# Patient Record
Sex: FEMALE | Race: WHITE | NOT HISPANIC OR LATINO | Employment: FULL TIME | URBAN - METROPOLITAN AREA
[De-identification: names, ages, dates, MRNs, and addresses within clinical notes are randomized per-mention and may not be internally consistent; named-entity substitution may affect disease eponyms.]

---

## 2017-03-06 ENCOUNTER — OFFICE VISIT (OUTPATIENT)
Dept: ENDOCRINOLOGY | Facility: CLINIC | Age: 42
End: 2017-03-06

## 2017-03-06 VITALS
DIASTOLIC BLOOD PRESSURE: 78 MMHG | WEIGHT: 172.9 LBS | HEIGHT: 71 IN | BODY MASS INDEX: 24.21 KG/M2 | HEART RATE: 98 BPM | SYSTOLIC BLOOD PRESSURE: 114 MMHG

## 2017-03-06 DIAGNOSIS — E06.3 HASHIMOTO'S THYROIDITIS: Primary | ICD-10-CM

## 2017-03-06 DIAGNOSIS — E04.1 THYROID NODULE: ICD-10-CM

## 2017-03-06 ASSESSMENT — PAIN SCALES - GENERAL: PAINLEVEL: NO PAIN (0)

## 2017-03-06 NOTE — MR AVS SNAPSHOT
After Visit Summary   3/6/2017    Marjorie Wolf    MRN: [id number]           Patient Information     Date Of Birth          1975        Visit Information        Provider Department      3/6/2017 3:00 PM Brissa Salmeron MD [facility] Endocrinology        Today's Diagnoses     Hashimoto's thyroiditis    -  1    Thyroid nodule           Follow-ups after your visit        Follow-up notes from your care team     Return in about 1 year (around 3/6/2018).      Future tests that were ordered for you today     Open Future Orders        Priority Expected Expires Ordered    Calcium Routine  3/6/2018 3/6/2017    Vitamin D Deficiency (D3 Only) Routine  3/6/2018 3/6/2017    Parathyroid Hormone Intact Routine  3/6/2018 3/6/2017    TSH with free T4 reflex Routine  3/6/2018 3/6/2017            Who to contact     Please call your clinic at [phone] to:    Ask questions about your health    Make or cancel appointments    Discuss your medicines    Learn about your test results    Speak to your doctor   If you have compliments or concerns about an experience at your clinic, or if you wish to file a complaint, please contact [facility] Patient Relations at [phone] or email us at [email or web address]         Additional Information About Your Visit        MyChart Information     [employer] gives you secure access to your electronic health record. If you see a primary care provider, you can also send messages to your care team and make appointments. If you have questions, please call your primary care clinic.  If you do not have a primary care provider, please call [phone] and they will assist you.      [employer] is an electronic gateway that provides easy, online access to your medical records. With [employer], you can request a clinic appointment, read your test results, renew a prescription or communicate with your care team.     To access your existing account,  "please contact your [facility] or call [phone] for assistance.        Care EveryWhere ID     This is your Care EveryWhere ID. This could be used by other organizations to access your [facility] medical records  [id number]        Your Vitals Were     Pulse Height BMI (Body Mass Index)             98 5' 10.5\" (1.791 m) 24.46 kg/m2          Blood Pressure from Last 3 Encounters:   03/06/17 114/78   10/04/16 116/76   09/12/16 122/76    Weight from Last 3 Encounters:   03/06/17 172 lb 14.4 oz (78.4 kg)   10/04/16 176 lb 6.4 oz (80 kg)   09/12/16 179 lb (81.2 kg)               Primary Care Provider Office Phone # Fax #    Jeanette Rodrigues [phone]       [facility] [address] [address]        Thank you!     Thank you for choosing [facility]  for your care. Our goal is always to provide you with excellent care. Hearing back from our patients is one way we can continue to improve our services. Please take a few minutes to complete the written survey that you may receive in the mail after your visit with us. Thank you!             Your Updated Medication List - Protect others around you: Learn how to safely use, store and throw away your medicines at www.disposemymeds.org.          This list is accurate as of: 3/6/17  9:03 PM.  Always use your most recent med list.                   Brand Name Dispense Instructions for use    amitriptyline 25 MG tablet    ELAVIL    120 tablet    TAKE 4 TABLETS BY MOUTH NIGHTLY AS NEEDED FOR SLEEP       doxycycline 100 MG capsule    VIBRAMYCIN    28 capsule    Take 1 capsule (100 mg) by mouth 2 times daily       fluticasone 50 MCG/ACT spray    FLONASE    3 Bottle    SHAKE LIQUID AND USE 1 TO 2 SPRAYS IN EACH NOSTRIL DAILY       LACTAID PO          loratadine 10 MG tablet    CLARITIN    30 tablet    Take 1 tablet (10 mg) by mouth daily       pirbuterol 200 MCG/INH Inhaler    MAXAIR AUTOHALER "    1 Inhaler    Inhale 2 puffs into the lungs. Failed albuterol,PRN in emergencies       SYNTHROID 25 MCG tablet   Generic drug:  levothyroxine     90 tablet    Take 1 tablet (25 mcg) by mouth daily       terbinafine 250 MG tablet    lamISIL    90 tablet    Take 1 tablet (250 mg) by mouth daily       UNKNOWN MED DOSAGE      digestive advantage for lactose intolorance - 1 tab daily

## 2017-03-06 NOTE — LETTER
3/6/2017       RE: Marjorie Wolf  [address] [facility] [address]     Dear Colleague,    Thank you for referring your patient, Marjorie Wolf, to the [facility] ENDOCRINOLOGY at [facility]. Please see a copy of my visit note below.    Endocrinology and Diabetes Clinic  Date of Service: March 6, 2017    Subjective:   Marjorie Wolf is a 42 year old woman here for follow up of Hashimoto's thyroiditis.She saw Dr. Stephen in the past, and this is her first visit with me.     Hashimoto's thyroiditis was diagnosed in 2014, when she presented with goiter and asymptomatic hypothyroidism. She was started on levothyroxine, which she currently takes at a dose of 25 mcg daily. She feels well today, and aside from mild constipation she denies symptoms of hypo or hyperthyroidism.     A thyroid nodule or parathyroid gland was seen on ultrasound in 2014 and has been followed by ultrasound since that time. The nodule was 6x4x8 mm on most recent ultrasound in 11/2016 and is hypoechoic without concerning features. It has not grown since first imaged in 2014.     Past Medical History:  1. Mild asthma.   2. Insomnia.   3. Dyslipidemia.   4. Hashimoto's thyroiditis.     Medications:   Albuterol prn (uses rarely)  Amitriptyline 100 mg prn sleep  Levothyroxine 25 mcg daily  Lactaid  Probiotic   Miralax prn constipation    Allergies:   Allergies   Allergen Reactions     Erythromycin Rash     Penicillins Rash     Sulfa Drugs Rash       Social History:  She is in a stable relationship and lives with her partner in [place]. 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 She does not use tobacco and drinks a few glasses of wine a week.     Social History   Substance Use Topics     Smoking status: Never Smoker     Smokeless tobacco: Never Used     Alcohol use 0.0 oz/week     0 Standard drinks or equivalent per week      Comment: Occas.       Family History:  Hemachromatosis -  "father  Hypothyroidism and type 2 DM - mother  Cardiovascular disease, hypertension, and hyperlipidemia run in the family.   There is no family history of thyroid malignancy.     Family History   Problem Relation Age of Onset     C.A.D. Maternal Grandfather      Artherosclerosis- cause of death, unsure if previous MI     DIABETES Maternal Grandfather      Type 2     Eye Disorder Maternal Grandfather      Glaucoma     C.A.D. Father      MI at age 54     GASTROINTESTINAL DISEASE Father      Ulcer H-Pylori?     Lipids Father      DIABETES Mother      Type 2     Arthritis Mother      Depression Mother      anxiety/panic attacks as well     Obesity Mother      Thyroid Disease Mother      dx ~35yrs     Hypertension Mother      Arthritis Maternal Grandmother      Arthritis Sister      Depression Sister      Neurologic Disorder Sister      Migraines     Lipids Sister      CEREBROVASCULAR DISEASE No family hx of      Breast Cancer No family hx of      Cancer - colorectal No family hx of        Review of Systems:  GENERAL: She has intentionally lost a few pounds by altering her diet.   SKIN: Negative  HENT: Negative   EYE: Negative  HEART: Negative  RESPIRATORY: Negative   GI: Mild constipation this week, for which she is using prn Miralax.   : Negative  MSK: Negative  BLOOD/LYMPH: Negative  NEUROLOGIC: Negative   PSYCH: Negative    Physical Examination:  Blood pressure 114/78, pulse 98, height 5' 10.5\" (1.791 m), weight 172 lb 14.4 oz (78.4 kg), not currently breastfeeding.  Body mass index is 24.46 kg/(m^2).    Wt Readings from Last 4 Encounters:   03/06/17 172 lb 14.4 oz (78.4 kg)   10/04/16 176 lb 6.4 oz (80 kg)   09/12/16 179 lb (81.2 kg)   07/29/16 178 lb 11.2 oz (81.1 kg)     General: Well appearing woman in no distress.   Eyes: EOMI. Sclerae and conjunctivae are clear.    HENT: Mild thyromegaly, no discrete nodules palpable.    Lymphatic: No cervical or supraclavicular lymphadenopathy.  Cardiovascular: RRR, with " normal S1+S2 and no murmurs.   Respiratory: Lungs are clear to auscultation.   Skin: Normal temperature.    Extremities: No peripheral edema.   Neurologic: No tremor with hands outstretched.Normoactive patellar reflex.     Labs and Studies:   ENDO THYROID LABS-[facility] Latest Ref Rng & Units 9/2/2016   TSH 0.40 - 4.00 mU/L 1.91       Assessment:  1. Chronic lymphocytic thyroiditis (Hashimotos thyroiditis).   2. Small posterior thyroid or parathyroid nodule that has been stable on serial imaging.   3. Dyslipidemia.     Plan:   Continue levothyroxine at current dose. Check TSH to confirm euthyroidism. Refill levothyroxine after result returns. She is on a very low dose of levothyroxine and it is possible she does not need this medication - if she would like to trial stopping she will let me know. However, for now she does not mind taking the pill, and her lifetime risk of developing overt hypothyroidism requiring thyroid hormone replacement is high. Check calcium, vitamin D, and PTH with blood draw. Continue to follow the thyroid/parathyroid nodule with serial ultrasound, with next ultrasound in one year. Follow up in clinic at that time.     Brissa Salmeron MD   Diabetes and Endocrinology   [phone]

## 2017-03-06 NOTE — PROGRESS NOTES
Endocrinology and Diabetes Clinic  Date of Service: March 6, 2017    Subjective:   Marjorie Wolf is a 42 year old woman here for follow up of Hashimoto's thyroiditis.She saw Dr. Stephen in the past, and this is her first visit with me.     Hashimoto's thyroiditis was diagnosed in 2014, when she presented with goiter and asymptomatic hypothyroidism. She was started on levothyroxine, which she currently takes at a dose of 25 mcg daily. She feels well today, and aside from mild constipation she denies symptoms of hypo or hyperthyroidism.     A thyroid nodule or parathyroid gland was seen on ultrasound in 2014 and has been followed by ultrasound since that time. The nodule was 6x4x8 mm on most recent ultrasound in 11/2016 and is hypoechoic without concerning features. It has not grown since first imaged in 2014.     Past Medical History:  1. Mild asthma.   2. Insomnia.   3. Dyslipidemia.   4. Hashimoto's thyroiditis.     Medications:   Albuterol prn (uses rarely)  Amitriptyline 100 mg prn sleep  Levothyroxine 25 mcg daily  Lactaid  Probiotic   Miralax prn constipation    Allergies:   Allergies   Allergen Reactions     Erythromycin Rash     Penicillins Rash     Sulfa Drugs Rash       Social History:  She is in a stable relationship and lives with her partner in [place]. 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 She does not use tobacco and drinks a few glasses of wine a week.     Social History   Substance Use Topics     Smoking status: Never Smoker     Smokeless tobacco: Never Used     Alcohol use 0.0 oz/week     0 Standard drinks or equivalent per week      Comment: Occas.       Family History:  Hemachromatosis - father  Hypothyroidism and type 2 DM - mother  Cardiovascular disease, hypertension, and hyperlipidemia run in the family.   There is no family history of thyroid malignancy.     Family History   Problem Relation Age of Onset     C.A.D. Maternal Grandfather      Artherosclerosis- cause of death, unsure  "if previous MI     DIABETES Maternal Grandfather      Type 2     Eye Disorder Maternal Grandfather      Glaucoma     C.A.D. Father      MI at age 54     GASTROINTESTINAL DISEASE Father      Ulcer H-Pylori?     Lipids Father      DIABETES Mother      Type 2     Arthritis Mother      Depression Mother      anxiety/panic attacks as well     Obesity Mother      Thyroid Disease Mother      dx ~35yrs     Hypertension Mother      Arthritis Maternal Grandmother      Arthritis Sister      Depression Sister      Neurologic Disorder Sister      Migraines     Lipids Sister      CEREBROVASCULAR DISEASE No family hx of      Breast Cancer No family hx of      Cancer - colorectal No family hx of        Review of Systems:  GENERAL: She has intentionally lost a few pounds by altering her diet.   SKIN: Negative  HENT: Negative   EYE: Negative  HEART: Negative  RESPIRATORY: Negative   GI: Mild constipation this week, for which she is using prn Miralax.   : Negative  MSK: Negative  BLOOD/LYMPH: Negative  NEUROLOGIC: Negative   PSYCH: Negative    Physical Examination:  Blood pressure 114/78, pulse 98, height 5' 10.5\" (1.791 m), weight 172 lb 14.4 oz (78.4 kg), not currently breastfeeding.  Body mass index is 24.46 kg/(m^2).    Wt Readings from Last 4 Encounters:   03/06/17 172 lb 14.4 oz (78.4 kg)   10/04/16 176 lb 6.4 oz (80 kg)   09/12/16 179 lb (81.2 kg)   07/29/16 178 lb 11.2 oz (81.1 kg)     General: Well appearing woman in no distress.   Eyes: EOMI. Sclerae and conjunctivae are clear.    HENT: Mild thyromegaly, no discrete nodules palpable.    Lymphatic: No cervical or supraclavicular lymphadenopathy.  Cardiovascular: RRR, with normal S1+S2 and no murmurs.   Respiratory: Lungs are clear to auscultation.   Skin: Normal temperature.    Extremities: No peripheral edema.   Neurologic: No tremor with hands outstretched.Normoactive patellar reflex.     Labs and Studies:   ENDO THYROID LABS-[facility] Latest Ref Rng & Units 9/2/2016   TSH 0.40 " - 4.00 mU/L 1.91       Assessment:  1. Chronic lymphocytic thyroiditis (Hashimotos thyroiditis).   2. Small posterior thyroid or parathyroid nodule that has been stable on serial imaging.   3. Dyslipidemia.     Plan:   Continue levothyroxine at current dose. Check TSH to confirm euthyroidism. Refill levothyroxine after result returns. She is on a very low dose of levothyroxine and it is possible she does not need this medication - if she would like to trial stopping she will let me know. However, for now she does not mind taking the pill, and her lifetime risk of developing overt hypothyroidism requiring thyroid hormone replacement is high. Check calcium, vitamin D, and PTH with blood draw. Continue to follow the thyroid/parathyroid nodule with serial ultrasound, with next ultrasound in one year. Follow up in clinic at that time.     Brissa Salmeron MD   Diabetes and Endocrinology   [phone]

## 2017-04-07 DIAGNOSIS — E06.3 HASHIMOTO'S THYROIDITIS: ICD-10-CM

## 2017-04-07 DIAGNOSIS — E04.1 THYROID NODULE: ICD-10-CM

## 2017-04-07 LAB
CALCIUM SERPL-MCNC: 9.6 MG/DL (ref 8.5–10.1)
PTH-INTACT SERPL-MCNC: 41 PG/ML (ref 12–72)
TSH SERPL DL<=0.005 MIU/L-ACNC: 1.24 MU/L (ref 0.4–4)

## 2017-04-07 PROCEDURE — 83970 ASSAY OF PARATHORMONE: CPT | Performed by: FAMILY MEDICINE

## 2017-04-07 PROCEDURE — 84443 ASSAY THYROID STIM HORMONE: CPT | Performed by: FAMILY MEDICINE

## 2017-04-07 PROCEDURE — 82310 ASSAY OF CALCIUM: CPT | Performed by: FAMILY MEDICINE

## 2017-04-07 PROCEDURE — 82306 VITAMIN D 25 HYDROXY: CPT | Performed by: FAMILY MEDICINE

## 2017-04-07 PROCEDURE — 36415 COLL VENOUS BLD VENIPUNCTURE: CPT | Performed by: FAMILY MEDICINE

## 2017-04-09 LAB — DEPRECATED CALCIDIOL+CALCIFEROL SERPL-MC: 7 UG/L (ref 20–75)

## 2017-05-31 DIAGNOSIS — G47.00 INSOMNIA: ICD-10-CM

## 2017-06-01 DIAGNOSIS — G47.00 INSOMNIA: ICD-10-CM

## 2017-06-01 NOTE — TELEPHONE ENCOUNTER
Amitriptyline     Last Written Prescription Date: 6-1-17  Last Fill Quantity: 120, # refills: 0  Last Office Visit with [facility], [facility] or  Health prescribing provider: 10-4-16   Next 5 appointments (look out 90 days)     Jun 07, 2017  9:00 AM CDT   MyChart Physical Adult with Jeanette Rodrigues DO   [facility]    [address]  [facility] [id number] [phone]                   BP Readings from Last 3 Encounters:   03/06/17 114/78   10/04/16 116/76   09/12/16 122/76     Last PHQ-9 score on record=   PHQ-9 SCORE 3/22/2011   Total Score 3

## 2017-06-01 NOTE — TELEPHONE ENCOUNTER
Pharmacy requesting 90 day supply  Denied  90 day supply not appropriate; patient has upcoming appointment  Katarzyna CENTENO RN

## 2017-06-01 NOTE — TELEPHONE ENCOUNTER
Medication is being filled for 1 time refill only due to:  upcoming appt   Katarzyna CENTENO RN    Pending Prescriptions:                       Disp   Refills    amitriptyline (ELAVIL) 25 MG tablet [Pharm*120 ta*0        Sig: TAKE 4 TABLETS BY MOUTH NIGHTLY AS NEEDED FOR SLEEP       Last Written Prescription Date: 10/6/2016  Last Fill Quantity: 120, # refills: 5  Last Office Visit with [facility], [facility] or [facility] prescribing provider: 10/4/2016   Next 5 appointments (look out 90 days)     Jun 07, 2017  9:00 AM CDT   MyChart Physical Adult with Jeanette Rodrigues DO   [facility]    [address]  [facility] [address] [phone]                   BP Readings from Last 3 Encounters:   03/06/17 114/78   10/04/16 116/76   09/12/16 122/76     Last PHQ-9 score on record=   PHQ-9 SCORE 3/22/2011   Total Score 3

## 2017-06-07 ENCOUNTER — OFFICE VISIT (OUTPATIENT)
Dept: FAMILY MEDICINE | Facility: CLINIC | Age: 42
End: 2017-06-07
Payer: COMMERCIAL

## 2017-06-07 ENCOUNTER — TELEPHONE (OUTPATIENT)
Dept: FAMILY MEDICINE | Facility: CLINIC | Age: 42
End: 2017-06-07

## 2017-06-07 VITALS
HEART RATE: 68 BPM | SYSTOLIC BLOOD PRESSURE: 102 MMHG | DIASTOLIC BLOOD PRESSURE: 68 MMHG | TEMPERATURE: 98.5 F | OXYGEN SATURATION: 100 % | BODY MASS INDEX: 23.24 KG/M2 | HEIGHT: 71 IN | WEIGHT: 166 LBS

## 2017-06-07 DIAGNOSIS — G47.00 INSOMNIA, UNSPECIFIED TYPE: ICD-10-CM

## 2017-06-07 DIAGNOSIS — J45.20 INTERMITTENT ASTHMA, UNCOMPLICATED: ICD-10-CM

## 2017-06-07 DIAGNOSIS — J45.20 INTERMITTENT ASTHMA, UNCOMPLICATED: Primary | ICD-10-CM

## 2017-06-07 DIAGNOSIS — E55.9 VITAMIN D DEFICIENCY: ICD-10-CM

## 2017-06-07 DIAGNOSIS — Z00.00 ENCOUNTER FOR ROUTINE ADULT HEALTH EXAMINATION WITHOUT ABNORMAL FINDINGS: Primary | ICD-10-CM

## 2017-06-07 LAB
ALBUMIN SERPL-MCNC: 4 G/DL (ref 3.4–5)
ALP SERPL-CCNC: 74 U/L (ref 40–150)
ALT SERPL W P-5'-P-CCNC: 17 U/L (ref 0–50)
ANION GAP SERPL CALCULATED.3IONS-SCNC: 6 MMOL/L (ref 3–14)
AST SERPL W P-5'-P-CCNC: 15 U/L (ref 0–45)
BILIRUB SERPL-MCNC: 0.5 MG/DL (ref 0.2–1.3)
BUN SERPL-MCNC: 10 MG/DL (ref 7–30)
CALCIUM SERPL-MCNC: 8.8 MG/DL (ref 8.5–10.1)
CHLORIDE SERPL-SCNC: 109 MMOL/L (ref 94–109)
CHOLEST SERPL-MCNC: 180 MG/DL
CO2 SERPL-SCNC: 26 MMOL/L (ref 20–32)
CREAT SERPL-MCNC: 0.67 MG/DL (ref 0.52–1.04)
DEPRECATED CALCIDIOL+CALCIFEROL SERPL-MC: 28 UG/L (ref 20–75)
GFR SERPL CREATININE-BSD FRML MDRD: NORMAL ML/MIN/1.7M2
GLUCOSE SERPL-MCNC: 88 MG/DL (ref 70–99)
HDLC SERPL-MCNC: 53 MG/DL
LDLC SERPL CALC-MCNC: 111 MG/DL
NONHDLC SERPL-MCNC: 127 MG/DL
POTASSIUM SERPL-SCNC: 4.1 MMOL/L (ref 3.4–5.3)
PROT SERPL-MCNC: 7.7 G/DL (ref 6.8–8.8)
SODIUM SERPL-SCNC: 141 MMOL/L (ref 133–144)
TRIGL SERPL-MCNC: 78 MG/DL

## 2017-06-07 PROCEDURE — 80053 COMPREHEN METABOLIC PANEL: CPT | Performed by: FAMILY MEDICINE

## 2017-06-07 PROCEDURE — 99396 PREV VISIT EST AGE 40-64: CPT | Performed by: FAMILY MEDICINE

## 2017-06-07 PROCEDURE — 82306 VITAMIN D 25 HYDROXY: CPT | Performed by: FAMILY MEDICINE

## 2017-06-07 PROCEDURE — 80061 LIPID PANEL: CPT | Performed by: FAMILY MEDICINE

## 2017-06-07 PROCEDURE — 36415 COLL VENOUS BLD VENIPUNCTURE: CPT | Performed by: FAMILY MEDICINE

## 2017-06-07 RX ORDER — LEVALBUTEROL TARTRATE 45 UG/1
AEROSOL, METERED ORAL
Qty: 1 INHALER | Refills: 5 | Status: SHIPPED | OUTPATIENT
Start: 2017-06-07 | End: 2017-06-07

## 2017-06-07 NOTE — PROGRESS NOTES
SUBJECTIVE:     CC: Marjorie Wolf is an 42 year old woman who presents for preventive health visit.     Physical   Annual:     Getting at least 3 servings of Calcium per day::  Yes    Bi-annual eye exam::  Yes    Dental care twice a year::  Yes    Sleep apnea or symptoms of sleep apnea::  None    Diet::  Vegetarian/vegan    Frequency of exercise::  4-5 days/week    Duration of exercise::  45-60 minutes    Taking medications regularly::  Yes    Medication side effects::  Other    Additional concerns today::  YES    -constipation  -arthritis in both hands  -check vit D status    Today's PHQ-2 Score:   PHQ-2 ( 1999 [employer]) 6/4/2017   Q1: Little interest or pleasure in doing things Not at all   Q2: Feeling down, depressed or hopeless Not at all   PHQ-2 Score 0       Abuse: Current or Past(Physical, Sexual or Emotional)- No  Do you feel safe in your environment - Yes    Social History   Substance Use Topics     Smoking status: Never Smoker     Smokeless tobacco: Never Used     Alcohol use 0.0 oz/week     0 Standard drinks or equivalent per week      Comment: Occas.     The patient does not drink >3 drinks per day nor >7 drinks per week.    Recent Labs   Lab Test  06/03/16   0926  05/29/15   0952  05/16/14   1017   CHOL  193  180  195   HDL  48*  60  47*   LDL  124*  101  123   TRIG  105  95  125   CHOLHDLRATIO   --   3.0  4.1   NHDL  145*   --    --        Reviewed orders with patient.  Reviewed health maintenance and updated orders accordingly - Yes    Mammo Decision Support:  Patient under age 50, mutual decision reflected in health maintenance.      Pertinent mammograms are reviewed under the imaging tab.  History of abnormal Pap smear: NO - age 30-65 PAP every 5 years with negative HPV co-testing recommended    Reviewed and updated as needed this visit by clinical staff  Tobacco  Allergies  Meds  Problems         Reviewed and updated as needed this visit by Provider  Allergies  Meds  Problems       "      ROS:  C: NEGATIVE for fever, chills, change in weight  I: NEGATIVE for worrisome rashes, moles or lesions  E: NEGATIVE for vision changes or irritation  ENT: NEGATIVE for ear, mouth and throat problems  R: NEGATIVE for significant cough or SOB  B: NEGATIVE for masses, tenderness or discharge  CV: NEGATIVE for chest pain, palpitations or peripheral edema  GI: NEGATIVE for nausea, abdominal pain, heartburn, or change in bowel habits  : NEGATIVE for unusual urinary or vaginal symptoms. Periods are regular.  M: NEGATIVE for significant arthralgias or myalgia  N: NEGATIVE for weakness, dizziness or paresthesias  P: NEGATIVE for changes in mood or affect    Problem list, Medication list, Allergies, and Medical/Social/Surgical histories reviewed in EPIC and updated as appropriate.  OBJECTIVE:     /68 (BP Location: Left arm, Patient Position: Chair, Cuff Size: Adult Regular)  Pulse 68  Temp 98.5  F (36.9  C) (Oral)  Ht 5' 10.5\" (1.791 m)  Wt 166 lb (75.3 kg)  SpO2 100%  Breastfeeding? No  BMI 23.48 kg/m2  EXAM:  GENERAL: healthy, alert and no distress  EYES: Eyes grossly normal to inspection, PERRL and conjunctivae and sclerae normal  HENT: ear canals and TM's normal, nose and mouth without ulcers or lesions  NECK: no adenopathy, no asymmetry, masses, or scars and thyroid normal to palpation  RESP: lungs clear to auscultation - no rales, rhonchi or wheezes  BREAST: normal without masses, tenderness or nipple discharge and no palpable axillary masses or adenopathy  CV: regular rate and rhythm, normal S1 S2, no S3 or S4, no murmur, click or rub, no peripheral edema and peripheral pulses strong  ABDOMEN: soft, nontender, no hepatosplenomegaly, no masses and bowel sounds normal  MS: no gross musculoskeletal defects noted, no edema  SKIN: no suspicious lesions or rashes  NEURO: Normal strength and tone, mentation intact and speech normal  PSYCH: mentation appears normal, affect " "normal/bright    ASSESSMENT/PLAN:     1. Encounter for routine adult health examination without abnormal findings  Routine screening  - Comprehensive metabolic panel (BMP + Alb, Alk Phos, ALT, AST, Total. Bili, TP)  - Lipid Profile with reflex to direct LDL    2. Vitamin D deficiency  Very def - has been taking 2000 IU daily for at least 6 weeks   Will recheck today to make sure increasing  - Vitamin D Deficiency    3. Insomnia, unspecified type  Refilled med  - amitriptyline (ELAVIL) 25 MG tablet; TAKE 4 TABLETS BY MOUTH NIGHTLY AS NEEDED FOR SLEEP  Dispense: 360 tablet; Refill: 3  - Comprehensive metabolic panel (BMP + Alb, Alk Phos, ALT, AST, Total. Bili, TP)    4. Intermittent asthma, uncomplicated  Refilled for very infrequent use  - pirbuterol (MAXAIR AUTOHALER) 200 MCG/INH Inhaler; Inhale 2 puffs into the lungs. Failed albuterol,PRN in emergencies  Dispense: 1 Inhaler; Refill: 12    COUNSELING:  Reviewed preventive health counseling, as reflected in patient instructions         reports that she has never smoked. She has never used smokeless tobacco.    Estimated body mass index is 23.48 kg/(m^2) as calculated from the following:    Height as of this encounter: 5' 10.5\" (1.791 m).    Weight as of this encounter: 166 lb (75.3 kg).       Counseling Resources:  ATP IV Guidelines  Pooled Cohorts Equation Calculator  Breast Cancer Risk Calculator  FRAX Risk Assessment  ICSI Preventive Guidelines  Dietary Guidelines for Americans, 2010  USDA's MyPlate  ASA Prophylaxis  Lung CA Screening    Jeanette Rodrigues, DO  [facility]  "

## 2017-06-07 NOTE — TELEPHONE ENCOUNTER
Reason for Call:  Medication or medication refill:    Do you use a [facility]?  Name of the pharmacy and phone number for the current request:    [id number] - [place], [place] - [address] AT [facility].    Name of the medication requested: pirbuterol (MAXAIR AUTOHALER) 200 MCG/INH Inhaler  This is not available.  Pharmacist suggested a new script for Xopenex?  Please send a new prescription to [facility]        Can we leave a detailed message on this number? YES    Phone number:[phone]    Best Time: any time    Call taken on 6/7/2017 at 9:43 AM by Lenora Miguel  .

## 2017-06-07 NOTE — MR AVS SNAPSHOT
"              After Visit Summary   6/7/2017    Marjorie Wolf    MRN: [id number]           Patient Information     Date Of Birth          1975        Visit Information        Provider Department      6/7/2017 9:00 AM Jeanette Rodrigues DO [facility]        Today's Diagnoses     Encounter for routine adult health examination without abnormal findings    -  1    Vitamin D deficiency        Insomnia, unspecified type        Intermittent asthma, uncomplicated           Follow-ups after your visit        Who to contact     If you have questions or need follow up information about today's clinic visit or your schedule please contact [facility] directly at [phone].  Normal or non-critical lab and imaging results will be communicated to you by HeyBubblehart, letter or phone within 4 business days after the clinic has received the results. If you do not hear from us within 7 days, please contact the clinic through HeyBubblehart or phone. If you have a critical or abnormal lab result, we will notify you by phone as soon as possible.  Submit refill requests through Moviepilot or call your pharmacy and they will forward the refill request to us. Please allow 3 business days for your refill to be completed.          Additional Information About Your Visit        MyChart Information     Moviepilot gives you secure access to your electronic health record. If you see a primary care provider, you can also send messages to your care team and make appointments. If you have questions, please call your primary care clinic.  If you do not have a primary care provider, please call [phone] and they will assist you.        Care EveryWhere ID     This is your Care EveryWhere ID. This could be used by other organizations to access your [place] medical records  [id number]        Your Vitals Were     Pulse Temperature Height Pulse Oximetry Breastfeeding? BMI (Body Mass Index)    68 98.5  F (36.9  C) (Oral) 5' 10.5\" " (1.791 m) 100% No 23.48 kg/m2       Blood Pressure from Last 3 Encounters:   06/07/17 102/68   03/06/17 114/78   10/04/16 116/76    Weight from Last 3 Encounters:   06/07/17 166 lb (75.3 kg)   03/06/17 172 lb 14.4 oz (78.4 kg)   10/04/16 176 lb 6.4 oz (80 kg)              We Performed the Following     Comprehensive metabolic panel (BMP + Alb, Alk Phos, ALT, AST, Total. Bili, TP)     Lipid Profile with reflex to direct LDL     Vitamin D Deficiency          Today's Medication Changes          These changes are accurate as of: 6/7/17  9:42 AM.  If you have any questions, ask your nurse or doctor.               These medicines have changed or have updated prescriptions.        Dose/Directions    amitriptyline 25 MG tablet   Commonly known as:  ELAVIL   This may have changed:  See the new instructions.   Used for:  Insomnia, unspecified type   Changed by:  Jeanette Rodrigues DO        TAKE 4 TABLETS BY MOUTH NIGHTLY AS NEEDED FOR SLEEP   Quantity:  360 tablet   Refills:  3         Stop taking these medicines if you haven't already. Please contact your care team if you have questions.     doxycycline 100 MG capsule   Commonly known as:  VIBRAMYCIN   Stopped by:  Jeanette Rodrigues DO           fluticasone 50 MCG/ACT spray   Commonly known as:  FLONASE   Stopped by:  Jeanette Rodrigues DO           loratadine 10 MG tablet   Commonly known as:  CLARITIN   Stopped by:  Jeanette Rodrigues DO           terbinafine 250 MG tablet   Commonly known as:  lamISIL   Stopped by:  Jeanette Rodrigues DO                Where to get your medicines      These medications were sent to [id number] - [place] - [address] AT [facility] [address] [facility], [place] [address]     Phone:  [phone]     amitriptyline 25 MG tablet    pirbuterol 200 MCG/INH Inhaler                Primary Care Provider Office Phone # Fax #    Jeanette Rodrigues [phone]       [facility]  [facility] [address] [facility]  [address]  [facility] [address]        Thank you!     Thank you for choosing [facility]  for your care. Our goal is always to provide you with excellent care. Hearing back from our patients is one way we can continue to improve our services. Please take a few minutes to complete the written survey that you may receive in the mail after your visit with us. Thank you!             Your Updated Medication List - Protect others around you: Learn how to safely use, store and throw away your medicines at www.disposemymeds.org.          This list is accurate as of: 6/7/17  9:42 AM.  Always use your most recent med list.                   Brand Name Dispense Instructions for use    amitriptyline 25 MG tablet    ELAVIL    360 tablet    TAKE 4 TABLETS BY MOUTH NIGHTLY AS NEEDED FOR SLEEP       LACTAID PO          pirbuterol 200 MCG/INH Inhaler    MAXAIR AUTOHALER    1 Inhaler    Inhale 2 puffs into the lungs. Failed albuterol,PRN in emergencies       SYNTHROID 25 MCG tablet   Generic drug:  levothyroxine     90 tablet    Take 1 tablet (25 mcg) by mouth daily       UNKNOWN MED DOSAGE      digestive advantage for lactose intolorance - 1 tab daily

## 2017-06-08 RX ORDER — LEVALBUTEROL TARTRATE 45 UG/1
AEROSOL, METERED ORAL
Qty: 75 G | Refills: 1 | Status: SHIPPED | OUTPATIENT
Start: 2017-06-08 | End: 2019-06-26

## 2017-06-08 NOTE — TELEPHONE ENCOUNTER
Levoalbuterol       Last Written Prescription Date: 6/7/2017  Last Fill Quantity: 1, # refills: 5    Last Office Visit with [facility], [facility] or [facility] prescribing provider:  6/7/2017  Future Office Visit:   No future appts scheduled.    Date of Last Asthma Action Plan Letter:   Asthma Action Plan Q1 Year    Topic Date Due     Asthma Action Plan - yearly  06/03/2017      Asthma Control Test:   ACT Total Scores 6/3/2016   ACT TOTAL SCORE -   ASTHMA ER VISITS -   ASTHMA HOSPITALIZATIONS -   ACT TOTAL SCORE (Goal Greater than or Equal to 20) 25   In the past 12 months, how many times did you visit the emergency room for your asthma without being admitted to the hospital? 0   In the past 12 months, how many times were you hospitalized overnight because of your asthma? 0       Date of Last Spirometry Test:   No results found for this or any previous visit.      Clarissa Meade MA  [facility]

## 2017-06-09 NOTE — PROGRESS NOTES
Dear Marjorie,   Your test results are all back -   Vitamin D is improving - keep taking the current dose.  Your liver, kidney and glucose are all normal.  The cholesterol looks better!  Let us know if you have any questions.  -Jeanette Rodrigues, DO

## 2017-06-13 ENCOUNTER — TELEPHONE (OUTPATIENT)
Dept: FAMILY MEDICINE | Facility: CLINIC | Age: 42
End: 2017-06-13

## 2017-06-13 NOTE — TELEPHONE ENCOUNTER
Usually pt's go on xopenex because they do not tolerate ventolin/albuterol.  Do not know off hand for this pt (PCP PN).  Would like assistance from team/triage/MTM to help work through this.  Will forward to Charlotte Cruz as well as we've been discussing PA processing.  Thanks-   CW

## 2017-06-13 NOTE — TELEPHONE ENCOUNTER
Xopenex inhaler not covered by insurance.  Ventolin (preferred alt) covered, $25 copay for qty 2 for 30 days.  Pt has $0 deductible.  [facility] ph# [phone]  ID# [id number]  [facility] ph# [phone]

## 2017-06-15 NOTE — TELEPHONE ENCOUNTER
I'm not sure what else we can do here besides a PA. The covered alternatives are clearly listed by Silvia - if she can't use the covered meds, a PA is required.     Shonna Navas, PharmD, SIVA, BCACP  MTM Pharmacist, [facility]

## 2017-06-15 NOTE — TELEPHONE ENCOUNTER
Thanks Shonna- team please call pt and see if she had se's on albuterol, and if so, start PA with that info.  Thanks- CW

## 2017-06-16 NOTE — TELEPHONE ENCOUNTER
Pt is calling back. And has tried Albuterol  And it made her heart race. And the other was  A pill that you crush and you inhale it and she  Does not remember the name of it. It was so long  Ago and she thinks it may not have been as  Effective.     Home Phone [phone]   Mobile [phone]

## 2017-06-20 NOTE — TELEPHONE ENCOUNTER
LM on VM.  Requesting specific info on which inhaler was tried.  What did it look like?  Was it Ventolin or Proair?

## 2017-06-23 NOTE — TELEPHONE ENCOUNTER
PA approved.  Effective date: 06/22/2017  PA reference #: unknown    Letter sent to abstracting    Melanie Butterfield CMA

## 2017-06-28 DIAGNOSIS — G47.00 INSOMNIA: ICD-10-CM

## 2017-06-28 NOTE — TELEPHONE ENCOUNTER
Denied  Refill request too early; Rx sent 6/7/2017 for 1 year supply  GRADY Bonilla     Last Written Prescription Date: 6/7/2017  Last Fill Quantity: 360, # refills: 3  Last Office Visit with G, P or [facility] prescribing provider:         BP Readings from Last 3 Encounters:   06/07/17 102/68   03/06/17 114/78   10/04/16 116/76     Last PHQ-9 score on record=   PHQ-9 SCORE 3/22/2011   Total Score 3

## 2017-07-07 DIAGNOSIS — E06.3 HYPOTHYROIDISM DUE TO HASHIMOTO'S THYROIDITIS: ICD-10-CM

## 2017-07-10 RX ORDER — LEVOTHYROXINE SODIUM 25 MCG
25 TABLET ORAL DAILY
Qty: 90 TABLET | Refills: 3 | Status: SHIPPED | OUTPATIENT
Start: 2017-07-10 | End: 2018-06-20

## 2017-07-10 NOTE — TELEPHONE ENCOUNTER
SYNTHROID 25 MCG        Last Written Prescription Date:  10/7/16  Last Fill Quantity: 90,   # refills: 2  Last Office Visit : 3/6/17  Future Office visit:  NONE

## 2017-09-07 ENCOUNTER — TELEPHONE (OUTPATIENT)
Dept: ENDOCRINOLOGY | Facility: CLINIC | Age: 42
End: 2017-09-07

## 2017-09-07 DIAGNOSIS — E04.1 THYROID NODULE: Primary | ICD-10-CM

## 2017-10-06 ENCOUNTER — DOCUMENTATION ONLY (OUTPATIENT)
Dept: OTHER | Facility: CLINIC | Age: 42
End: 2017-10-06

## 2017-10-06 PROBLEM — Z71.89 ACP (ADVANCE CARE PLANNING): Chronic | Status: ACTIVE | Noted: 2017-10-06

## 2017-11-09 ENCOUNTER — ALLIED HEALTH/NURSE VISIT (OUTPATIENT)
Dept: NURSING | Facility: CLINIC | Age: 42
End: 2017-11-09
Payer: COMMERCIAL

## 2017-11-09 VITALS — TEMPERATURE: 97.5 F

## 2017-11-09 DIAGNOSIS — Z23 NEED FOR PROPHYLACTIC VACCINATION AND INOCULATION AGAINST INFLUENZA: Primary | ICD-10-CM

## 2017-11-09 PROCEDURE — 90471 IMMUNIZATION ADMIN: CPT

## 2017-11-09 PROCEDURE — 90686 IIV4 VACC NO PRSV 0.5 ML IM: CPT

## 2017-11-09 PROCEDURE — 99207 ZZC NO CHARGE NURSE ONLY: CPT

## 2017-11-09 NOTE — PROGRESS NOTES

## 2017-11-09 NOTE — MR AVS SNAPSHOT
After Visit Summary   11/9/2017    Marjorie Wolf    MRN: [id number]           Patient Information     Date Of Birth          1975        Visit Information        Provider Department      11/9/2017 2:50 PM [facility]U [facility]IC [facility]        Today's Diagnoses     Need for prophylactic vaccination and inoculation against influenza    -  1       Follow-ups after your visit        Your next 10 appointments already scheduled     Nov 09, 2017  2:50 PM CST   Flu Shot only with CP FLU CLINIC   [facility] ([facility])    [address] [facility] [id number] [phone]            Mar 01, 2018  9:45 AM CST   US THYROID with UCUS2   [facility] ([facility])    [address] [place] [id number] [phone]           Please bring a list of your medicines (including vitamins, minerals and over-the-counter drugs). Also, tell your doctor about any allergies you may have. Wear comfortable clothes and leave your valuables at home.  You do not need to do anything special to prepare for your exam.  Please call the Imaging Department at your exam site with any questions.            Mar 05, 2018  2:00 PM CST   (Arrive by 1:45 PM)   RETURN ENDOCRINE with Brissa Salmeron MD   [facility] ([facility])    [address] [place] [address] [phone]              Who to contact     If you have questions or need follow up information about today's clinic visit or your schedule please contact [facility] directly at [phone].  Normal or non-critical lab and imaging results will be communicated to you by MyChart, letter or phone within 4 business days after the clinic has received the results. If you do not hear from us within 7 days, please contact the clinic through  Yodo1hart or phone. If you have a critical or abnormal lab result, we will notify you by phone as soon as possible.  Submit refill requests through [employer] or call your pharmacy and they will forward the refill request to us. Please allow 3 business days for your refill to be completed.          Additional Information About Your Visit        Yodo1hart Information     [employer] gives you secure access to your electronic health record. If you see a primary care provider, you can also send messages to your care team and make appointments. If you have questions, please call your primary care clinic.  If you do not have a primary care provider, please call [phone] and they will assist you.        Care EveryWhere ID     This is your Care EveryWhere ID. This could be used by other organizations to access your [place] medical records  [id number]        Your Vitals Were     Temperature                   97.5  F (36.4  C) (Oral)            Blood Pressure from Last 3 Encounters:   06/07/17 102/68   03/06/17 114/78   10/04/16 116/76    Weight from Last 3 Encounters:   06/07/17 166 lb (75.3 kg)   03/06/17 172 lb 14.4 oz (78.4 kg)   10/04/16 176 lb 6.4 oz (80 kg)              We Performed the Following     FLU VAC, SPLIT VIRUS IM > 3 YO (QUADRIVALENT) [49229]     Vaccine Administration, Initial [46143]        Primary Care Provider Office Phone # Fax #    Jeanette ISABEL DO Ravi [phone] [address] [address]        Equal Access to Services     RADHA CORBIN : Hadii aad ku hadasho Soomaali, waaxda luqadaha, qaybta kaalmada adeegyada, rolo barahona. So [facility] [phone].    ATENCIÓN: Si habla safiaañol, tiene a cerda disposición servicios gratuitos de asistencia lingüística. Llame al [phone].    We comply with applicable federal civil rights laws and [place] laws. We do not discriminate on the basis of race, color, national origin, age, disability, sex, sexual  orientation, or gender identity.            Thank you!     Thank you for choosing [facility]  for your care. Our goal is always to provide you with excellent care. Hearing back from our patients is one way we can continue to improve our services. Please take a few minutes to complete the written survey that you may receive in the mail after your visit with us. Thank you!             Your Updated Medication List - Protect others around you: Learn how to safely use, store and throw away your medicines at www.disposemymeds.org.          This list is accurate as of: 11/9/17  2:49 PM.  Always use your most recent med list.                   Brand Name Dispense Instructions for use Diagnosis    amitriptyline 25 MG tablet    ELAVIL    360 tablet    TAKE 4 TABLETS BY MOUTH NIGHTLY AS NEEDED FOR SLEEP    Insomnia, unspecified type       LACTAID PO           levalbuterol 45 MCG/ACT Inhaler    XOPENEX HFA    75 g    INHALE 2 PUFFS INTO THE LUNGS AS NEEDED IN EMERGENCIES.    Intermittent asthma, uncomplicated       pirbuterol 200 MCG/INH Inhaler    MAXAIR AUTOHALER    1 Inhaler    Inhale 2 puffs into the lungs. Failed albuterol,PRN in emergencies    Intermittent asthma, uncomplicated       SYNTHROID 25 MCG tablet   Generic drug:  levothyroxine     90 tablet    Take 1 tablet (25 mcg) by mouth daily    Hypothyroidism due to Hashimoto's thyroiditis       UNKNOWN MED DOSAGE      digestive advantage for lactose intolorance - 1 tab daily

## 2017-11-09 NOTE — NURSING NOTE
Prior to injection verified patient identity using patient's name and date of birth.  Pascale Rai MA    Per orders of Flu Clinic, injection of Flu given by Pascale Rai CMA. Patient instructed to remain in clinic for 15 minutes afterwards, and to report any adverse reaction to me immediately.  Pascale Rai MA

## 2018-02-28 DIAGNOSIS — E06.3 HYPOTHYROIDISM DUE TO HASHIMOTO'S THYROIDITIS: ICD-10-CM

## 2018-02-28 DIAGNOSIS — E55.9 VITAMIN D DEFICIENCY: ICD-10-CM

## 2018-02-28 LAB
CALCIUM SERPL-MCNC: 9.5 MG/DL (ref 8.5–10.1)
CREAT SERPL-MCNC: 0.73 MG/DL (ref 0.52–1.04)
GFR SERPL CREATININE-BSD FRML MDRD: 87 ML/MIN/1.7M2
PTH-INTACT SERPL-MCNC: 44 PG/ML (ref 18–80)
T4 FREE SERPL-MCNC: 0.73 NG/DL (ref 0.76–1.46)
TSH SERPL DL<=0.005 MIU/L-ACNC: 1.6 MU/L (ref 0.4–4)

## 2018-02-28 PROCEDURE — 83970 ASSAY OF PARATHORMONE: CPT | Performed by: INTERNAL MEDICINE

## 2018-02-28 PROCEDURE — 82565 ASSAY OF CREATININE: CPT | Performed by: INTERNAL MEDICINE

## 2018-02-28 PROCEDURE — 36415 COLL VENOUS BLD VENIPUNCTURE: CPT | Performed by: INTERNAL MEDICINE

## 2018-02-28 PROCEDURE — 82306 VITAMIN D 25 HYDROXY: CPT | Performed by: INTERNAL MEDICINE

## 2018-02-28 PROCEDURE — 84439 ASSAY OF FREE THYROXINE: CPT | Performed by: INTERNAL MEDICINE

## 2018-02-28 PROCEDURE — 84443 ASSAY THYROID STIM HORMONE: CPT | Performed by: INTERNAL MEDICINE

## 2018-02-28 PROCEDURE — 82310 ASSAY OF CALCIUM: CPT | Performed by: INTERNAL MEDICINE

## 2018-03-01 ENCOUNTER — RADIANT APPOINTMENT (OUTPATIENT)
Dept: ULTRASOUND IMAGING | Facility: CLINIC | Age: 43
End: 2018-03-01
Attending: INTERNAL MEDICINE
Payer: COMMERCIAL

## 2018-03-01 DIAGNOSIS — E04.1 THYROID NODULE: ICD-10-CM

## 2018-03-02 LAB
DEPRECATED CALCIDIOL+CALCIFEROL SERPL-MC: <37 UG/L (ref 20–75)
VITAMIN D2 SERPL-MCNC: <5 UG/L
VITAMIN D3 SERPL-MCNC: 32 UG/L

## 2018-03-09 ENCOUNTER — OFFICE VISIT (OUTPATIENT)
Dept: FAMILY MEDICINE | Facility: CLINIC | Age: 43
End: 2018-03-09
Payer: COMMERCIAL

## 2018-03-09 VITALS
HEIGHT: 71 IN | SYSTOLIC BLOOD PRESSURE: 114 MMHG | HEART RATE: 88 BPM | WEIGHT: 174.3 LBS | BODY MASS INDEX: 24.4 KG/M2 | DIASTOLIC BLOOD PRESSURE: 72 MMHG | TEMPERATURE: 98.3 F | OXYGEN SATURATION: 98 %

## 2018-03-09 DIAGNOSIS — R07.0 THROAT PAIN: Primary | ICD-10-CM

## 2018-03-09 DIAGNOSIS — J01.90 ACUTE SINUSITIS WITH SYMPTOMS > 10 DAYS: ICD-10-CM

## 2018-03-09 LAB
DEPRECATED S PYO AG THROAT QL EIA: NORMAL
FLUAV+FLUBV AG SPEC QL: NEGATIVE
FLUAV+FLUBV AG SPEC QL: NEGATIVE
SPECIMEN SOURCE: NORMAL
SPECIMEN SOURCE: NORMAL

## 2018-03-09 PROCEDURE — 87880 STREP A ASSAY W/OPTIC: CPT | Performed by: FAMILY MEDICINE

## 2018-03-09 PROCEDURE — 99213 OFFICE O/P EST LOW 20 MIN: CPT | Performed by: FAMILY MEDICINE

## 2018-03-09 PROCEDURE — 87081 CULTURE SCREEN ONLY: CPT | Performed by: FAMILY MEDICINE

## 2018-03-09 PROCEDURE — 87804 INFLUENZA ASSAY W/OPTIC: CPT | Mod: 59 | Performed by: FAMILY MEDICINE

## 2018-03-09 RX ORDER — FLUTICASONE PROPIONATE 50 MCG
1-2 SPRAY, SUSPENSION (ML) NASAL DAILY
Qty: 1 BOTTLE | Refills: 11 | Status: SHIPPED | OUTPATIENT
Start: 2018-03-09 | End: 2020-02-07

## 2018-03-09 RX ORDER — DOXYCYCLINE HYCLATE 100 MG
100 TABLET ORAL 2 TIMES DAILY
Qty: 28 TABLET | Refills: 0 | Status: SHIPPED | OUTPATIENT
Start: 2018-03-09 | End: 2018-06-20

## 2018-03-09 NOTE — PROGRESS NOTES
Dear Marjorie,   Your test results are all back -   -All of your labs are normal.  Let us know if you have any questions.  -Jeanette Rodrigues, DO

## 2018-03-09 NOTE — PROGRESS NOTES
SUBJECTIVE:   Marjorie Wolf is a 43 year old female who presents to clinic today for the following health issues:      RESPIRATORY SYMPTOMS      Duration: 10 days    Description  sore throat, facial pain/pressure, cough, headache, fatigue/malaise and conjunctival irritation    Severity: moderate    Accompanying signs and symptoms: None    History (predisposing factors):  none    Precipitating or alleviating factors: None    Therapies tried and outcome:  OTC NSAID- only minimal relief.      -------------------------------------    Problem list and histories reviewed & adjusted, as indicated.  Additional history: as documented    Patient Active Problem List   Diagnosis     Allergic rhinitis due to pollen     Family history of other cardiovascular diseases     Low back pain     Hypertriglyceridemia     CARDIOVASCULAR SCREENING; LDL GOAL LESS THAN 160     Intermittent asthma     Insomnia     SVT (supraventricular tachycardia) (H)     Family history of hemochromatosis     Hashimoto's thyroiditis     Thyroid enlargement     Thyroid nodule     ACP (advance care planning)     Past Surgical History:   Procedure Laterality Date     C APPENDECTOMY  4/1989     C NONSPECIFIC PROCEDURE  6/1990    Nasal surgery for deviated septum     C NONSPECIFIC PROCEDURE  11/1990    Nasal Surgery for deviated septum     SURGICAL HISTORY OF -   2000    wisdom teeth extraction       Social History   Substance Use Topics     Smoking status: Never Smoker     Smokeless tobacco: Never Used     Alcohol use 0.0 oz/week     0 Standard drinks or equivalent per week      Comment: Occas.     Family History   Problem Relation Age of Onset     DIABETES Mother      Type 2     Arthritis Mother      Depression Mother      anxiety/panic attacks as well     Obesity Mother      Thyroid Disease Mother      dx ~35yrs     Hypertension Mother      C.A.D. Father      MI at age 54     GASTROINTESTINAL DISEASE Father      Ulcer H-Pylori?     Lipids Father       "Arthritis Maternal Grandmother      C.A.D. Maternal Grandfather      Artherosclerosis- cause of death, unsure if previous MI     DIABETES Maternal Grandfather      Type 2     Eye Disorder Maternal Grandfather      Glaucoma     Arthritis Sister      Depression Sister      Neurologic Disorder Sister      Migraines     Lipids Sister      CEREBROVASCULAR DISEASE No family hx of      Breast Cancer No family hx of      Cancer - colorectal No family hx of            Reviewed and updated as needed this visit by clinical staff  Tobacco  Allergies  Meds       Reviewed and updated as needed this visit by Provider         ROS:  Constitutional, HEENT, cardiovascular, pulmonary, gi and gu systems are negative, except as otherwise noted.    OBJECTIVE:     /72  Pulse 88  Temp 98.3  F (36.8  C) (Tympanic)  Ht 5' 10.5\" (1.791 m)  Wt 174 lb 4.8 oz (79.1 kg)  SpO2 98%  Breastfeeding? No  BMI 24.66 kg/m2  Body mass index is 24.66 kg/(m^2).  GENERAL: alert and no distress  EYES: Eyes grossly normal to inspection, PERRL and conjunctivae and sclerae normal  HENT: normal cephalic/atraumatic, ear canals and TM's normal, nose and mouth without ulcers or lesions, oropharynx clear, oral mucous membranes moist and sinuses: maxillary tenderness on bilateral  NECK: no adenopathy, no asymmetry, masses, or scars and thyroid normal to palpation  RESP: lungs clear to auscultation - no rales, rhonchi or wheezes and initially rale cleared with cough  BREAST: normal without masses, tenderness or nipple discharge and no palpable axillary masses or adenopathy  CV: regular rate and rhythm, normal S1 S2, no S3 or S4, no murmur, click or rub, no peripheral edema and peripheral pulses strong  MS: no gross musculoskeletal defects noted, no edema  SKIN: no suspicious lesions or rashes  NEURO: Normal strength and tone, mentation intact and speech normal  PSYCH: mentation appears normal, affect normal/bright    Diagnostic Test Results:  Results for " orders placed or performed in visit on 03/09/18 (from the past 24 hour(s))   Strep, Rapid Screen   Result Value Ref Range    Specimen Description Throat     Rapid Strep A Screen       NEGATIVE: No Group A streptococcal antigen detected by immunoassay, await culture report.       ASSESSMENT/PLAN:     1. Upper respiratory infection     - Influenza A/B antigen    2. Throat pain     - Strep, Rapid Screen  - Beta strep group A culture    3. Acute sinusitis with symptoms > 10 days  Will treat with doxy BID for 10 days  Pt will call or RTC if symptoms worsen or do not improve.   - doxycycline (VIBRA-TABS) 100 MG tablet; Take 1 tablet (100 mg) by mouth 2 times daily  Dispense: 28 tablet; Refill: 0  - fluticasone (FLONASE) 50 MCG/ACT spray; Spray 1-2 sprays into both nostrils daily  Dispense: 1 Bottle; Refill: 11        Jeanette Rodrigues DO  [facility]

## 2018-03-09 NOTE — MR AVS SNAPSHOT
After Visit Summary   3/9/2018    Marjorie Wolf    MRN: [id number]           Patient Information     Date Of Birth          1975        Visit Information        Provider Department      3/9/2018 9:15 AM Jeanette Rodrigues DO [facility]        Today's Diagnoses     Throat pain    -  1    Acute sinusitis with symptoms > 10 days           Follow-ups after your visit        Your next 10 appointments already scheduled     Aug 13, 2018  2:00 PM CDT   (Arrive by 1:45 PM)   RETURN ENDOCRINE with Brissa Salmeron MD   [facility] Endocrinology ([facility] [place])    [address] [place] [address] [phone]              Who to contact     If you have questions or need follow up information about today's clinic visit or your schedule please contact [facility] directly at [phone].  Normal or non-critical lab and imaging results will be communicated to you by MyChart, letter or phone within 4 business days after the clinic has received the results. If you do not hear from us within 7 days, please contact the clinic through MyChart or phone. If you have a critical or abnormal lab result, we will notify you by phone as soon as possible.  Submit refill requests through [employer] or call your pharmacy and they will forward the refill request to us. Please allow 3 business days for your refill to be completed.          Additional Information About Your Visit        MyChart Information     [employer] gives you secure access to your electronic health record. If you see a primary care provider, you can also send messages to your care team and make appointments. If you have questions, please call your primary care clinic.  If you do not have a primary care provider, please call [phone] and they will assist you.        Care EveryWhere ID     This is your Care EveryWhere ID. This could be used by other organizations to access your [place]  "medical records  [id number]        Your Vitals Were     Pulse Temperature Height Pulse Oximetry Breastfeeding? BMI (Body Mass Index)    88 98.3  F (36.8  C) (Tympanic) 5' 10.5\" (1.791 m) 98% No 24.66 kg/m2       Blood Pressure from Last 3 Encounters:   03/09/18 114/72   06/07/17 102/68   03/06/17 114/78    Weight from Last 3 Encounters:   03/09/18 174 lb 4.8 oz (79.1 kg)   06/07/17 166 lb (75.3 kg)   03/06/17 172 lb 14.4 oz (78.4 kg)              We Performed the Following     Beta strep group A culture     Influenza A/B antigen     Strep, Rapid Screen          Today's Medication Changes          These changes are accurate as of 3/9/18  9:43 AM.  If you have any questions, ask your nurse or doctor.               Start taking these medicines.        Dose/Directions    doxycycline 100 MG tablet   Commonly known as:  VIBRA-TABS   Used for:  Acute sinusitis with symptoms > 10 days   Started by:  Jeanette Rodrigues DO        Dose:  100 mg   Take 1 tablet (100 mg) by mouth 2 times daily   Quantity:  28 tablet   Refills:  0       fluticasone 50 MCG/ACT spray   Commonly known as:  FLONASE   Used for:  Acute sinusitis with symptoms > 10 days   Started by:  Jeanette Rodrigues DO        Dose:  1-2 spray   Spray 1-2 sprays into both nostrils daily   Quantity:  1 Bottle   Refills:  11            Where to get your medicines      [id number] - [place], [place] - [address] AT [facility] [address]  [address], [place] [address]     Phone:  [phone]     doxycycline 100 MG tablet    fluticasone 50 MCG/ACT spray                Primary Care Provider Office Phone # Fax #    Jeanette Rodrigues -[phone] [address] [address]        Equal Access to Services     JAY CORBIN AH: Jered Kimball, yudelka blackwell, rolo reedersh la'aan ah. So [facility] " [phone].    ATENCIÓN: Si jailyn mejia, tiene a cerda disposición servicios gratuitos de asistencia lingüística. Gio tavares [phone].    We comply with applicable federal civil rights laws and [place] laws. We do not discriminate on the basis of race, color, national origin, age, disability, sex, sexual orientation, or gender identity.            Thank you!     Thank you for choosing [facility]  for your care. Our goal is always to provide you with excellent care. Hearing back from our patients is one way we can continue to improve our services. Please take a few minutes to complete the written survey that you may receive in the mail after your visit with us. Thank you!             Your Updated Medication List - Protect others around you: Learn how to safely use, store and throw away your medicines at www.disposemymeds.org.          This list is accurate as of 3/9/18  9:43 AM.  Always use your most recent med list.                   Brand Name Dispense Instructions for use Diagnosis    amitriptyline 25 MG tablet    ELAVIL    360 tablet    TAKE 4 TABLETS BY MOUTH NIGHTLY AS NEEDED FOR SLEEP    Insomnia, unspecified type       doxycycline 100 MG tablet    VIBRA-TABS    28 tablet    Take 1 tablet (100 mg) by mouth 2 times daily    Acute sinusitis with symptoms > 10 days       fluticasone 50 MCG/ACT spray    FLONASE    1 Bottle    Spray 1-2 sprays into both nostrils daily    Acute sinusitis with symptoms > 10 days       LACTAID PO           levalbuterol 45 MCG/ACT Inhaler    XOPENEX HFA    75 g    INHALE 2 PUFFS INTO THE LUNGS AS NEEDED IN EMERGENCIES.    Intermittent asthma, uncomplicated       pirbuterol 200 MCG/INH Inhaler    MAXAIR AUTOHALER    1 Inhaler    Inhale 2 puffs into the lungs. Failed albuterol,PRN in emergencies    Intermittent asthma, uncomplicated       SYNTHROID 25 MCG tablet   Generic drug:  levothyroxine     90 tablet    Take 1 tablet (25 mcg) by mouth daily    Hypothyroidism due to  Hashimoto's thyroiditis       UNKNOWN MED DOSAGE      digestive advantage for lactose intolorance - 1 tab daily

## 2018-03-10 LAB
BACTERIA SPEC CULT: NORMAL
SPECIMEN SOURCE: NORMAL

## 2018-04-04 ENCOUNTER — MYC MEDICAL ADVICE (OUTPATIENT)
Dept: FAMILY MEDICINE | Facility: CLINIC | Age: 43
End: 2018-04-04

## 2018-06-04 ENCOUNTER — OFFICE VISIT (OUTPATIENT)
Dept: ENDOCRINOLOGY | Facility: CLINIC | Age: 43
End: 2018-06-04
Payer: COMMERCIAL

## 2018-06-04 VITALS
DIASTOLIC BLOOD PRESSURE: 74 MMHG | HEART RATE: 89 BPM | BODY MASS INDEX: 24.19 KG/M2 | SYSTOLIC BLOOD PRESSURE: 111 MMHG | WEIGHT: 172.8 LBS | HEIGHT: 71 IN

## 2018-06-04 DIAGNOSIS — E06.3 HASHIMOTO'S THYROIDITIS: Primary | ICD-10-CM

## 2018-06-04 DIAGNOSIS — E04.1 THYROID NODULE: ICD-10-CM

## 2018-06-04 NOTE — PROGRESS NOTES
Endocrinology and Diabetes Clinic Follow-up   Date of Service: June 4, 2018    Subjective:  Marjorie Wolf is a 42 year old woman here for follow up of Hashimoto's thyroiditis. Hashimoto's thyroiditis was diagnosed in 2014, when she presented with goiter and asymptomatic hypothyroidism.      She was taking 25 mcg of levothyroxine daily until January, when her insurance stopped covering Synthroid.  At that time, she stopped levothyroxine as she had always been asymptomatic and was on a very low dose. She feels well today and aside from mild, chronic constipation denies any symptoms of hypo or hyperthyroidism.  She denies any change in symptoms after stopping the medication.    A thyroid nodule or parathyroid gland was seen on US in 2014 and has been followed by ultrasound since that time. On most recent US on 3/1/18 there is a right sided posterior 0.5x 0.4x 0.6 cm nodule which is hypoechoic without concerning features, which was stable in size compared with previous imaging. On various reports there is reference to a right posterior nodule which may be an exophytic nodule or parathyroid gland; I believe these reports are all referring to the same lesion. Thyroid gland has diffusely heterogenous echotexture, consistent with Hashimoto's thyroiditis.     Past Medical History:  1) Mild asthma  2) Insomnia  3) Dyslipidemia  4) Hashimoto's thyroiditis    Medications:  Albuterol prn (uses rarely)  Amitriptyline 100mg prn sleep  Lactaid  Probiotic  Miralax daily    Allergies:  Allergies   Allergen Reactions     Erythromycin Rash     Penicillins Rash     Sulfa Drugs Rash     Social History:  She remains in a stable relationship and lives with her partner in [place].  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 She has never used tobacco and drinks a few glasses of wine a week.    Family History:  Father: hemachromatosis  Mother: hypothyroidism, type 2 DM  CV disease, HTN and  "hyperlipidemia run in family  No family history of thyroid malignancy.    Family History   Problem Relation Age of Onset     DIABETES Mother      Type 2     Arthritis Mother      Depression Mother      anxiety/panic attacks as well     Obesity Mother      Thyroid Disease Mother      dx ~35yrs     Hypertension Mother      C.A.D. Father      MI at age 54     GASTROINTESTINAL DISEASE Father      Ulcer H-Pylori?     Lipids Father      Arthritis Maternal Grandmother      C.A.D. Maternal Grandfather      Artherosclerosis- cause of death, unsure if previous MI     DIABETES Maternal Grandfather      Type 2     Eye Disorder Maternal Grandfather      Glaucoma     Arthritis Sister      Depression Sister      Neurologic Disorder Sister      Migraines     Lipids Sister      CEREBROVASCULAR DISEASE No family hx of      Breast Cancer No family hx of      Cancer - colorectal No family hx of      Review of Systems:  General: no weight changes  Skin: negative  HENT: negative  Eye: some dry eyes treated with lubricating eye drops  Heart: negative  Respiratory: negative  GI: mild constipation treated with miralax  : negative  MSK: negative  Neurologic: negative  Psych: negative    Physical Exam:  /74 (BP Location: Right arm)  Pulse 89  Ht 1.791 m (5' 10.5\")  Wt 78.4 kg (172 lb 12.8 oz)  BMI 24.44 kg/m2      Wt Readings from Last 4 Encounters:   06/04/18 78.4 kg (172 lb 12.8 oz)   03/09/18 79.1 kg (174 lb 4.8 oz)   06/07/17 75.3 kg (166 lb)   03/06/17 78.4 kg (172 lb 14.4 oz)     General: well appearing, no acute distress  Eyes: extraocular movements intact, sclera and conjunctiva are clear  Neck: Mild thyromegaly on palpation, no discrete nodules palpable  Lymphatic: no cervical or supraclavicular lymphadenopathy  CV: RRR, normal S1+S2, no murmurs  Respiratory: lungs clear to auscultation bilaterally  Skin: normal temperature and turgor  Extremities: no peripheral edema  Neurologic: No tremor with hands outstretched, " patellar reflexes normoactive    Labs:  2/28/18  TSH: 1.6  T4: 0.73  PTH: 44  Cr: 0.73  Ca: 9.5  25 OH vitamin D3: 32    Assessment:  1. Chronic lymphocytic thyroiditis (Hashimoto's thyroiditis). It appears that she still has adequate thyroid function and does not yet require levothyroxine.  2. Small posterior thyroid or parathyroid nodule that has been stable on serial imaging.  3. History of low vitamin D level (7). The accuracy of this result is questionalble, as concurrent PTH was normal and the low vitamin D level corrected within 2 months without a high dose replacement (she reports taking only 1000 units daily). Recent level was 32, and so we recommended that she continue taking a maintenance dose of vitamin D 1000 units daily, particularly in the winter.     Plan:  Will recheck TSH with lab draw planned at her annual physical later this month. At that point she will have been off of levothyroxine treatment for 5 months. If normal, she may continue to follow TSH annually with her PCP, or sooner if she develops symptoms of hypothyroidism.  Given stability over several years of imaging, annual thyroid ultrasounds are no longer needed. It would be reasonable to repeat an ultrasound in another 3-5 years, sooner if symptoms.         Marjorie would like to follow TSH with her PCP and will follow up in endocrine clinic on a prn basis. She will contact us if she has any questions or concerns.      This note was scribed by Linda Parks, MS4. Patient was seen and examined with attending physician, Dr. Salmeron.      Physician Attestation   I, Brissa Salmeron, was present with the medical student who participated in the service and in the documentation of the note.  I have verified the history and personally performed the physical exam and medical decision making.  I agree with the assessment and plan of care as documented in the note, which I have reviewed and edited.     Brissa Salmeron MD  Endocrinology and Diabetes    [phone]

## 2018-06-04 NOTE — LETTER
6/4/2018       RE: Marjorie Wolf  [address] [facility] [address]     Dear Colleague,    Thank you for referring your patient, Marjorie Wolf, to the [facility] ENDOCRINOLOGY at [facility]. Please see a copy of my visit note below.    Endocrinology and Diabetes Clinic Follow-up   Date of Service: June 4, 2018    Subjective:  Marjorie Wolf is a 42 year old woman here for follow up of Hashimoto's thyroiditis. Hashimoto's thyroiditis was diagnosed in 2014, when she presented with goiter and asymptomatic hypothyroidism.      She was taking 25 mcg of levothyroxine daily until January, when her insurance stopped covering Synthroid.  At that time, she stopped levothyroxine as she had always been asymptomatic and was on a very low dose. She feels well today and aside from mild, chronic constipation denies any symptoms of hypo or hyperthyroidism.  She denies any change in symptoms after stopping the medication.    A thyroid nodule or parathyroid gland was seen on US in 2014 and has been followed by ultrasound since that time. On most recent US on 3/1/18 there is a right sided posterior 0.5x 0.4x 0.6 cm nodule which is hypoechoic without concerning features, which was stable in size compared with previous imaging. On various reports there is reference to a right posterior nodule which may be an exophytic nodule or parathyroid gland; I believe these reports are all referring to the same lesion. Thyroid gland has diffusely heterogenous echotexture, consistent with Hashimoto's thyroiditis.     Past Medical History:  1) Mild asthma  2) Insomnia  3) Dyslipidemia  4) Hashimoto's thyroiditis    Medications:  Albuterol prn (uses rarely)  Amitriptyline 100mg prn sleep  Lactaid  Probiotic  Miralax daily    Allergies:  Allergies   Allergen Reactions     Erythromycin Rash     Penicillins Rash     Sulfa Drugs Rash     Social History:  She remains in a stable relationship and lives with  "her partner in [place].  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 She has never used tobacco and drinks a few glasses of wine a week.    Family History:  Father: hemachromatosis  Mother: hypothyroidism, type 2 DM  CV disease, HTN and hyperlipidemia run in family  No family history of thyroid malignancy.    Family History   Problem Relation Age of Onset     DIABETES Mother      Type 2     Arthritis Mother      Depression Mother      anxiety/panic attacks as well     Obesity Mother      Thyroid Disease Mother      dx ~35yrs     Hypertension Mother      C.A.D. Father      MI at age 54     GASTROINTESTINAL DISEASE Father      Ulcer H-Pylori?     Lipids Father      Arthritis Maternal Grandmother      C.A.D. Maternal Grandfather      Artherosclerosis- cause of death, unsure if previous MI     DIABETES Maternal Grandfather      Type 2     Eye Disorder Maternal Grandfather      Glaucoma     Arthritis Sister      Depression Sister      Neurologic Disorder Sister      Migraines     Lipids Sister      CEREBROVASCULAR DISEASE No family hx of      Breast Cancer No family hx of      Cancer - colorectal No family hx of      Review of Systems:  General: no weight changes  Skin: negative  HENT: negative  Eye: some dry eyes treated with lubricating eye drops  Heart: negative  Respiratory: negative  GI: mild constipation treated with miralax  : negative  MSK: negative  Neurologic: negative  Psych: negative    Physical Exam:  /74 (BP Location: Right arm)  Pulse 89  Ht 1.791 m (5' 10.5\")  Wt 78.4 kg (172 lb 12.8 oz)  BMI 24.44 kg/m2      Wt Readings from Last 4 Encounters:   06/04/18 78.4 kg (172 lb 12.8 oz)   03/09/18 79.1 kg (174 lb 4.8 oz)   06/07/17 75.3 kg (166 lb)   03/06/17 78.4 kg (172 lb 14.4 oz)     General: well appearing, no acute distress  Eyes: extraocular movements intact, sclera and conjunctiva are clear  Neck: Mild thyromegaly on palpation, no discrete nodules " palpable  Lymphatic: no cervical or supraclavicular lymphadenopathy  CV: RRR, normal S1+S2, no murmurs  Respiratory: lungs clear to auscultation bilaterally  Skin: normal temperature and turgor  Extremities: no peripheral edema  Neurologic: No tremor with hands outstretched, patellar reflexes normoactive    Labs:  2/28/18  TSH: 1.6  T4: 0.73  PTH: 44  Cr: 0.73  Ca: 9.5  25 OH vitamin D3: 32    Assessment:  1. Chronic lymphocytic thyroiditis (Hashimoto's thyroiditis). It appears that she still has adequate thyroid function and does not yet require levothyroxine.  2. Small posterior thyroid or parathyroid nodule that has been stable on serial imaging.  3. History of low vitamin D level (7). The accuracy of this result is questionalble, as concurrent PTH was normal and the low vitamin D level corrected within 2 months without a high dose replacement (she reports taking only 1000 units daily). Recent level was 32, and so we recommended that she continue taking a maintenance dose of vitamin D 1000 units daily, particularly in the winter.     Plan:  Will recheck TSH with lab draw planned at her annual physical later this month. At that point she will have been off of levothyroxine treatment for 5 months. If normal, she may continue to follow TSH annually with her PCP, or sooner if she develops symptoms of hypothyroidism.  Given stability over several years of imaging, annual thyroid ultrasounds are no longer needed. It would be reasonable to repeat an ultrasound in another 3-5 years, sooner if symptoms.         Marjorie would like to follow TSH with her PCP and will follow up in endocrine clinic on a prn basis. She will contact us if she has any questions or concerns.      This note was scribed by Linda Parks, MS4. Patient was seen and examined with attending physician, Dr. Salmeron.      Physician Attestation   I, Brissa Salmeron, was present with the medical student who participated in the service and in the  documentation of the note.  I have verified the history and personally performed the physical exam and medical decision making.  I agree with the assessment and plan of care as documented in the note, which I have reviewed and edited.     Brissa Salmeron MD  Endocrinology and Diabetes   [phone]

## 2018-06-04 NOTE — MR AVS SNAPSHOT
After Visit Summary   6/4/2018    Marjorie Wolf    MRN: [id number]           Patient Information     Date Of Birth          1975        Visit Information        Provider Department      6/4/2018 1:30 PM Brissa Salmeron MD [facility] Endocrinology        Today's Diagnoses     Hashimoto's thyroiditis    -  1    Thyroid nodule           Follow-ups after your visit        Follow-up notes from your care team     Return if symptoms worsen or fail to improve.      Your next 10 appointments already scheduled     Jun 20, 2018  8:30 AM CDT   MyChart Physical Adult with Jeanette Rodrigues DO   [facility] ([facility])    [address] [id number] [phone]            Aug 13, 2018  2:00 PM CDT   (Arrive by 1:45 PM)   RETURN ENDOCRINE with Brissa Salmeron MD   [facility] Endocrinology ([facility] [place])    [address] [address] [phone]              Who to contact     Please call your clinic at [phone] to:    Ask questions about your health    Make or cancel appointments    Discuss your medicines    Learn about your test results    Speak to your doctor            Additional Information About Your Visit        MyChart Information     [employer] gives you secure access to your electronic health record. If you see a primary care provider, you can also send messages to your care team and make appointments. If you have questions, please call your primary care clinic.  If you do not have a primary care provider, please call [phone] and they will assist you.      [employer] is an electronic gateway that provides easy, online access to your medical records. With [employer], you can request a clinic appointment, read your test results, renew a prescription or communicate with your care team.     To access your existing account, please contact your [facility] or  "call [phone] for assistance.        Care EveryWhere ID     This is your Care EveryWhere ID. This could be used by other organizations to access your [place] medical records  [id number]        Your Vitals Were     Pulse Height BMI (Body Mass Index)             89 5' 10.5\" (1.791 m) 24.44 kg/m2          Blood Pressure from Last 3 Encounters:   06/04/18 111/74   03/09/18 114/72   06/07/17 102/68    Weight from Last 3 Encounters:   06/04/18 172 lb 12.8 oz (78.4 kg)   03/09/18 174 lb 4.8 oz (79.1 kg)   06/07/17 166 lb (75.3 kg)               Primary Care Provider Office Phone # Fax #    Jeanette ISABEL Ravi  [phone] [address] [address]        Equal Access to Services     RADHA CORBIN : Hadii aad ku hadasho Soomaali, waaxda luqadaha, qaybta kaalmada adeegyada, waxay idiin hayaan juan alberto khmarielena alexander . So [facility] [phone].    ATENCIÓN: Si habla español, tiene a cerda disposición servicios gratuitos de asistencia lingüística. Gio al [phone].    We comply with applicable federal civil rights laws and [place] laws. We do not discriminate on the basis of race, color, national origin, age, disability, sex, sexual orientation, or gender identity.            Thank you!     Thank you for choosing [facility] ENDOCRINOLOGY  for your care. Our goal is always to provide you with excellent care. Hearing back from our patients is one way we can continue to improve our services. Please take a few minutes to complete the written survey that you may receive in the mail after your visit with us. Thank you!             Your Updated Medication List - Protect others around you: Learn how to safely use, store and throw away your medicines at www.disposemymeds.org.          This list is accurate as of 6/4/18 11:59 PM.  Always use your most recent med list.                   Brand Name Dispense Instructions for use Diagnosis    amitriptyline 25 MG tablet    ELAVIL    360 tablet    TAKE " 4 TABLETS BY MOUTH NIGHTLY AS NEEDED FOR SLEEP    Insomnia, unspecified type       doxycycline 100 MG tablet    VIBRA-TABS    28 tablet    Take 1 tablet (100 mg) by mouth 2 times daily    Acute sinusitis with symptoms > 10 days       fluticasone 50 MCG/ACT spray    FLONASE    1 Bottle    Spray 1-2 sprays into both nostrils daily    Acute sinusitis with symptoms > 10 days       LACTAID PO           levalbuterol 45 MCG/ACT Inhaler    XOPENEX HFA    75 g    INHALE 2 PUFFS INTO THE LUNGS AS NEEDED IN EMERGENCIES.    Intermittent asthma, uncomplicated       pirbuterol 200 MCG/INH Inhaler    MAXAIR AUTOHALER    1 Inhaler    Inhale 2 puffs into the lungs. Failed albuterol,PRN in emergencies    Intermittent asthma, uncomplicated       SYNTHROID 25 MCG tablet   Generic drug:  levothyroxine     90 tablet    Take 1 tablet (25 mcg) by mouth daily    Hypothyroidism due to Hashimoto's thyroiditis       UNKNOWN MED DOSAGE      digestive advantage for lactose intolorance - 1 tab daily

## 2018-06-20 ENCOUNTER — OFFICE VISIT (OUTPATIENT)
Dept: FAMILY MEDICINE | Facility: CLINIC | Age: 43
End: 2018-06-20
Payer: COMMERCIAL

## 2018-06-20 VITALS
TEMPERATURE: 97.2 F | HEIGHT: 71 IN | DIASTOLIC BLOOD PRESSURE: 72 MMHG | RESPIRATION RATE: 16 BRPM | OXYGEN SATURATION: 98 % | WEIGHT: 167 LBS | SYSTOLIC BLOOD PRESSURE: 110 MMHG | HEART RATE: 83 BPM | BODY MASS INDEX: 23.38 KG/M2

## 2018-06-20 DIAGNOSIS — M79.662 PAIN OF LEFT LOWER LEG: ICD-10-CM

## 2018-06-20 DIAGNOSIS — I47.10 SVT (SUPRAVENTRICULAR TACHYCARDIA) (H): ICD-10-CM

## 2018-06-20 DIAGNOSIS — Z00.00 ENCOUNTER FOR ROUTINE ADULT HEALTH EXAMINATION WITHOUT ABNORMAL FINDINGS: Primary | ICD-10-CM

## 2018-06-20 DIAGNOSIS — E78.1 HYPERTRIGLYCERIDEMIA: ICD-10-CM

## 2018-06-20 DIAGNOSIS — G47.00 INSOMNIA, UNSPECIFIED TYPE: ICD-10-CM

## 2018-06-20 DIAGNOSIS — E06.3 HASHIMOTO'S THYROIDITIS: ICD-10-CM

## 2018-06-20 LAB
ALBUMIN SERPL-MCNC: 4 G/DL (ref 3.4–5)
ALP SERPL-CCNC: 71 U/L (ref 40–150)
ALT SERPL W P-5'-P-CCNC: 21 U/L (ref 0–50)
ANION GAP SERPL CALCULATED.3IONS-SCNC: 10 MMOL/L (ref 3–14)
AST SERPL W P-5'-P-CCNC: 17 U/L (ref 0–45)
BILIRUB SERPL-MCNC: 0.7 MG/DL (ref 0.2–1.3)
BUN SERPL-MCNC: 9 MG/DL (ref 7–30)
CALCIUM SERPL-MCNC: 9.2 MG/DL (ref 8.5–10.1)
CHLORIDE SERPL-SCNC: 106 MMOL/L (ref 94–109)
CHOLEST SERPL-MCNC: 198 MG/DL
CO2 SERPL-SCNC: 24 MMOL/L (ref 20–32)
CREAT SERPL-MCNC: 0.71 MG/DL (ref 0.52–1.04)
GFR SERPL CREATININE-BSD FRML MDRD: 90 ML/MIN/1.7M2
GLUCOSE SERPL-MCNC: 90 MG/DL (ref 70–99)
HDLC SERPL-MCNC: 58 MG/DL
HIV 1+2 AB+HIV1 P24 AG SERPL QL IA: NONREACTIVE
LDLC SERPL CALC-MCNC: 110 MG/DL
NONHDLC SERPL-MCNC: 140 MG/DL
POTASSIUM SERPL-SCNC: 3.8 MMOL/L (ref 3.4–5.3)
PROT SERPL-MCNC: 7.7 G/DL (ref 6.8–8.8)
SODIUM SERPL-SCNC: 140 MMOL/L (ref 133–144)
TRIGL SERPL-MCNC: 152 MG/DL
TSH SERPL DL<=0.005 MIU/L-ACNC: 2.02 MU/L (ref 0.4–4)

## 2018-06-20 PROCEDURE — 87624 HPV HI-RISK TYP POOLED RSLT: CPT | Performed by: FAMILY MEDICINE

## 2018-06-20 PROCEDURE — 36415 COLL VENOUS BLD VENIPUNCTURE: CPT | Performed by: FAMILY MEDICINE

## 2018-06-20 PROCEDURE — 80061 LIPID PANEL: CPT | Performed by: FAMILY MEDICINE

## 2018-06-20 PROCEDURE — 87389 HIV-1 AG W/HIV-1&-2 AB AG IA: CPT | Performed by: FAMILY MEDICINE

## 2018-06-20 PROCEDURE — G0145 SCR C/V CYTO,THINLAYER,RESCR: HCPCS | Performed by: FAMILY MEDICINE

## 2018-06-20 PROCEDURE — 99396 PREV VISIT EST AGE 40-64: CPT | Performed by: FAMILY MEDICINE

## 2018-06-20 PROCEDURE — 80053 COMPREHEN METABOLIC PANEL: CPT | Performed by: FAMILY MEDICINE

## 2018-06-20 PROCEDURE — 99213 OFFICE O/P EST LOW 20 MIN: CPT | Mod: 25 | Performed by: FAMILY MEDICINE

## 2018-06-20 PROCEDURE — 84443 ASSAY THYROID STIM HORMONE: CPT | Performed by: FAMILY MEDICINE

## 2018-06-20 NOTE — NURSING NOTE
"Chief Complaint   Patient presents with     Physical     initial /72 (BP Location: Left arm, Cuff Size: Adult Regular)  Pulse 83  Temp 97.2  F (36.2  C) (Oral)  Resp 16  Ht 5' 10.5\" (1.791 m)  Wt 167 lb (75.8 kg)  LMP 06/10/2018  SpO2 98%  BMI 23.62 kg/m2 Estimated body mass index is 23.62 kg/(m^2) as calculated from the following:    Height as of this encounter: 5' 10.5\" (1.791 m).    Weight as of this encounter: 167 lb (75.8 kg).  BP completed using cuff size: regular.  L   arm      Health Maintenance that is potentially due pending provider review:  ACT    PHQ/ACT/EVA--Gave pt questionnare    Keven Velazquez ma  "

## 2018-06-20 NOTE — PROGRESS NOTES
SUBJECTIVE:   CC: Marjorie Wolf is an 43 year old woman who presents for preventive health visit.     Physical   Annual:     Getting at least 3 servings of Calcium per day::  Yes    Bi-annual eye exam::  Yes    Dental care twice a year::  Yes    Sleep apnea or symptoms of sleep apnea::  None    Diet::  Vegetarian/vegan and Other    Frequency of exercise::  4-5 days/week    Duration of exercise::  30-45 minutes    Taking medications regularly::  Yes    Medication side effects::  None    Additional concerns today::  YES                PROBLEMS TO ADD ON...  Pt having left leg pain that started last Friday or approx 5 days ago  Pain located left lateral calf after stretch after a run  Seems worse with standing and improves with sitting.  No bone pain   No injury she is aware of   No leg weakness  No back pain    Today's PHQ-2 Score:   PHQ-2 ( 1999 Pfizer) 6/20/2018   Q1: Little interest or pleasure in doing things 0   Q2: Feeling down, depressed or hopeless 0   PHQ-2 Score 0   Q1: Little interest or pleasure in doing things Not at all   Q2: Feeling down, depressed or hopeless Not at all   PHQ-2 Score 0       Abuse: Current or Past(Physical, Sexual or Emotional)- No  Do you feel safe in your environment - Yes    Social History   Substance Use Topics     Smoking status: Never Smoker     Smokeless tobacco: Never Used     Alcohol use 0.0 oz/week     0 Standard drinks or equivalent per week      Comment: Occas.     Alcohol Use 6/20/2018   If you drink alcohol do you typically have greater than 3 drinks per day OR greater than 7 drinks per week? No   No flowsheet data found.    Reviewed orders with patient.  Reviewed health maintenance and updated orders accordingly - Yes  Patient Active Problem List   Diagnosis     Allergic rhinitis due to pollen     Family history of other cardiovascular diseases     Low back pain     Hypertriglyceridemia     CARDIOVASCULAR SCREENING; LDL GOAL LESS THAN 160     Intermittent asthma      Insomnia     SVT (supraventricular tachycardia) (H)     Family history of hemochromatosis     Hashimoto's thyroiditis     Thyroid enlargement     Thyroid nodule     ACP (advance care planning)     Past Surgical History:   Procedure Laterality Date     C APPENDECTOMY  4/1989     C NONSPECIFIC PROCEDURE  6/1990    Nasal surgery for deviated septum     C NONSPECIFIC PROCEDURE  11/1990    Nasal Surgery for deviated septum     SURGICAL HISTORY OF -   2000    wisdom teeth extraction       Social History   Substance Use Topics     Smoking status: Never Smoker     Smokeless tobacco: Never Used     Alcohol use 0.0 oz/week     0 Standard drinks or equivalent per week      Comment: Occas.     Family History   Problem Relation Age of Onset     Diabetes Mother      Type 2     Arthritis Mother      Depression Mother      anxiety/panic attacks as well     Obesity Mother      Thyroid Disease Mother      dx ~35yrs     Hypertension Mother      C.A.D. Father      MI at age 54     GASTROINTESTINAL DISEASE Father      Ulcer H-Pylori?     Lipids Father      Arthritis Maternal Grandmother      C.A.D. Maternal Grandfather      Artherosclerosis- cause of death, unsure if previous MI     Diabetes Maternal Grandfather      Type 2     Eye Disorder Maternal Grandfather      Glaucoma     Arthritis Sister      Depression Sister      Neurologic Disorder Sister      Migraines     Lipids Sister      Cerebrovascular Disease No family hx of      Breast Cancer No family hx of      Cancer - colorectal No family hx of            Patient under age 50, mutual decision reflected in health maintenance.      Pertinent mammograms are reviewed under the imaging tab.  History of abnormal Pap smear: NO - age 30-65 PAP every 5 years with negative HPV co-testing recommended    Reviewed and updated as needed this visit by clinical staff  Tobacco  Allergies  Meds  Problems         Reviewed and updated as needed this visit by Provider  Allergies  Meds   "Problems            Review of Systems  CONSTITUTIONAL: NEGATIVE for fever, chills, change in weight  INTEGUMENTARU/SKIN: NEGATIVE for worrisome rashes, moles or lesions  EYES: NEGATIVE for vision changes or irritation  ENT: NEGATIVE for ear, mouth and throat problems  RESP: NEGATIVE for significant cough or SOB  BREAST: NEGATIVE for masses, tenderness or discharge  CV: NEGATIVE for chest pain, palpitations or peripheral edema  GI: NEGATIVE for nausea, abdominal pain, heartburn, or change in bowel habits  : NEGATIVE for unusual urinary or vaginal symptoms. Periods are regular.  MUSCULOSKELETAL:as above  NEURO: NEGATIVE for weakness, dizziness or paresthesias  PSYCHIATRIC: NEGATIVE for changes in mood or affect     OBJECTIVE:   /72 (BP Location: Left arm, Cuff Size: Adult Regular)  Pulse 83  Temp 97.2  F (36.2  C) (Oral)  Resp 16  Ht 5' 10.5\" (1.791 m)  Wt 167 lb (75.8 kg)  LMP 06/10/2018  SpO2 98%  BMI 23.62 kg/m2  Physical Exam  GENERAL: healthy, alert and no distress  EYES: Eyes grossly normal to inspection, PERRL and conjunctivae and sclerae normal  HENT: ear canals and TM's normal, nose and mouth without ulcers or lesions  NECK: no adenopathy, no asymmetry, masses, or scars and thyroid small nodule appreciated  RESP: lungs clear to auscultation - no rales, rhonchi or wheezes  BREAST: normal without masses, tenderness or nipple discharge and no palpable axillary masses or adenopathy  CV: regular rate and rhythm, normal S1 S2, no S3 or S4, no murmur, click or rub, no peripheral edema and peripheral pulses strong  ABDOMEN: soft, nontender, no hepatosplenomegaly, no masses and bowel sounds normal   (female): normal female external genitalia, normal urethral meatus, vaginal mucosa pink, moist, well rugated, and normal cervix/adnexa/uterus without masses or discharge  MS: no gross musculoskeletal defects noted, no edema  L5 and S1 - good strength - normal exam   SKIN: no suspicious lesions or " "rashes  NEURO: Normal strength and tone, mentation intact and speech normal  PSYCH: mentation appears normal, affect normal/bright    ASSESSMENT/PLAN:   1. Encounter for routine adult health examination without abnormal findings  Mammogram number given  Pap repeated today -   - Pap imaged thin layer screen with HPV - recommended age 30 - 65 years (select HPV order below)  - HPV High Risk Types DNA Cervical  - HIV Antigen Antibody Combo    2. Hashimoto's thyroiditis  Hx of hashimotos - did see endocrine   Had repeat ultrasound and labs - was off the synthroid so advised she can stay off since at goal TSH   Will follow annually and do neck exam  - TSH with free T4 reflex  - Comprehensive metabolic panel (BMP + Alb, Alk Phos, ALT, AST, Total. Bili, TP)    3. Insomnia, unspecified type  Refilled  Can try melatonin for onset of sleep  - amitriptyline (ELAVIL) 25 MG tablet; TAKE 4 TABLETS BY MOUTH NIGHTLY AS NEEDED FOR SLEEP  Dispense: 360 tablet; Refill: 3  - Comprehensive metabolic panel (BMP + Alb, Alk Phos, ALT, AST, Total. Bili, TP)    4. Hypertriglyceridemia  pending  - Lipid panel reflex to direct LDL Fasting  - Comprehensive metabolic panel (BMP + Alb, Alk Phos, ALT, AST, Total. Bili, TP)    5. SVT (supraventricular tachycardia) (H)   no current issues  - TSH with free T4 reflex  - Comprehensive metabolic panel (BMP + Alb, Alk Phos, ALT, AST, Total. Bili, TP)    6. Pain of left lower leg  Left lateral leg pain -   Suspect radicular symptoms  Advised rest, NSAIDs and monitor for warning signs and symptms  Pt will call or RTC if symptoms worsen or do not improve.   If worsening will refer to sports medicine      COUNSELING:  Reviewed preventive health counseling, as reflected in patient instructions         reports that she has never smoked. She has never used smokeless tobacco.    Estimated body mass index is 23.62 kg/(m^2) as calculated from the following:    Height as of this encounter: 5' 10.5\" (1.791 m).    " Weight as of this encounter: 167 lb (75.8 kg).       Counseling Resources:  ATP IV Guidelines  Pooled Cohorts Equation Calculator  Breast Cancer Risk Calculator  FRAX Risk Assessment  ICSI Preventive Guidelines  Dietary Guidelines for Americans, 2010  USDA's MyPlate  ASA Prophylaxis  Lung CA Screening    Jeanette Rodrigues DO  [facility]  Answers for HPI/ROS submitted by the patient on 6/20/2018   PHQ-2 Score: 0

## 2018-06-20 NOTE — PATIENT INSTRUCTIONS
PLEASE CALL TO SCHEDULE YOUR MAMMOGRAM  [facility] [phone]  [facility] [place] [phone]        Preventive Health Recommendations  Female Ages 40 to 49    Yearly exam:     See your health care provider every year in order to  1. Review health changes.   2. Discuss preventive care.    3. Review your medicines if your doctor prescribed any.      Get a Pap test every three years (unless you have an abnormal result and your provider advises testing more often).      If you get Pap tests with HPV test, you only need to test every 5 years, unless you have an abnormal result. You do not need a Pap test if your uterus was removed (hysterectomy) and you have not had cancer.      You should be tested each year for STDs (sexually transmitted diseases), if you're at risk.       Ask your doctor if you should have a mammogram.      Have a colonoscopy (test for colon cancer) if someone in your family has had colon cancer or polyps before age 50.       Have a cholesterol test every 5 years.       Have a diabetes test (fasting glucose) after age 45. If you are at risk for diabetes, you should have this test every 3 years.    Shots: Get a flu shot each year. Get a tetanus shot every 10 years.     Nutrition:     Eat at least 5 servings of fruits and vegetables each day.    Eat whole-grain bread, whole-wheat pasta and brown rice instead of white grains and rice.    Talk to your provider about Calcium and Vitamin D.     Lifestyle    Exercise at least 150 minutes a week (an average of 30 minutes a day, 5 days a week). This will help you control your weight and prevent disease.    Limit alcohol to one drink per day.    No smoking.     Wear sunscreen to prevent skin cancer.    See your dentist every six months for an exam and cleaning.

## 2018-06-20 NOTE — MR AVS SNAPSHOT
After Visit Summary   6/20/2018    Marjorie Wolf    MRN: [id number]           Patient Information     Date Of Birth          1975        Visit Information        Provider Department      6/20/2018 8:30 AM Jeanette Rodrigues DO [facility]        Today's Diagnoses     Encounter for routine adult health examination without abnormal findings    -  1    Hashimoto's thyroiditis        Insomnia, unspecified type        Hypertriglyceridemia        SVT (supraventricular tachycardia) (H)          Care Instructions    PLEASE CALL TO SCHEDULE YOUR MAMMOGRAM  [facility] [phone]  [facility] [phone]        Preventive Health Recommendations  Female Ages 40 to 49    Yearly exam:     See your health care provider every year in order to  1. Review health changes.   2. Discuss preventive care.    3. Review your medicines if your doctor prescribed any.      Get a Pap test every three years (unless you have an abnormal result and your provider advises testing more often).      If you get Pap tests with HPV test, you only need to test every 5 years, unless you have an abnormal result. You do not need a Pap test if your uterus was removed (hysterectomy) and you have not had cancer.      You should be tested each year for STDs (sexually transmitted diseases), if you're at risk.       Ask your doctor if you should have a mammogram.      Have a colonoscopy (test for colon cancer) if someone in your family has had colon cancer or polyps before age 50.       Have a cholesterol test every 5 years.       Have a diabetes test (fasting glucose) after age 45. If you are at risk for diabetes, you should have this test every 3 years.    Shots: Get a flu shot each year. Get a tetanus shot every 10 years.     Nutrition:     Eat at least 5 servings of fruits and vegetables each day.    Eat whole-grain bread, whole-wheat pasta and brown rice instead of white grains  and rice.    Talk to your provider about Calcium and Vitamin D.     Lifestyle    Exercise at least 150 minutes a week (an average of 30 minutes a day, 5 days a week). This will help you control your weight and prevent disease.    Limit alcohol to one drink per day.    No smoking.     Wear sunscreen to prevent skin cancer.    See your dentist every six months for an exam and cleaning.          Follow-ups after your visit        Your next 10 appointments already scheduled     Aug 13, 2018  2:00 PM CDT   (Arrive by 1:45 PM)   RETURN ENDOCRINE with Brissa Salmeron MD   [facility] ([facility])    [address] [place] [address]  3rd [facility] [address] [phone]              Who to contact     If you have questions or need follow up information about today's clinic visit or your schedule please contact [facility] directly at [phone].  Normal or non-critical lab and imaging results will be communicated to you by MyChart, letter or phone within 4 business days after the clinic has received the results. If you do not hear from us within 7 days, please contact the clinic through [employer] or phone. If you have a critical or abnormal lab result, we will notify you by phone as soon as possible.  Submit refill requests through [employer] or call your pharmacy and they will forward the refill request to us. Please allow 3 business days for your refill to be completed.          Additional Information About Your Visit        [employer] Information     [employer] gives you secure access to your electronic health record. If you see a primary care provider, you can also send messages to your care team and make appointments. If you have questions, please call your primary care clinic.  If you do not have a primary care provider, please call [phone] and they will assist you.        Care EveryWhere ID     This is your Care EveryWhere ID. This could be used by other  "organizations to access your [place] medical records  [id number]        Your Vitals Were     Pulse Temperature Respirations Height Last Period Pulse Oximetry    83 97.2  F (36.2  C) (Oral) 16 5' 10.5\" (1.791 m) 06/10/2018 98%    BMI (Body Mass Index)                   23.62 kg/m2            Blood Pressure from Last 3 Encounters:   06/20/18 110/72   06/04/18 111/74   03/09/18 114/72    Weight from Last 3 Encounters:   06/20/18 167 lb (75.8 kg)   06/04/18 172 lb 12.8 oz (78.4 kg)   03/09/18 174 lb 4.8 oz (79.1 kg)              We Performed the Following     Comprehensive metabolic panel (BMP + Alb, Alk Phos, ALT, AST, Total. Bili, TP)     HIV Antigen Antibody Combo     HPV High Risk Types DNA Cervical     Lipid panel reflex to direct LDL Fasting     Pap imaged thin layer screen with HPV - recommended age 30 - 65 years (select HPV order below)     TSH with free T4 reflex          Where to get your medicines      These medications were sent to [id number] - [place], [place] - [address] AT [facility] OF [address]  [address], [place] [address]     Phone:  [phone]     amitriptyline 25 MG tablet          Primary Care Provider Office Phone # Fax #    Jeanette ISABEL DO Ravi [phone] [address] [address]        Equal Access to Services     JAY CORBIN AH: Hadii aad ku hadasho Soomaali, waaxda luqadaha, qaybta kaalmada adeegyada, waxay idiin hayzackn juan alberto barahona. So [facility] [phone].    ATENCIÓN: Si habla español, tiene a cerda disposición servicios gratuitos de asistencia lingüística. Llame al [phone].    We comply with applicable federal civil rights laws and [place] laws. We do not discriminate on the basis of race, color, national origin, age, disability, sex, sexual orientation, or gender identity.            Thank you!     Thank you for choosing [facility]  for your care. Our goal is always to " provide you with excellent care. Hearing back from our patients is one way we can continue to improve our services. Please take a few minutes to complete the written survey that you may receive in the mail after your visit with us. Thank you!             Your Updated Medication List - Protect others around you: Learn how to safely use, store and throw away your medicines at www.disposemymeds.org.          This list is accurate as of 6/20/18  9:14 AM.  Always use your most recent med list.                   Brand Name Dispense Instructions for use Diagnosis    amitriptyline 25 MG tablet    ELAVIL    360 tablet    TAKE 4 TABLETS BY MOUTH NIGHTLY AS NEEDED FOR SLEEP    Insomnia, unspecified type       fluticasone 50 MCG/ACT spray    FLONASE    1 Bottle    Spray 1-2 sprays into both nostrils daily    Acute sinusitis with symptoms > 10 days       LACTAID PO           levalbuterol 45 MCG/ACT Inhaler    XOPENEX HFA    75 g    INHALE 2 PUFFS INTO THE LUNGS AS NEEDED IN EMERGENCIES.    Intermittent asthma, uncomplicated       pirbuterol 200 MCG/INH Inhaler    MAXAIR AUTOHALER    1 Inhaler    Inhale 2 puffs into the lungs. Failed albuterol,PRN in emergencies    Intermittent asthma, uncomplicated       UNKNOWN MED DOSAGE      digestive advantage for lactose intolorance - 1 tab daily

## 2018-06-21 ASSESSMENT — ASTHMA QUESTIONNAIRES: ACT_TOTALSCORE: 25

## 2018-06-22 ENCOUNTER — MYC MEDICAL ADVICE (OUTPATIENT)
Dept: FAMILY MEDICINE | Facility: CLINIC | Age: 43
End: 2018-06-22

## 2018-06-22 LAB
COPATH REPORT: NORMAL
PAP: NORMAL

## 2018-06-22 NOTE — PROGRESS NOTES
Dear Marjorie,   Your test results are all back -   -Liver and gallbladder tests are normal. (ALT,AST, Alk phos, bilirubin), kidney function is normal (Cr, GFR), Sodium is normal, Potassium is normal, Calcium is normal, Glucose is normal (diabetes screening test).   -Cholesterol levels (LDL,HDL) are normal but the triglycerides are up a little.  ADVISE: rechecking in 1 year.  -TSH (thyroid stimulating hormone) level is normal which indicates normal thyroid function at this time.  Let us know if you have any questions.  -Jeanette Rodrigues, DO

## 2018-06-25 LAB
FINAL DIAGNOSIS: NORMAL
HPV HR 12 DNA CVX QL NAA+PROBE: NEGATIVE
HPV16 DNA SPEC QL NAA+PROBE: NEGATIVE
HPV18 DNA SPEC QL NAA+PROBE: NEGATIVE
SPECIMEN DESCRIPTION: NORMAL
SPECIMEN SOURCE CVX/VAG CYTO: NORMAL

## 2019-06-26 ENCOUNTER — OFFICE VISIT (OUTPATIENT)
Dept: FAMILY MEDICINE | Facility: CLINIC | Age: 44
End: 2019-06-26
Payer: COMMERCIAL

## 2019-06-26 VITALS
BODY MASS INDEX: 22.55 KG/M2 | HEIGHT: 71 IN | TEMPERATURE: 97.7 F | HEART RATE: 75 BPM | OXYGEN SATURATION: 100 % | DIASTOLIC BLOOD PRESSURE: 69 MMHG | SYSTOLIC BLOOD PRESSURE: 102 MMHG | WEIGHT: 161.1 LBS

## 2019-06-26 DIAGNOSIS — E06.3 HASHIMOTO'S THYROIDITIS: ICD-10-CM

## 2019-06-26 DIAGNOSIS — Z00.00 ROUTINE GENERAL MEDICAL EXAMINATION AT A HEALTH CARE FACILITY: Primary | ICD-10-CM

## 2019-06-26 DIAGNOSIS — Z83.49 FAMILY HISTORY OF HEMOCHROMATOSIS: ICD-10-CM

## 2019-06-26 DIAGNOSIS — G47.00 INSOMNIA, UNSPECIFIED TYPE: ICD-10-CM

## 2019-06-26 DIAGNOSIS — J45.20 INTERMITTENT ASTHMA, UNCOMPLICATED: ICD-10-CM

## 2019-06-26 LAB
ALBUMIN SERPL-MCNC: 4.2 G/DL (ref 3.4–5)
ALP SERPL-CCNC: 72 U/L (ref 40–150)
ALT SERPL W P-5'-P-CCNC: 22 U/L (ref 0–50)
ANION GAP SERPL CALCULATED.3IONS-SCNC: 10 MMOL/L (ref 3–14)
AST SERPL W P-5'-P-CCNC: 19 U/L (ref 0–45)
BILIRUB SERPL-MCNC: 0.7 MG/DL (ref 0.2–1.3)
BUN SERPL-MCNC: 8 MG/DL (ref 7–30)
CALCIUM SERPL-MCNC: 9.2 MG/DL (ref 8.5–10.1)
CHLORIDE SERPL-SCNC: 105 MMOL/L (ref 94–109)
CHOLEST SERPL-MCNC: 217 MG/DL
CO2 SERPL-SCNC: 24 MMOL/L (ref 20–32)
CREAT SERPL-MCNC: 0.81 MG/DL (ref 0.52–1.04)
ERYTHROCYTE [DISTWIDTH] IN BLOOD BY AUTOMATED COUNT: 13.8 % (ref 10–15)
FERRITIN SERPL-MCNC: 32 NG/ML (ref 12–150)
GFR SERPL CREATININE-BSD FRML MDRD: 89 ML/MIN/{1.73_M2}
GLUCOSE SERPL-MCNC: 86 MG/DL (ref 70–99)
HCT VFR BLD AUTO: 43.2 % (ref 35–47)
HDLC SERPL-MCNC: 67 MG/DL
HGB BLD-MCNC: 14.4 G/DL (ref 11.7–15.7)
LDLC SERPL CALC-MCNC: 126 MG/DL
MAGNESIUM SERPL-MCNC: 2.5 MG/DL (ref 1.6–2.3)
MCH RBC QN AUTO: 30.4 PG (ref 26.5–33)
MCHC RBC AUTO-ENTMCNC: 33.3 G/DL (ref 31.5–36.5)
MCV RBC AUTO: 91 FL (ref 78–100)
NONHDLC SERPL-MCNC: 150 MG/DL
PLATELET # BLD AUTO: 211 10E9/L (ref 150–450)
POTASSIUM SERPL-SCNC: 3.6 MMOL/L (ref 3.4–5.3)
PROT SERPL-MCNC: 8.1 G/DL (ref 6.8–8.8)
RBC # BLD AUTO: 4.73 10E12/L (ref 3.8–5.2)
SODIUM SERPL-SCNC: 139 MMOL/L (ref 133–144)
TRIGL SERPL-MCNC: 120 MG/DL
TSH SERPL DL<=0.005 MIU/L-ACNC: 2.21 MU/L (ref 0.4–4)
WBC # BLD AUTO: 5.4 10E9/L (ref 4–11)

## 2019-06-26 PROCEDURE — 83735 ASSAY OF MAGNESIUM: CPT | Performed by: FAMILY MEDICINE

## 2019-06-26 PROCEDURE — 82728 ASSAY OF FERRITIN: CPT | Performed by: FAMILY MEDICINE

## 2019-06-26 PROCEDURE — 84443 ASSAY THYROID STIM HORMONE: CPT | Performed by: FAMILY MEDICINE

## 2019-06-26 PROCEDURE — 85027 COMPLETE CBC AUTOMATED: CPT | Performed by: FAMILY MEDICINE

## 2019-06-26 PROCEDURE — 36415 COLL VENOUS BLD VENIPUNCTURE: CPT | Performed by: FAMILY MEDICINE

## 2019-06-26 PROCEDURE — 80061 LIPID PANEL: CPT | Performed by: FAMILY MEDICINE

## 2019-06-26 PROCEDURE — 99396 PREV VISIT EST AGE 40-64: CPT | Performed by: FAMILY MEDICINE

## 2019-06-26 PROCEDURE — 80053 COMPREHEN METABOLIC PANEL: CPT | Performed by: FAMILY MEDICINE

## 2019-06-26 RX ORDER — LEVALBUTEROL TARTRATE 45 UG/1
2 AEROSOL, METERED ORAL EVERY 4 HOURS PRN
Qty: 15 G | Refills: 2 | Status: SHIPPED | OUTPATIENT
Start: 2019-06-26 | End: 2020-09-23

## 2019-06-26 ASSESSMENT — MIFFLIN-ST. JEOR: SCORE: 1468.93

## 2019-06-26 NOTE — NURSING NOTE
"Chief Complaint   Patient presents with     Physical     /69   Pulse 75   Temp 97.7  F (36.5  C) (Oral)   Ht 1.791 m (5' 10.5\")   Wt 73.1 kg (161 lb 1.6 oz)   LMP 06/20/2019 (Approximate)   SpO2 100%   BMI 22.79 kg/m   Estimated body mass index is 22.79 kg/m  as calculated from the following:    Height as of this encounter: 1.791 m (5' 10.5\").    Weight as of this encounter: 73.1 kg (161 lb 1.6 oz).  Medication Reconciliation: complete      Health Maintenance that is potentially due pending provider review:  ACT    Possibly completing today per provider review.    JAH Riggs  "

## 2019-06-26 NOTE — LETTER
My Asthma Action Plan  Name: Marjorie Wolf   YOB: 1975  Date: 6/26/2019   My doctor: Jeanette Rodrigues, DO   My clinic: [facility]        My Control Medicine: None  My Rescue Medicine: maxair, xopenex   My Asthma Severity: intermittent  Avoid your asthma triggers: per patient               GREEN ZONE   Good Control    I feel good    No cough or wheeze    Can work, sleep and play without asthma symptoms       Take your asthma control medicine every day.     1. If exercise triggers your asthma, take your rescue medication    15 minutes before exercise or sports, and    During exercise if you have asthma symptoms  2. Spacer to use with inhaler: If you have a spacer, make sure to use it with your inhaler             YELLOW ZONE Getting Worse  I have ANY of these:    I do not feel good    Cough or wheeze    Chest feels tight    Wake up at night   1. Keep taking your Green Zone medications  2. Start taking your rescue medicine:    every 20 minutes for up to 1 hour. Then every 4 hours for 24-48 hours.  3. If you stay in the Yellow Zone for more than 12-24 hours, contact your doctor.  4. If you do not return to the Green Zone in 12-24 hours or you get worse, start taking your oral steroid medicine if prescribed by your provider.           RED ZONE Medical Alert - Get Help  I have ANY of these:    I feel awful    Medicine is not helping    Breathing getting harder    Trouble walking or talking    Nose opens wide to breathe       1. Take your rescue medicine NOW  2. If your provider has prescribed an oral steroid medicine, start taking it NOW  3. Call your doctor NOW  4. If you are still in the Red Zone after 20 minutes and you have not reached your doctor:    Take your rescue medicine again and    Call 911 or go to the emergency room right away    See your regular doctor within 2 weeks of an Emergency Room or Urgent Care visit for follow-up treatment.          Annual Reminders:  Meet with Asthma  Educator,  Flu Shot in the Fall, consider Pneumonia Vaccination for patients with asthma (aged 19 and older).    Pharmacy: [id number] - [place], [place] - [address] AT [facility] OF [address] & [facility]                      Asthma Triggers  How To Control Things That Make Your Asthma Worse    Triggers are things that make your asthma worse.  Look at the list below to help you find your triggers and what you can do about them.  You can help prevent asthma flare-ups by staying away from your triggers.      Trigger                                                          What you can do   Cigarette Smoke  Tobacco smoke can make asthma worse. Do not allow smoking in your home, car or around you.  Be sure no one smokes at a child s day care or school.  If you smoke, ask your health care provider for ways to help you quit.  Ask family members to quit too.  Ask your health care provider for a referral to Quit Plan to help you quit smoking, or call 7-836-620-PLAN.     Colds, Flu, Bronchitis  These are common triggers of asthma. Wash your hands often.  Don t touch your eyes, nose or mouth.  Get a flu shot every year.     Dust Mites  These are tiny bugs that live in cloth or carpet. They are too small to see. Wash sheets and blankets in hot water every week.   Encase pillows and mattress in dust mite proof covers.  Avoid having carpet if you can. If you have carpet, vacuum weekly.   Use a dust mask and HEPA vacuum.   Pollen and Outdoor Mold  Some people are allergic to trees, grass, or weed pollen, or molds. Try to keep your windows closed.  Limit time out doors when pollen count is high.   Ask you health care provider about taking medicine during allergy season.     Animal Dander  Some people are allergic to skin flakes, urine or saliva from pets with fur or feathers. Keep pets with fur or feathers out of your home.    If you can t keep the pet outdoors, then keep the pet out of your bedroom.  Keep the  bedroom door closed.  Keep pets off cloth furniture and away from stuffed toys.     Mice, Rats, and Cockroaches  Some people are allergic to the waste from these pests.   Cover food and garbage.  Clean up spills and food crumbs.  Store grease in the refrigerator.   Keep food out of the bedroom.   Indoor Mold  This can be a trigger if your home has high moisture. Fix leaking faucets, pipes, or other sources of water.   Clean moldy surfaces.  Dehumidify basement if it is damp and smelly.   Smoke, Strong Odors, and Sprays  These can reduce air quality. Stay away from strong odors and sprays, such as perfume, powder, hair spray, paints, smoke incense, paint, cleaning products, candles and new carpet.   Exercise or Sports  Some people with asthma have this trigger. Be active!  Ask your doctor about taking medicine before sports or exercise to prevent symptoms.    Warm up for 5-10 minutes before and after sports or exercise.     Other Triggers of Asthma  Cold air:  Cover your nose and mouth with a scarf.  Sometimes laughing or crying can be a trigger.  Some medicines and food can trigger asthma.

## 2019-06-26 NOTE — PATIENT INSTRUCTIONS
PLEASE CALL TO SCHEDULE YOUR MAMMOGRAM  [facility] [phone]  [facility] [phone]  [facility] [place]   [phone]  Central Scheduling (all locations) [phone]    If you are under/uninsured, we recommend you contact the [facility]. They offer mammograms on a sliding fee charge. You can schedule with them at [phone].      Preventive Health Recommendations  Female Ages 40 to 49    Yearly exam:     See your health care provider every year in order to  1. Review health changes.   2. Discuss preventive care.    3. Review your medicines if your doctor prescribed any.      Get a Pap test every three years (unless you have an abnormal result and your provider advises testing more often).      If you get Pap tests with HPV test, you only need to test every 5 years, unless you have an abnormal result. You do not need a Pap test if your uterus was removed (hysterectomy) and you have not had cancer.      You should be tested each year for STDs (sexually transmitted diseases), if you're at risk.     Ask your doctor if you should have a mammogram.      Have a colonoscopy (test for colon cancer) if someone in your family has had colon cancer or polyps before age 50.       Have a cholesterol test every 5 years.       Have a diabetes test (fasting glucose) after age 45. If you are at risk for diabetes, you should have this test every 3 years.    Shots: Get a flu shot each year. Get a tetanus shot every 10 years.     Nutrition:     Eat at least 5 servings of fruits and vegetables each day.    Eat whole-grain bread, whole-wheat pasta and brown rice instead of white grains and rice.    Get adequate Calcium and Vitamin D.      Lifestyle    Exercise at least 150 minutes a week (an average of 30 minutes a day, 5 days a week). This will help you control your weight and prevent disease.    Limit alcohol to one drink per day.    No smoking.     Wear sunscreen to prevent skin  cancer.    See your dentist every six months for an exam and cleaning.

## 2019-06-26 NOTE — PROGRESS NOTES
SUBJECTIVE:   CC: Marjorie Wolf is an 44 year old woman who presents for preventive health visit.     Healthy Habits:     Getting at least 3 servings of Calcium per day:  Yes    Bi-annual eye exam:  NO    Dental care twice a year:  Yes    Sleep apnea or symptoms of sleep apnea:  None    Diet:  Vegetarian/vegan    Frequency of exercise:  4-5 days/week    Duration of exercise:  45-60 minutes    Taking medications regularly:  Yes    Medication side effects:  None    PHQ-2 Total Score: 0    Additional concerns today:  Yes          -------------------------------------    Today's PHQ-2 Score:   PHQ-2 ( 1999 [employer]) 6/23/2019   Q1: Little interest or pleasure in doing things 0   Q2: Feeling down, depressed or hopeless 0   PHQ-2 Score 0   Q1: Little interest or pleasure in doing things Not at all   Q2: Feeling down, depressed or hopeless Not at all   PHQ-2 Score 0       Abuse: Current or Past(Physical, Sexual or Emotional)- No  Do you feel safe in your environment? Yes    Social History     Tobacco Use     Smoking status: Never Smoker     Smokeless tobacco: Never Used   Substance Use Topics     Alcohol use: Yes     Alcohol/week: 0.0 oz     Comment: Occas.         No flowsheet data found.    Reviewed orders with patient.  Reviewed health maintenance and updated orders accordingly - Yes  Patient Active Problem List   Diagnosis     Allergic rhinitis due to pollen     Family history of other cardiovascular diseases     Low back pain     Hypertriglyceridemia     CARDIOVASCULAR SCREENING; LDL GOAL LESS THAN 160     Intermittent asthma     Insomnia     SVT (supraventricular tachycardia) (H)     Family history of hemochromatosis     Hashimoto's thyroiditis     Thyroid enlargement     Thyroid nodule     ACP (advance care planning)     Past Surgical History:   Procedure Laterality Date     C APPENDECTOMY  4/1989     C NONSPECIFIC PROCEDURE  6/1990    Nasal surgery for deviated septum     C NONSPECIFIC PROCEDURE  11/1990     Nasal Surgery for deviated septum     SURGICAL HISTORY OF -   2000    wisdom teeth extraction       Social History     Tobacco Use     Smoking status: Never Smoker     Smokeless tobacco: Never Used   Substance Use Topics     Alcohol use: Yes     Alcohol/week: 0.0 oz     Comment: Occas.     Family History   Problem Relation Age of Onset     Diabetes Mother         Type 2     Arthritis Mother      Depression Mother         anxiety/panic attacks as well     Obesity Mother      Thyroid Disease Mother         dx ~35yrs     Hypertension Mother      C.A.D. Father         MI at age 54     Gastrointestinal Disease Father         Ulcer H-Pylori?     Lipids Father      Arthritis Maternal Grandmother      C.A.D. Maternal Grandfather         Artherosclerosis- cause of death, unsure if previous MI     Diabetes Maternal Grandfather         Type 2     Eye Disorder Maternal Grandfather         Glaucoma     Arthritis Sister      Depression Sister      Neurologic Disorder Sister         Migraines     Lipids Sister      Cerebrovascular Disease No family hx of      Breast Cancer No family hx of      Cancer - colorectal No family hx of            Mammogram Screening: Patient under age 50, mutual decision reflected in health maintenance.      Pertinent mammograms are reviewed under the imaging tab.  History of abnormal Pap smear: NO - age 30-65 PAP every 5 years with negative HPV co-testing recommended  PAP / HPV Latest Ref Rng & Units 6/20/2018 5/16/2014 3/22/2011   PAP - NIL NIL NIL   HPV 16 DNA NEG:Negative Negative - -   HPV 18 DNA NEG:Negative Negative - -   OTHER HR HPV NEG:Negative Negative - -     Reviewed and updated as needed this visit by clinical staff  Tobacco  Allergies  Meds  Problems  Med Hx  Surg Hx  Fam Hx         Reviewed and updated as needed this visit by Provider  Tobacco  Allergies  Meds  Problems  Med Hx  Surg Hx  Fam Hx            Review of Systems  CONSTITUTIONAL: NEGATIVE for fever, chills, change  "in weight  INTEGUMENTARU/SKIN: NEGATIVE for worrisome rashes, moles or lesions  EYES: NEGATIVE for vision changes or irritation  ENT: NEGATIVE for ear, mouth and throat problems  RESP: NEGATIVE for significant cough or SOB  BREAST: NEGATIVE for masses, tenderness or discharge  CV: NEGATIVE for chest pain, palpitations or peripheral edema  GI: NEGATIVE for nausea, abdominal pain, heartburn, or change in bowel habits  : NEGATIVE for unusual urinary or vaginal symptoms. Periods are regular.  MUSCULOSKELETAL: NEGATIVE for significant arthralgias or myalgia  NEURO: NEGATIVE for weakness, dizziness or paresthesias  PSYCHIATRIC: NEGATIVE for changes in mood or affect     OBJECTIVE:   /69   Pulse 75   Temp 97.7  F (36.5  C) (Oral)   Ht 1.791 m (5' 10.5\")   Wt 73.1 kg (161 lb 1.6 oz)   LMP 06/20/2019 (Approximate)   SpO2 100%   BMI 22.79 kg/m    Physical Exam  GENERAL: healthy, alert and no distress  EYES: Eyes grossly normal to inspection, PERRL and conjunctivae and sclerae normal  HENT: ear canals and TM's normal, nose and mouth without ulcers or lesions  NECK: no adenopathy, no asymmetry, masses, or scars and thyroid normal to palpation  RESP: lungs clear to auscultation - no rales, rhonchi or wheezes  BREAST: normal without masses, tenderness or nipple discharge and no palpable axillary masses or adenopathy  CV: regular rate and rhythm, normal S1 S2, no S3 or S4, no murmur, click or rub, no peripheral edema and peripheral pulses strong  ABDOMEN: soft, nontender, no hepatosplenomegaly, no masses and bowel sounds normal  MS: no gross musculoskeletal defects noted, no edema  SKIN: no suspicious lesions or rashes  NEURO: Normal strength and tone, mentation intact and speech normal  PSYCH: mentation appears normal, affect normal/bright    Diagnostic Test Results:  Labs reviewed in Epic  pending    ASSESSMENT/PLAN:   1. Routine general medical examination at a health care facility  Routine screening  Mammogram " "number given -   - Lipid panel reflex to direct LDL Fasting    2. Insomnia, unspecified type  Refilled   - Comprehensive metabolic panel (BMP + Alb, Alk Phos, ALT, AST, Total. Bili, TP)  - Magnesium  - amitriptyline (ELAVIL) 25 MG tablet; TAKE 3 TABLETS BY MOUTH NIGHTLY AS NEEDED FOR SLEEP  Dispense: 270 tablet; Refill: 3    3. Intermittent asthma, uncomplicated  refilled  - Comprehensive metabolic panel (BMP + Alb, Alk Phos, ALT, AST, Total. Bili, TP)  - levalbuterol (XOPENEX HFA) 45 MCG/ACT inhaler; Inhale 2 puffs into the lungs every 4 hours as needed for shortness of breath / dyspnea or wheezing  Dispense: 15 g; Refill: 2    4. Hashimoto's thyroiditis  Checking annually  - TSH with free T4 reflex    5. Family history of hemochromatosis  Checking labs   - CBC with platelets  - Ferritin    COUNSELING:  Reviewed preventive health counseling, as reflected in patient instructions    Estimated body mass index is 22.79 kg/m  as calculated from the following:    Height as of this encounter: 1.791 m (5' 10.5\").    Weight as of this encounter: 73.1 kg (161 lb 1.6 oz).         reports that she has never smoked. She has never used smokeless tobacco.      Counseling Resources:  ATP IV Guidelines  Pooled Cohorts Equation Calculator  Breast Cancer Risk Calculator  FRAX Risk Assessment  ICSI Preventive Guidelines  Dietary Guidelines for Americans, 2010  USDA's MyPlate  ASA Prophylaxis  Lung CA Screening    Jeanette Rodrigues, DO  [facility]  "

## 2019-06-27 ENCOUNTER — MYC MEDICAL ADVICE (OUTPATIENT)
Dept: FAMILY MEDICINE | Facility: CLINIC | Age: 44
End: 2019-06-27

## 2019-06-27 ASSESSMENT — ASTHMA QUESTIONNAIRES: ACT_TOTALSCORE: 25

## 2019-07-08 ENCOUNTER — HOSPITAL ENCOUNTER (OUTPATIENT)
Dept: MAMMOGRAPHY | Facility: CLINIC | Age: 44
Discharge: HOME | End: 2019-07-08
Attending: FAMILY MEDICINE | Admitting: FAMILY MEDICINE
Payer: COMMERCIAL

## 2019-07-08 DIAGNOSIS — Z12.31 VISIT FOR SCREENING MAMMOGRAM: ICD-10-CM

## 2019-07-08 PROCEDURE — 77067 SCR MAMMO BI INCL CAD: CPT

## 2019-07-12 ENCOUNTER — HOSPITAL ENCOUNTER (OUTPATIENT)
Dept: MAMMOGRAPHY | Facility: CLINIC | Age: 44
End: 2019-07-12
Attending: FAMILY MEDICINE
Payer: COMMERCIAL

## 2019-07-12 ENCOUNTER — HOSPITAL ENCOUNTER (OUTPATIENT)
Dept: MAMMOGRAPHY | Facility: CLINIC | Age: 44
Discharge: HOME | End: 2019-07-12
Attending: FAMILY MEDICINE | Admitting: FAMILY MEDICINE
Payer: COMMERCIAL

## 2019-07-12 DIAGNOSIS — R92.8 ABNORMAL MAMMOGRAM: ICD-10-CM

## 2019-07-12 PROCEDURE — 76642 ULTRASOUND BREAST LIMITED: CPT | Mod: LT

## 2019-07-12 PROCEDURE — G0279 TOMOSYNTHESIS, MAMMO: HCPCS

## 2019-10-30 ENCOUNTER — MYC MEDICAL ADVICE (OUTPATIENT)
Dept: FAMILY MEDICINE | Facility: CLINIC | Age: 44
End: 2019-10-30

## 2019-11-06 ENCOUNTER — HEALTH MAINTENANCE LETTER (OUTPATIENT)
Age: 44
End: 2019-11-06

## 2019-11-15 ENCOUNTER — ALLIED HEALTH/NURSE VISIT (OUTPATIENT)
Dept: NURSING | Facility: CLINIC | Age: 44
End: 2019-11-15
Payer: COMMERCIAL

## 2019-11-15 DIAGNOSIS — Z23 NEED FOR PROPHYLACTIC VACCINATION AND INOCULATION AGAINST INFLUENZA: Primary | ICD-10-CM

## 2019-11-15 PROCEDURE — 90471 IMMUNIZATION ADMIN: CPT

## 2019-11-15 PROCEDURE — 90686 IIV4 VACC NO PRSV 0.5 ML IM: CPT

## 2019-11-15 PROCEDURE — 99207 ZZC NO CHARGE NURSE ONLY: CPT

## 2019-12-05 ENCOUNTER — MYC MEDICAL ADVICE (OUTPATIENT)
Dept: FAMILY MEDICINE | Facility: CLINIC | Age: 44
End: 2019-12-05

## 2019-12-06 NOTE — TELEPHONE ENCOUNTER
PN    Please see rumrhart message below.  Patient aware you are out of the office until 12/11.    Thanks,  Adriana Barr RN

## 2020-01-06 ENCOUNTER — MYC MEDICAL ADVICE (OUTPATIENT)
Dept: FAMILY MEDICINE | Facility: CLINIC | Age: 45
End: 2020-01-06

## 2020-02-07 ENCOUNTER — OFFICE VISIT (OUTPATIENT)
Dept: FAMILY MEDICINE | Facility: CLINIC | Age: 45
End: 2020-02-07
Payer: COMMERCIAL

## 2020-02-07 VITALS
DIASTOLIC BLOOD PRESSURE: 46 MMHG | BODY MASS INDEX: 23.77 KG/M2 | SYSTOLIC BLOOD PRESSURE: 95 MMHG | HEART RATE: 85 BPM | HEIGHT: 71 IN | TEMPERATURE: 98 F | OXYGEN SATURATION: 96 % | WEIGHT: 169.8 LBS

## 2020-02-07 DIAGNOSIS — M48.061 DEGENERATIVE LUMBAR SPINAL STENOSIS: Primary | ICD-10-CM

## 2020-02-07 PROCEDURE — 99214 OFFICE O/P EST MOD 30 MIN: CPT | Performed by: FAMILY MEDICINE

## 2020-02-07 ASSESSMENT — PATIENT HEALTH QUESTIONNAIRE - PHQ9: SUM OF ALL RESPONSES TO PHQ QUESTIONS 1-9: 2

## 2020-02-07 ASSESSMENT — MIFFLIN-ST. JEOR: SCORE: 1508.4

## 2020-02-07 NOTE — NURSING NOTE
"Chief Complaint   Patient presents with     Pain     BP 95/46   Pulse 85   Temp 98  F (36.7  C) (Oral)   Ht 1.791 m (5' 10.5\")   Wt 77 kg (169 lb 12.8 oz)   SpO2 96%   BMI 24.02 kg/m   Estimated body mass index is 24.02 kg/m  as calculated from the following:    Height as of this encounter: 1.791 m (5' 10.5\").    Weight as of this encounter: 77 kg (169 lb 12.8 oz).  bp completed using cuff size: regular      Health Maintenance addressed:  ACT    Possibly completing today per provider review.    Luh Cancino MA    "

## 2020-02-07 NOTE — PROGRESS NOTES
Subjective     Marjorie Wolf is a 44 year old female who presents to clinic today for the following health issues:    HPI   Chronic Pain Follow-Up       Type / Location of Pain: Back, Neck  Analgesia/pain control:       Recent changes:  worse      Overall control: Tolerable with discomfort  Activity level/function:      Daily activities:  Able to do light housework, cooking, Able to do moderate activities, Can do most things most days, with some rest and Able to do all daily activities. Painful to move around but patient can.    Work:  Able to work part time without limitations  Adverse effects:  No  Adherance    Taking medication as directed?  No: patient isnt on any active meds she takes aleeve for pain prn    Participating in other treatments: no not currently, patient was seen a few years ago for pain mangement  Risk Factors:    Sleep:  Discomfort, sleep is fair    Mood/anxiety:  controlled    Recent family or social stressors:  none noted    Other aggravating factors: prolonged sitting and prolonged standing  PHQ-9 SCORE 3/22/2011 2/7/2020   PHQ-9 Total Score 3 -   PHQ-9 Total Score - 2     No flowsheet data found.  Encounter-Level CSA:    There are no encounter-level csa.     Patient-Level CSA:    There are no patient-level csa.         How many servings of fruits and vegetables do you eat daily?  4 or more    On average, how many sweetened beverages do you drink each day (Examples: soda, juice, sweet tea, etc.  Do NOT count diet or artificially sweetened beverages)?   1    How many days per week do you exercise enough to make your heart beat faster? 5    How many minutes a day do you exercise enough to make your heart beat faster? 30 - 60    How many days per week do you miss taking your medication? 0    Long hx of back issues  Over the past 6-12 months worsening  Low back pain  Has pain every day -   Aggravated - worse if moving to straight position from bent or seated position  If bad - will take aleve -  not sure if helpful    In 2011 - Saw Dr. Jairon Aguilar  Had PT evaluation and treatment -   Later had injections of the back and had help    Urine - no urinary symptoms      Also has neck pain that radiates down to the shoulder  Not sure if related -   ?connected sometimes but not always      -------------------------------------    Patient Active Problem List   Diagnosis     Allergic rhinitis due to pollen     Family history of other cardiovascular diseases     Low back pain     Hypertriglyceridemia     CARDIOVASCULAR SCREENING; LDL GOAL LESS THAN 160     Intermittent asthma     Insomnia     SVT (supraventricular tachycardia) (H)     Family history of hemochromatosis     Hashimoto's thyroiditis     Thyroid enlargement     Thyroid nodule     ACP (advance care planning)     Past Surgical History:   Procedure Laterality Date     C APPENDECTOMY  4/1989     C NONSPECIFIC PROCEDURE  6/1990    Nasal surgery for deviated septum     C NONSPECIFIC PROCEDURE  11/1990    Nasal Surgery for deviated septum     ENT SURGERY  1990    2 surgeries for deviated septum     SURGICAL HISTORY OF -   2000    wisdom teeth extraction       Social History     Tobacco Use     Smoking status: Never Smoker     Smokeless tobacco: Never Used   Substance Use Topics     Alcohol use: Yes     Alcohol/week: 0.0 standard drinks     Comment: Occas.     Family History   Problem Relation Age of Onset     Diabetes Mother         Type 2     Arthritis Mother      Depression Mother         anxiety/panic attacks as well     Obesity Mother      Thyroid Disease Mother         dx ~35yrs     Hypertension Mother      Hyperlipidemia Mother      C.A.D. Father         MI at age 54     Gastrointestinal Disease Father         Ulcer H-Pylori?     Lipids Father      Hypertension Father      Hyperlipidemia Father      Genetic Disorder Father      Arthritis Maternal Grandmother      C.A.D. Maternal Grandfather         Artherosclerosis- cause of death, unsure if previous MI      "Diabetes Maternal Grandfather         Type 2     Eye Disorder Maternal Grandfather         Glaucoma     Arthritis Sister      Depression Sister      Neurologic Disorder Sister         Migraines     Lipids Sister      Cerebrovascular Disease No family hx of      Breast Cancer No family hx of         Mother had biopsy 2016, found non-malignant     Cancer - colorectal No family hx of            Reviewed and updated as needed this visit by Provider  Tobacco  Allergies  Meds  Problems  Med Hx  Surg Hx  Fam Hx         Review of Systems   ROS COMP: Constitutional, HEENT, cardiovascular, pulmonary, GI, , musculoskeletal, neuro, skin, endocrine and psych systems are negative, except as otherwise noted.      Objective    BP 95/46   Pulse 85   Temp 98  F (36.7  C) (Oral)   Ht 1.791 m (5' 10.5\")   Wt 77 kg (169 lb 12.8 oz)   SpO2 96%   BMI 24.02 kg/m    Body mass index is 24.02 kg/m .  Physical Exam   GENERAL: healthy, alert and no distress  SKIN: no suspicious lesions or rashes  Comprehensive back pain exam:  Tenderness of right paralumbar area, Pain limits the following motions: extension and side bending, Lower extremity strength functional and equal on both sides, Lower extremity reflexes within normal limits bilaterally and Straight leg raise negative bilaterally    Diagnostic Test Results:  Labs reviewed in Epic        Assessment & Plan     1. Degenerative lumbar spinal stenosis  Hx of lumbar issues -   2011 - did find some of the notes from [facility] to review but was not able to find the MRI or all of the notes about the injection  Discussed options -   She did PT in the past and states she is doing the exercises at home everyday so not interested in doing at this time  Discussed rechecking MRI - she states she had stenosis in the past so will look at the upper lumbar area for issues.  Discussed referral for eval for possible injection but will await the results of the MRI   If worsening or " concerning findings will refer to spine surgeon  - MR Lumbar Spine w/o Contrast; Future       Pt will call or RTC if symptoms worsen or do not improve.      Return in about 4 weeks (around 3/6/2020) for If symptoms worsen or do not improve.    Jeanette Rodrigues, [facility]

## 2020-02-08 ASSESSMENT — ASTHMA QUESTIONNAIRES: ACT_TOTALSCORE: 25

## 2020-02-21 ENCOUNTER — ANCILLARY PROCEDURE (OUTPATIENT)
Dept: MRI IMAGING | Facility: CLINIC | Age: 45
End: 2020-02-21
Attending: FAMILY MEDICINE
Payer: COMMERCIAL

## 2020-02-21 DIAGNOSIS — M48.061 DEGENERATIVE LUMBAR SPINAL STENOSIS: ICD-10-CM

## 2020-02-25 ENCOUNTER — MYC MEDICAL ADVICE (OUTPATIENT)
Dept: FAMILY MEDICINE | Facility: CLINIC | Age: 45
End: 2020-02-25

## 2020-02-25 DIAGNOSIS — M54.50 BILATERAL LOW BACK PAIN WITHOUT SCIATICA, UNSPECIFIED CHRONICITY: Primary | ICD-10-CM

## 2020-02-25 DIAGNOSIS — M51.46: ICD-10-CM

## 2020-02-25 DIAGNOSIS — R93.7 BONE MARROW EDEMA: ICD-10-CM

## 2020-02-28 NOTE — TELEPHONE ENCOUNTER
DIAGNOSIS: Bilateral low back pain without sciatica, Node Schmorl's, lumbar, bone marrow edema / 2/21/2020 MRI Lumbar Spine, no previous surgeries / appt per pt / scheduled w/ Chris since referred by PCP   APPOINTMENT DATE: 4/17   NOTES STATUS DETAILS   OFFICE NOTE from referring provider Internal Jeanette Rodrigues DO   OFFICE NOTE from other specialist Internal    DISCHARGE SUMMARY from hospital N/A    DISCHARGE REPORT from the ER N/A    OPERATIVE REPORT N/A    MEDICATION LIST Internal    MRI Internal 2/21/20  11/12/10   CT SCAN N/A    XRAYS (IMAGES & REPORTS) Internal 10/13/10  4/24/09

## 2020-03-05 ENCOUNTER — TELEPHONE (OUTPATIENT)
Dept: ORTHOPEDICS | Facility: CLINIC | Age: 45
End: 2020-03-05

## 2020-03-05 NOTE — TELEPHONE ENCOUNTER
Spoke to Spine surgeon nurses she has a recent MRI if she would like to consult with a surgeon.  Otherwise  Dr. Perez he does have openings sooner in [place]

## 2020-04-17 ENCOUNTER — VIRTUAL VISIT (OUTPATIENT)
Dept: ORTHOPEDICS | Facility: CLINIC | Age: 45
End: 2020-04-17
Attending: FAMILY MEDICINE
Payer: COMMERCIAL

## 2020-04-17 ENCOUNTER — PRE VISIT (OUTPATIENT)
Dept: ORTHOPEDICS | Facility: CLINIC | Age: 45
End: 2020-04-17

## 2020-04-17 DIAGNOSIS — M54.50 BILATERAL LOW BACK PAIN WITHOUT SCIATICA, UNSPECIFIED CHRONICITY: ICD-10-CM

## 2020-04-17 DIAGNOSIS — M51.46: ICD-10-CM

## 2020-04-17 DIAGNOSIS — R93.7 BONE MARROW EDEMA: ICD-10-CM

## 2020-04-17 RX ORDER — METHYLPREDNISOLONE 4 MG
TABLET, DOSE PACK ORAL
Qty: 21 TABLET | Refills: 0 | Status: SHIPPED | OUTPATIENT
Start: 2020-04-17 | End: 2020-05-20

## 2020-04-17 NOTE — PROGRESS NOTES
"Reason For Visit:   Chief Complaint   Patient presents with     RECHECK     Bilateral low back pain without sciatica, Node Schmorl's, lumbar, bone marrow edema      There were no vitals taken for this visit.    Pain Assessment  Patient Currently in Pain: Yes  0-10 Pain Scale: 6  Primary Pain Location: Back  Other Pain Locations: favors Right side, lower back.  Pain Descriptors: Aching  Alleviating Factors: Stretching  Aggravating Factors: Standing, Movement, Walking, Lying    Lyndsey Baum ATC     After review of patient's medical issues this visit was conducted over the video phone, as opposed to in person, in effort to reduce risk of COVID-19 exposure.      Marjorie Wolf is a 45 year old female who is being evaluated via a billable video visit.      The patient has been notified of following:     \"This video visit will be conducted via a call between you and your physician/provider. We have found that certain health care needs can be provided without the need for an in-person physical exam.  This service lets us provide the care you need with a video conversation.  If a prescription is necessary we can send it directly to your pharmacy.  If lab work is needed we can place an order for that and you can then stop by our lab to have the test done at a later time.    Video visits are billed at different rates depending on your insurance coverage.  Please reach out to your insurance provider with any questions.    If during the course of the call the physician/provider feels a video visit is not appropriate, you will not be charged for this service.\"    Patient has given verbal consent for Video visit? Yes    How would you like to obtain your AVS? Juan    Patient would like the video invitation sent by: Send to e-mail at: [email or web address]      Video Start Time: 1:28pm    Additional provider notes:   HISTORY OF PRESENT ILLNESS  Ms. Wolf is a pleasant 45 year old year old female who presents to clinic today " with low back pain that has recurred  She previously had some ESIs several years ago and got a lot better  Marjorie explains that she had an MRI on her lumbar spine about 2 months ago  Location: low back  Quality:  achy pain    Severity: 8/10 at worst    Duration: 6+ months  Timing: occurs intermittently  Context: occurs every day  Modifying factors:  resting and non-use makes it better, movement and use makes it worse  Associated signs & symptoms: radiation into legs, left leg worse  Additional history: as documented    MEDICAL HISTORY  Patient Active Problem List   Diagnosis     Allergic rhinitis due to pollen     Family history of other cardiovascular diseases     Low back pain     Hypertriglyceridemia     CARDIOVASCULAR SCREENING; LDL GOAL LESS THAN 160     Intermittent asthma     Insomnia     SVT (supraventricular tachycardia) (H)     Family history of hemochromatosis     Hashimoto's thyroiditis     Thyroid enlargement     Thyroid nodule     ACP (advance care planning)       Current Outpatient Medications   Medication Sig Dispense Refill     amitriptyline (ELAVIL) 25 MG tablet TAKE 3 TABLETS BY MOUTH NIGHTLY AS NEEDED FOR SLEEP 270 tablet 3     Lactase (LACTAID PO)        levalbuterol (XOPENEX HFA) 45 MCG/ACT inhaler Inhale 2 puffs into the lungs every 4 hours as needed for shortness of breath / dyspnea or wheezing 15 g 2     MAGNESIUM PO        UNKNOWN MED DOSAGE digestive advantage for lactose intolorance - 1 tab daily       VITAMIN D, CHOLECALCIFEROL, PO Take by mouth daily         Allergies   Allergen Reactions     Erythromycin Rash     Penicillins Rash     Sulfa Drugs Rash       Family History   Problem Relation Age of Onset     Diabetes Mother         Type 2     Arthritis Mother      Depression Mother         anxiety/panic attacks as well     Obesity Mother      Thyroid Disease Mother         dx ~35yrs     Hypertension Mother      Hyperlipidemia Mother      C.A.D. Father         MI at age 54      Gastrointestinal Disease Father         Ulcer H-Pylori?     Lipids Father      Hypertension Father      Hyperlipidemia Father      Genetic Disorder Father      Arthritis Maternal Grandmother      C.A.D. Maternal Grandfather         Artherosclerosis- cause of death, unsure if previous MI     Diabetes Maternal Grandfather         Type 2     Eye Disorder Maternal Grandfather         Glaucoma     Arthritis Sister      Depression Sister      Neurologic Disorder Sister         Migraines     Lipids Sister      Cerebrovascular Disease No family hx of      Breast Cancer No family hx of         Mother had biopsy 2016, found non-malignant     Cancer - colorectal No family hx of      Social History     Socioeconomic History     Marital status:      Spouse name: Maddie Renee     Number of children: 0     Years of education: Ph.D     Highest education level: Not on file   Occupational History     Occupation:      Employer: [employer]     Comment: Sociology   Social Needs     Financial resource strain: Not on file     Food insecurity     Worry: Not on file     Inability: Not on file     Transportation needs     Medical: Not on file     Non-medical: Not on file   Tobacco Use     Smoking status: Never Smoker     Smokeless tobacco: Never Used   Substance and Sexual Activity     Alcohol use: Yes     Alcohol/week: 0.0 standard drinks     Comment: Occas.     Drug use: No     Sexual activity: Yes     Partners: Female     Birth control/protection: None     Comment: stable relationship   Lifestyle     Physical activity     Days per week: Not on file     Minutes per session: Not on file     Stress: Not on file   Relationships     Social connections     Talks on phone: Not on file     Gets together: Not on file     Attends Latter day service: Not on file     Active member of club or organization: Not on file     Attends meetings of clubs or organizations: Not on file     Relationship status: Not on file      Intimate partner violence     Fear of current or ex partner: Not on file     Emotionally abused: Not on file     Physically abused: Not on file     Forced sexual activity: Not on file   Other Topics Concern     Parent/sibling w/ CABG, MI or angioplasty before 65F 55M? Yes   Social History Narrative    Social Documentation:3/19/10        Balanced Diet: YES    Calcium intake: more than 2 per day    Caffeine: 1-2 cups per day    Exercise:  type of activity gym;  4 times per week    Sunscreen: Yes    Seatbelts:  Yes    Self Breast Exam:  No    Self Testicular Exam: n/a    Physical/Emotional/Sexual Abuse: No    Do you feel safe in your environment? Yes        Cholesterol screen up to date: Lab Test   3/4/09    2/2/07                  0934      0911         CHOL       194       179          HDL        36*       51           LDL        107       103          TRIG       256*      124          CHOLHDLRA* 5.5*      3.5              Eye Exam up to date: No    Dental Exam up to date: Yes    Pap smear up to date: PAP      NIL   3/4/2009    Mammogram up to date: Does Not Apply    Dexa Scan up to date: Does Not Apply    Colonoscopy up to date: Does Not Apply    Immunizations up to date: Yes last td 2005    Glucose screen if over 40:  Yes        Kacy Mccabe CMA                            Additional medical/Social/Surgical histories [facility] and updated as appropriate.     REVIEW OF SYSTEMS (4/17/2020)  10 point ROS of systems including Constitutional, Eyes, Respiratory, Cardiovascular, Gastroenterology, Genitourinary, Integumentary, Musculoskeletal, Psychiatric, Allergic/Immunologic were all negative except for pertinent positives noted in my HPI.     PHYSICAL EXAM      General  - normal appearance, in no obvious distress  HEENT  - conjunctivae not injected, moist mucous membranes, normocephalic/atraumatic head, ears normal appearance, no lesions, mouth normal appearance, no scars, normal dentition and teeth  present  CV  - normal peripheral perfusion  Pulm  - normal respiratory pattern, non-labored  Musculoskeletal - lumbar spine  - stance: normal gait without limp, no obvious leg length discrepancy, normal heel and toe walk  - inspection: normal bone and joint alignment, no obvious scoliosis  - palpation: has some tenderness in lumbar muslces to palpation  - ROM: flexion exacerbates pain, normal extension, sidebending, rotation, with some low back discomfort  - strength: states no loss of strength in LE    Neuro   grossly normal coordination, normal muscle tone  Skin  - no ecchymosis, erythema, warmth, or induration, no obvious rash  Psych  - interactive, appropriate, normal mood and affect    ASSESSMENT & PLAN  44 yo female with lumbar disc herniations, radicular pain  Reviewed lumbar MRI: shows herniated lumbar discs/ddd  Consider KENNEDY after pandemic  Start medrol and tizanadine PRN  Cont. HEP  F/u 1 week        Lucien Perez MD, CAQSM      Video-Visit Details    Type of service:  Video Visit    Video End Time (time video stopped): 1:39pm    Originating Location (pt. Location): Home    Distant Location (provider location):  [facility]     Mode of Communication:  Video Conference via [employer]      Lucien Perez MD

## 2020-05-01 ENCOUNTER — VIRTUAL VISIT (OUTPATIENT)
Dept: ORTHOPEDICS | Facility: CLINIC | Age: 45
End: 2020-05-01
Payer: COMMERCIAL

## 2020-05-01 DIAGNOSIS — M51.26 LUMBAR DISC HERNIATION: Primary | ICD-10-CM

## 2020-05-01 RX ORDER — PREDNISONE 20 MG/1
40 TABLET ORAL DAILY
Qty: 10 TABLET | Refills: 0 | Status: SHIPPED | OUTPATIENT
Start: 2020-05-01 | End: 2020-05-20

## 2020-05-01 NOTE — PROGRESS NOTES
"Reason For Visit:   Chief Complaint   Patient presents with     Follow Up     Bilateral low back pain without sciatica, Node Schmorl's, lumbar, bone marrow edema       There were no vitals taken for this visit.    Pain Assessment  Patient Currently in Pain: Yes  0-10 Pain Scale: 4  Primary Pain Location: Back    Lyndsey Baum DOROTHY    After review of patient's medical issues this visit was conducted over the phone, as opposed to in person, in effort to reduce risk of COVID-19 exposure.      Marjorie Wolf is a 45 year old female who is being evaluated via a billable video visit.      The patient has been notified of following:     \"This video visit will be conducted via a call between you and your physician/provider. We have found that certain health care needs can be provided without the need for an in-person physical exam.  This service lets us provide the care you need with a video conversation.  If a prescription is necessary we can send it directly to your pharmacy.  If lab work is needed we can place an order for that and you can then stop by our lab to have the test done at a later time.    Video visits are billed at different rates depending on your insurance coverage.  Please reach out to your insurance provider with any questions.    If during the course of the call the physician/provider feels a video visit is not appropriate, you will not be charged for this service.\"    Patient has given verbal consent for Video visit? Yes    How would you like to obtain your AVS? Juan    Patient would like the video invitation sent by: Text to cell phone: [phone]    HISTORY OF PRESENT ILLNESS  Ms. Wolf is a pleasant 45 year old year old female who presents to clinic for followup for her low back pain and radicular pain  Has not had much improvement since last visit  Wants to make a plan for KENNEDY if possible   Location: low back  Quality:  achy pain    Severity: 7/10 at worst    Duration: several " months  Timing: occurs intermittently  Context: occurs while exercising and lifting  Modifying factors:  resting and non-use makes it better, movement and use makes it worse  Associated signs & symptoms: radiation of pain into legs  Additional history: as documented    MEDICAL HISTORY  Patient Active Problem List   Diagnosis     Allergic rhinitis due to pollen     Family history of other cardiovascular diseases     Low back pain     Hypertriglyceridemia     CARDIOVASCULAR SCREENING; LDL GOAL LESS THAN 160     Intermittent asthma     Insomnia     SVT (supraventricular tachycardia) (H)     Family history of hemochromatosis     Hashimoto's thyroiditis     Thyroid enlargement     Thyroid nodule     ACP (advance care planning)       Current Outpatient Medications   Medication Sig Dispense Refill     amitriptyline (ELAVIL) 25 MG tablet TAKE 3 TABLETS BY MOUTH NIGHTLY AS NEEDED FOR SLEEP 270 tablet 3     Lactase (LACTAID PO)        levalbuterol (XOPENEX HFA) 45 MCG/ACT inhaler Inhale 2 puffs into the lungs every 4 hours as needed for shortness of breath / dyspnea or wheezing 15 g 2     MAGNESIUM PO        methylPREDNISolone (MEDROL DOSEPAK) 4 MG tablet therapy pack Follow Package Directions 21 tablet 0     predniSONE (DELTASONE) 20 MG tablet Take 2 tablets (40 mg) by mouth daily 10 tablet 0     tiZANidine (ZANAFLEX) 4 MG tablet Take 1-2 tablets (4-8 mg) by mouth nightly as needed 60 tablet 1     tiZANidine (ZANAFLEX) 4 MG tablet Take 1-2 tablets (4-8 mg) by mouth nightly as needed 30 tablet 1     UNKNOWN MED DOSAGE digestive advantage for lactose intolorance - 1 tab daily       VITAMIN D, CHOLECALCIFEROL, PO Take by mouth daily         Allergies   Allergen Reactions     Erythromycin Rash     Penicillins Rash     Sulfa Drugs Rash       Family History   Problem Relation Age of Onset     Diabetes Mother         Type 2     Arthritis Mother      Depression Mother         anxiety/panic attacks as well     Obesity Mother       Thyroid Disease Mother         dx ~35yrs     Hypertension Mother      Hyperlipidemia Mother      C.A.D. Father         MI at age 54     Gastrointestinal Disease Father         Ulcer H-Pylori?     Lipids Father      Hypertension Father      Hyperlipidemia Father      Genetic Disorder Father      Arthritis Maternal Grandmother      C.A.D. Maternal Grandfather         Artherosclerosis- cause of death, unsure if previous MI     Diabetes Maternal Grandfather         Type 2     Eye Disorder Maternal Grandfather         Glaucoma     Arthritis Sister      Depression Sister      Neurologic Disorder Sister         Migraines     Lipids Sister      Cerebrovascular Disease No family hx of      Breast Cancer No family hx of         Mother had biopsy 2016, found non-malignant     Cancer - colorectal No family hx of      Social History     Socioeconomic History     Marital status:      Spouse name: Maddie Renee     Number of children: 0     Years of education: Ph.D     Highest education level: Not on file   Occupational History     Occupation:      Employer: [employer]     Comment: Sociology   Social Needs     Financial resource strain: Not on file     Food insecurity     Worry: Not on file     Inability: Not on file     Transportation needs     Medical: Not on file     Non-medical: Not on file   Tobacco Use     Smoking status: Never Smoker     Smokeless tobacco: Never Used   Substance and Sexual Activity     Alcohol use: Yes     Alcohol/week: 0.0 standard drinks     Comment: Occas.     Drug use: No     Sexual activity: Yes     Partners: Female     Birth control/protection: None     Comment: stable relationship   Lifestyle     Physical activity     Days per week: Not on file     Minutes per session: Not on file     Stress: Not on file   Relationships     Social connections     Talks on phone: Not on file     Gets together: Not on file     Attends Yazidism service: Not on file     Active member of club  or organization: Not on file     Attends meetings of clubs or organizations: Not on file     Relationship status: Not on file     Intimate partner violence     Fear of current or ex partner: Not on file     Emotionally abused: Not on file     Physically abused: Not on file     Forced sexual activity: Not on file   Other Topics Concern     Parent/sibling w/ CABG, MI or angioplasty before 65F 55M? Yes   Social History Narrative    Social Documentation:3/19/10        Balanced Diet: YES    Calcium intake: more than 2 per day    Caffeine: 1-2 cups per day    Exercise:  type of activity gym;  4 times per week    Sunscreen: Yes    Seatbelts:  Yes    Self Breast Exam:  No    Self Testicular Exam: n/a    Physical/Emotional/Sexual Abuse: No    Do you feel safe in your environment? Yes        Cholesterol screen up to date: Lab Test   3/4/09    2/2/07                  0934      0911         CHOL       194       179          HDL        36*       51           LDL        107       103          TRIG       256*      124          CHOLHDLRA* 5.5*      3.5              Eye Exam up to date: No    Dental Exam up to date: Yes    Pap smear up to date: PAP      NIL   3/4/2009    Mammogram up to date: Does Not Apply    Dexa Scan up to date: Does Not Apply    Colonoscopy up to date: Does Not Apply    Immunizations up to date: Yes last td 2005    Glucose screen if over 40:  Yes        Kacy Mccabe CMA                            Additional medical/Social/Surgical histories reviewed in [facility] and updated as appropriate.     REVIEW OF SYSTEMS (5/3/2020)  10 point ROS of systems including Constitutional, Eyes, Respiratory, Cardiovascular, Gastroenterology, Genitourinary, Integumentary, Musculoskeletal, Psychiatric, Allergic/Immunologic were all negative except for pertinent positives noted in my HPI.     PHYSICAL EXAM    General  - normal appearance, in no obvious distress  HEENT  - conjunctivae not injected, moist mucous membranes,  normocephalic/atraumatic head, ears normal appearance, no lesions, mouth normal appearance, no scars, normal dentition and teeth present  CV  - normal peripheral perfusion  Pulm  - normal respiratory pattern, non-labored  Musculoskeletal - lumbar spine  - stance: normal gait without limp, no obvious leg length discrepancy, normal heel and toe walk  - inspection: normal bone and joint alignment, no obvious scoliosis    - ROM: flexion exacerbates pain, normal extension, sidebending, rotation, all cause pain    Neuro   grossly normal coordination, normal muscle tone  Skin  - no ecchymosis, erythema, warmth, or induration, no obvious rash  Psych  - interactive, appropriate, normal mood and affect  ASSESSMENT & PLAN  44 yo female with lumbar radicular pain, ddd, not improved  Will RX for prednisone and tizanadine  Cont. HEP  Will f/u 1-2 weeks and consider ordering KENNEDY    Lucien Perez MD, CAQSM      Video-Visit Details    Type of service:  Video Visit    Video Start Time: 3:40pm  Video End Time: 3:53pm    Originating Location (pt. Location): Home    Distant Location (provider location):  [facility]     Platform used for Video Visit: [facility]    Lucien Perez MD

## 2020-05-04 ENCOUNTER — TELEPHONE (OUTPATIENT)
Dept: ORTHOPEDICS | Facility: CLINIC | Age: 45
End: 2020-05-04

## 2020-05-04 NOTE — TELEPHONE ENCOUNTER
----- Message from Lyndsey Baum sent at 5/4/2020  9:59 AM CDT -----  Regarding: schedule a follow up  Please make followup appt for her in 1-2 weeks for virtual visit. (on Ortho or walk in schedule)  Thanks!!     
normal...

## 2020-05-05 ENCOUNTER — TELEPHONE (OUTPATIENT)
Dept: ORTHOPEDICS | Facility: CLINIC | Age: 45
End: 2020-05-05

## 2020-05-05 NOTE — TELEPHONE ENCOUNTER
Called and schedule appt. Offer 1-2 week Follow-up like Dr Perez stated. She said she's doing well and wanted the follow up on the 15th.

## 2020-05-05 NOTE — TELEPHONE ENCOUNTER
LVM to schedule    ----- Message from Lyndsey Baum sent at 5/4/2020  9:59 AM CDT -----  Regarding: schedule a follow up  Please make followup appt for her in 1-2 weeks for virtual visit. (on Ortho or walk in schedule)  Thanks!!

## 2020-05-05 NOTE — TELEPHONE ENCOUNTER
[facility] Call Center    Phone Message    May a detailed message be left on voicemail: yes     Reason for Call: Other: Pt called and requested to schedule video visit follow up. per call center call, writer is not allow to schedule video visit. Please call back pt. Thanks.     Action Taken: Message routed to:  [facility] ORTHO    Travel Screening: Not Applicable

## 2020-05-15 ENCOUNTER — VIRTUAL VISIT (OUTPATIENT)
Dept: ORTHOPEDICS | Facility: CLINIC | Age: 45
End: 2020-05-15
Payer: COMMERCIAL

## 2020-05-15 ENCOUNTER — TELEPHONE (OUTPATIENT)
Dept: ORTHOPEDICS | Facility: CLINIC | Age: 45
End: 2020-05-15

## 2020-05-15 DIAGNOSIS — M51.26 LUMBAR DISC HERNIATION: Primary | ICD-10-CM

## 2020-05-15 NOTE — TELEPHONE ENCOUNTER
[facility] Call Center    Phone Message    May a detailed message be left on voicemail: yes     Reason for Call: Other: Pt called and requested for the order, X-ray Lumbar epidural injection to be faxed to [facility] in [place]. Please call back pt for any further concenrns. Thanks.     Action Taken: Message routed to:  [facility] ORTHO    Travel Screening: Not Applicable

## 2020-05-15 NOTE — PROGRESS NOTES
"Marjorie Wolf is a 45 year old female who is being evaluated via a billable video visit.      The patient has been notified of following:     \"This video visit will be conducted via a call between you and your physician/provider. We have found that certain health care needs can be provided without the need for an in-person physical exam.  This service lets us provide the care you need with a video conversation.  If a prescription is necessary we can send it directly to your pharmacy.  If lab work is needed we can place an order for that and you can then stop by our lab to have the test done at a later time.    Video visits are billed at different rates depending on your insurance coverage.  Please reach out to your insurance provider with any questions.    If during the course of the call the physician/provider feels a video visit is not appropriate, you will not be charged for this service.\"    Patient has given verbal consent for Video visit? Yes    How would you like to obtain your AVS? Juan    Patient would like the video invitation sent by: Text to cell phone: [phone]    HISTORY OF PRESENT ILLNESS  Ms. Wolf is a pleasant 45 year old year old female who is following up for her low back pain that radiates into her buttocks  The oral medications help somewhat, especially at night with muscle relaxant but not improving  Location: low back  Quality:  achy pain    Severity: 6/10 at worst    Duration: 6 months  Timing: occurs intermittently  Context: occurs daily  Modifying factors:  resting and non-use makes it better, movement and use makes it worse  Associated signs & symptoms: radiation of pain into right buttocks and low back  Additional history: as documented    MEDICAL HISTORY  Patient Active Problem List   Diagnosis     Allergic rhinitis due to pollen     Family history of other cardiovascular diseases     Low back pain     Hypertriglyceridemia     CARDIOVASCULAR SCREENING; LDL GOAL LESS THAN 160 "     Intermittent asthma     Insomnia     SVT (supraventricular tachycardia) (H)     Family history of hemochromatosis     Hashimoto's thyroiditis     Thyroid enlargement     Thyroid nodule     ACP (advance care planning)       Current Outpatient Medications   Medication Sig Dispense Refill     amitriptyline (ELAVIL) 25 MG tablet TAKE 3 TABLETS BY MOUTH NIGHTLY AS NEEDED FOR SLEEP 270 tablet 3     Lactase (LACTAID PO)        levalbuterol (XOPENEX HFA) 45 MCG/ACT inhaler Inhale 2 puffs into the lungs every 4 hours as needed for shortness of breath / dyspnea or wheezing 15 g 2     MAGNESIUM PO        methylPREDNISolone (MEDROL DOSEPAK) 4 MG tablet therapy pack Follow Package Directions 21 tablet 0     predniSONE (DELTASONE) 20 MG tablet Take 2 tablets (40 mg) by mouth daily 10 tablet 0     tiZANidine (ZANAFLEX) 4 MG tablet Take 1-2 tablets (4-8 mg) by mouth nightly as needed 60 tablet 1     tiZANidine (ZANAFLEX) 4 MG tablet Take 1-2 tablets (4-8 mg) by mouth nightly as needed 30 tablet 1     UNKNOWN MED DOSAGE digestive advantage for lactose intolorance - 1 tab daily       VITAMIN D, CHOLECALCIFEROL, PO Take by mouth daily         Allergies   Allergen Reactions     Erythromycin Rash     Penicillins Rash     Sulfa Drugs Rash       Family History   Problem Relation Age of Onset     Diabetes Mother         Type 2     Arthritis Mother      Depression Mother         anxiety/panic attacks as well     Obesity Mother      Thyroid Disease Mother         dx ~35yrs     Hypertension Mother      Hyperlipidemia Mother      C.A.D. Father         MI at age 54     Gastrointestinal Disease Father         Ulcer H-Pylori?     Lipids Father      Hypertension Father      Hyperlipidemia Father      Genetic Disorder Father      Arthritis Maternal Grandmother      C.A.D. Maternal Grandfather         Artherosclerosis- cause of death, unsure if previous MI     Diabetes Maternal Grandfather         Type 2     Eye Disorder Maternal Grandfather          Glaucoma     Arthritis Sister      Depression Sister      Neurologic Disorder Sister         Migraines     Lipids Sister      Cerebrovascular Disease No family hx of      Breast Cancer No family hx of         Mother had biopsy 2016, found non-malignant     Cancer - colorectal No family hx of      Social History     Socioeconomic History     Marital status:      Spouse name: Maddie Renee     Number of children: 0     Years of education: Ph.D     Highest education level: None   Occupational History     Occupation:      Employer: [email or web address]     Comment: Sociology   Social Needs     Financial resource strain: None     Food insecurity     Worry: None     Inability: None     Transportation needs     Medical: None     Non-medical: None   Tobacco Use     Smoking status: Never Smoker     Smokeless tobacco: Never Used   Substance and Sexual Activity     Alcohol use: Yes     Alcohol/week: 0.0 standard drinks     Comment: Occas.     Drug use: No     Sexual activity: Yes     Partners: Female     Birth control/protection: None     Comment: stable relationship   Lifestyle     Physical activity     Days per week: None     Minutes per session: None     Stress: None   Relationships     Social connections     Talks on phone: None     Gets together: None     Attends Gnosticism service: None     Active member of club or organization: None     Attends meetings of clubs or organizations: None     Relationship status: None     Intimate partner violence     Fear of current or ex partner: None     Emotionally abused: None     Physically abused: None     Forced sexual activity: None   Other Topics Concern     Parent/sibling w/ CABG, MI or angioplasty before 65F 55M? Yes   Social History Narrative    Social Documentation:3/19/10        Balanced Diet: YES    Calcium intake: more than 2 per day    Caffeine: 1-2 cups per day    Exercise:  type of activity gym;  4 times per week    Sunscreen: Yes    Seatbelts:  Yes     Self Breast Exam:  No    Self Testicular Exam: n/a    Physical/Emotional/Sexual Abuse: No    Do you feel safe in your environment? Yes        Cholesterol screen up to date: Lab Test   3/4/09    2/2/07                  0934      0911         CHOL       194       179          HDL        36*       51           LDL        107       103          TRIG       256*      124          CHOLHDLRA* 5.5*      3.5              Eye Exam up to date: No    Dental Exam up to date: Yes    Pap smear up to date: PAP      NIL   3/4/2009    Mammogram up to date: Does Not Apply    Dexa Scan up to date: Does Not Apply    Colonoscopy up to date: Does Not Apply    Immunizations up to date: Yes last td 2005    Glucose screen if over 40:  Yes        Kacy Mccabe CMA                            Additional medical/Social/Surgical histories reviewed in [facility] and updated as appropriate.     REVIEW OF SYSTEMS (5/15/2020)  10 point ROS of systems including Constitutional, Eyes, Respiratory, Cardiovascular, Gastroenterology, Genitourinary, Integumentary, Musculoskeletal, Psychiatric, Allergic/Immunologic were all negative except for pertinent positives noted in my HPI.     PHYSICAL EXAM    General  - normal appearance, in no obvious distress  HEENT  - conjunctivae not injected, moist mucous membranes, normocephalic/atraumatic head, ears normal appearance, no lesions, mouth normal appearance, no scars, normal dentition and teeth present  CV  - normal peripheral perfusion  Pulm  - normal respiratory pattern, non-labored  Musculoskeletal - lumbar spine  - stance: normal gait without limp, no obvious leg length discrepancy, normal heel and toe walk  - inspection: normal bone and joint alignment, no obvious scoliosis  - ROM: flexion exacerbates pain, normal extension, sidebending, rotation    Neuro   grossly normal coordination, normal muscle tone  Skin  - no ecchymosis, erythema, warmth, or induration, no obvious rash  Psych  - interactive, appropriate,  normal mood and affect  ASSESSMENT & PLAN  44 yo female with lumbar disc herniation, radicular pain, not improving  Reviewed lumbar MRI : shows herniated discs and facet arthropathy at L4 and L5 nerve roots  Ordered KENNEDY  As she has not responded to improvement with oral medications and HEP  F/u after KENNEDY    Lucien Perez MD, CAQSM      Video-Visit Details    Type of service:  Video Visit    Video Start Time: 2:40pm  Video End Time:2:55pm    Originating Location (pt. Location): Home    Distant Location (provider location):  [facility]     Platform used for Video Visit: [facility]    Lucien Perez MD

## 2020-05-15 NOTE — TELEPHONE ENCOUNTER
Writer called and notified pt that the orders for imaging were faxed to [facility]. Pt will call to schedule an apt.     Federica Tracey LPN

## 2020-05-18 ENCOUNTER — E-VISIT (OUTPATIENT)
Dept: FAMILY MEDICINE | Facility: CLINIC | Age: 45
End: 2020-05-18
Payer: COMMERCIAL

## 2020-05-18 DIAGNOSIS — G47.00 INSOMNIA, UNSPECIFIED TYPE: ICD-10-CM

## 2020-05-18 DIAGNOSIS — M51.26 LUMBAR DISC HERNIATION: ICD-10-CM

## 2020-05-18 PROCEDURE — 99422 OL DIG E/M SVC 11-20 MIN: CPT | Performed by: FAMILY MEDICINE

## 2020-05-26 ENCOUNTER — TRANSFERRED RECORDS (OUTPATIENT)
Dept: HEALTH INFORMATION MANAGEMENT | Facility: CLINIC | Age: 45
End: 2020-05-26

## 2020-06-16 ENCOUNTER — VIRTUAL VISIT (OUTPATIENT)
Dept: ORTHOPEDICS | Facility: CLINIC | Age: 45
End: 2020-06-16
Payer: COMMERCIAL

## 2020-06-16 DIAGNOSIS — M54.16 LUMBAR RADICULOPATHY: Primary | ICD-10-CM

## 2020-06-16 NOTE — PROGRESS NOTES
"Marjorie Wolf is a 45 year old female who is being evaluated via a billable video visit.      The patient has been notified of following:     \"This video visit will be conducted via a call between you and your physician/provider. We have found that certain health care needs can be provided without the need for an in-person physical exam.  This service lets us provide the care you need with a video conversation.  If a prescription is necessary we can send it directly to your pharmacy.  If lab work is needed we can place an order for that and you can then stop by our lab to have the test done at a later time.    Video visits are billed at different rates depending on your insurance coverage.  Please reach out to your insurance provider with any questions.    If during the course of the call the physician/provider feels a video visit is not appropriate, you will not be charged for this service.\"    Patient has given verbal consent for Video visit? Yes    Will anyone else be joining your video visit? No    HISTORY OF PRESENT ILLNESS  Ms. Wolf is a pleasant 45 year old year old female who presents to followup for her lumbar KENNEDY  Marjorie explains that she is doing much better since injection  Location: low back and legs  Quality:  achy pain    Severity: 2/10 at worst    Duration: 6 months  Timing: occurs intermittently  Context: occurs while exercising and lifting  Modifying factors:  resting and non-use makes it better, movement and use makes it worse  Associated signs & symptoms: radiation into her legs  Additional history: as documented    MEDICAL HISTORY  Patient Active Problem List   Diagnosis     Allergic rhinitis due to pollen     Family history of other cardiovascular diseases     Low back pain     Hypertriglyceridemia     CARDIOVASCULAR SCREENING; LDL GOAL LESS THAN 160     Intermittent asthma     Insomnia     SVT (supraventricular tachycardia) (H)     Family history of hemochromatosis     Hashimoto's " thyroiditis     Thyroid enlargement     Thyroid nodule     ACP (advance care planning)       Current Outpatient Medications   Medication Sig Dispense Refill     amitriptyline (ELAVIL) 25 MG tablet TAKE 3 TABLETS BY MOUTH NIGHTLY AS NEEDED FOR SLEEP 270 tablet 0     Lactase (LACTAID PO)        levalbuterol (XOPENEX HFA) 45 MCG/ACT inhaler Inhale 2 puffs into the lungs every 4 hours as needed for shortness of breath / dyspnea or wheezing 15 g 2     MAGNESIUM PO        tiZANidine (ZANAFLEX) 4 MG tablet Take 1-2 tablets (4-8 mg) by mouth nightly as needed for muscle spasms 180 tablet 0     tiZANidine (ZANAFLEX) 4 MG tablet Take 1-2 tablets (4-8 mg) by mouth nightly as needed 30 tablet 1     UNKNOWN MED DOSAGE digestive advantage for lactose intolorance - 1 tab daily       VITAMIN D, CHOLECALCIFEROL, PO Take by mouth daily         Allergies   Allergen Reactions     Erythromycin Rash     Penicillins Rash     Sulfa Drugs Rash       Family History   Problem Relation Age of Onset     Diabetes Mother         Type 2     Arthritis Mother      Depression Mother         anxiety/panic attacks as well     Obesity Mother      Thyroid Disease Mother         dx ~35yrs     Hypertension Mother      Hyperlipidemia Mother      C.A.D. Father         MI at age 54     Gastrointestinal Disease Father         Ulcer H-Pylori?     Lipids Father      Hypertension Father      Hyperlipidemia Father      Genetic Disorder Father      Arthritis Maternal Grandmother      C.A.D. Maternal Grandfather         Artherosclerosis- cause of death, unsure if previous MI     Diabetes Maternal Grandfather         Type 2     Eye Disorder Maternal Grandfather         Glaucoma     Arthritis Sister      Depression Sister      Neurologic Disorder Sister         Migraines     Lipids Sister      Cerebrovascular Disease No family hx of      Breast Cancer No family hx of         Mother had biopsy 2016, found non-malignant     Cancer - colorectal No family hx of       Social History     Socioeconomic History     Marital status:      Spouse name: Maddie Renee     Number of children: 0     Years of education: Ph.D     Highest education level: None   Occupational History     Occupation:      Employer: [employer]     Comment: Sociology   Social Needs     Financial resource strain: None     Food insecurity     Worry: None     Inability: None     Transportation needs     Medical: None     Non-medical: None   Tobacco Use     Smoking status: Never Smoker     Smokeless tobacco: Never Used   Substance and Sexual Activity     Alcohol use: Yes     Alcohol/week: 0.0 standard drinks     Comment: Occas.     Drug use: No     Sexual activity: Yes     Partners: Female     Birth control/protection: None     Comment: stable relationship   Lifestyle     Physical activity     Days per week: None     Minutes per session: None     Stress: None   Relationships     Social connections     Talks on phone: None     Gets together: None     Attends Tenriism service: None     Active member of club or organization: None     Attends meetings of clubs or organizations: None     Relationship status: None     Intimate partner violence     Fear of current or ex partner: None     Emotionally abused: None     Physically abused: None     Forced sexual activity: None   Other Topics Concern     Parent/sibling w/ CABG, MI or angioplasty before 65F 55M? Yes   Social History Narrative    Social Documentation:3/19/10        Balanced Diet: YES    Calcium intake: more than 2 per day    Caffeine: 1-2 cups per day    Exercise:  type of activity gym;  4 times per week    Sunscreen: Yes    Seatbelts:  Yes    Self Breast Exam:  No    Self Testicular Exam: n/a    Physical/Emotional/Sexual Abuse: No    Do you feel safe in your environment? Yes        Cholesterol screen up to date: Lab Test   3/4/09    2/2/07                  0934      0911         CHOL       194       179          HDL        36*        51           LDL        107       103          TRIG       256*      124          CHOLHDLRA* 5.5*      3.5              Eye Exam up to date: No    Dental Exam up to date: Yes    Pap smear up to date: PAP      NIL   3/4/2009    Mammogram up to date: Does Not Apply    Dexa Scan up to date: Does Not Apply    Colonoscopy up to date: Does Not Apply    Immunizations up to date: Yes last td 2005    Glucose screen if over 40:  Yes        Kacy Ozunakelly MEI                            Additional medical/Social/Surgical histories reviewed in [facility] and updated as appropriate.     REVIEW OF SYSTEMS (6/18/2020)  10 point ROS of systems including Constitutional, Eyes, Respiratory, Cardiovascular, Gastroenterology, Genitourinary, Integumentary, Musculoskeletal, Psychiatric, Allergic/Immunologic were all negative except for pertinent positives noted in my HPI.     PHYSICAL EXAM    General  - normal appearance, in no obvious distress  HEENT  - conjunctivae not injected, moist mucous membranes, normocephalic/atraumatic head, ears normal appearance, no lesions, mouth normal appearance, no scars, normal dentition and teeth present  CV  - normal peripheral perfusion  Pulm  - normal respiratory pattern, non-labored  Musculoskeletal - lumbar spine  - stance: normal gait without limp, no obvious leg length discrepancy, normal heel and toe walk  - inspection: normal bone and joint alignment, no obvious scoliosis    - ROM: flexion exacerbates some pain, normal extension, sidebending, rotation    Neuro   grossly normal coordination, normal muscle tone  Skin  - no ecchymosis, erythema, warmth, or induration, no obvious rash  Psych  - interactive, appropriate, normal mood and affect  ASSESSMENT & PLAN  46 yo female with lumbar disc herniation, radicular pain, improved  Reviewed her lumbar KENNEDY which seems to be working  Consider repeating in July  Will f/u with her in July  Lucien Perez MD, CAQSM      Video-Visit Details    Type of service:   Video Visit    Video Start Time: 2:42 PM  Video End Time: 2:54 PM    Originating Location (pt. Location): Home    Distant Location (provider location):  [facility]     Platform used for Video Visit: Nolan Perez MD

## 2020-07-07 ENCOUNTER — HOSPITAL ENCOUNTER (OUTPATIENT)
Facility: CLINIC | Age: 45
End: 2020-07-07
Payer: COMMERCIAL

## 2020-07-07 ENCOUNTER — OFFICE VISIT (OUTPATIENT)
Dept: ORTHOPEDICS | Facility: CLINIC | Age: 45
End: 2020-07-07
Payer: COMMERCIAL

## 2020-07-07 VITALS
DIASTOLIC BLOOD PRESSURE: 75 MMHG | SYSTOLIC BLOOD PRESSURE: 123 MMHG | HEIGHT: 71 IN | WEIGHT: 169 LBS | HEART RATE: 80 BPM | BODY MASS INDEX: 23.66 KG/M2

## 2020-07-07 DIAGNOSIS — Z11.59 ENCOUNTER FOR SCREENING FOR OTHER VIRAL DISEASES: Primary | ICD-10-CM

## 2020-07-07 DIAGNOSIS — M51.16 LUMBAR DISC HERNIATION WITH RADICULOPATHY: ICD-10-CM

## 2020-07-07 DIAGNOSIS — M54.40 BILATERAL LOW BACK PAIN WITH SCIATICA, SCIATICA LATERALITY UNSPECIFIED, UNSPECIFIED CHRONICITY: Primary | ICD-10-CM

## 2020-07-07 PROCEDURE — 99214 OFFICE O/P EST MOD 30 MIN: CPT | Performed by: PREVENTIVE MEDICINE

## 2020-07-07 RX ORDER — GABAPENTIN 100 MG/1
100-200 CAPSULE ORAL AT BEDTIME
Qty: 60 CAPSULE | Refills: 1 | Status: SHIPPED | OUTPATIENT
Start: 2020-07-07 | End: 2020-09-23

## 2020-07-07 ASSESSMENT — PAIN SCALES - GENERAL: PAINLEVEL: MODERATE PAIN (5)

## 2020-07-07 ASSESSMENT — MIFFLIN-ST. JEOR: SCORE: 1499.77

## 2020-07-07 NOTE — PATIENT INSTRUCTIONS
Thanks for coming today.  [facility]  [address] [place] [address]    To schedule future appointments in Ortho Clinic, you may call [phone].    To schedule ordered imaging by your provider:   Call [facility] [phone]    To schedule an injection ordered by your provider:  Call [facility] Injection scheduling line: [phone]  [employer] available online at:  [email or web address]    Please call if any further questions or concerns ([phone]).  Clinic hours 8 am to 5 pm.    Return to clinic (call) if symptoms worsen or fail to improve.

## 2020-07-07 NOTE — PROGRESS NOTES
HISTORY OF PRESENT ILLNESS  Ms. Wolf is a pleasant 45 year old year old female who presents to clinic today with low back discomfort x 6 months  Lumbar spasms  Had KENNEDY about 6 weeks ago  Which has improved the level of pain in her lumbar spasms, has not improved the symptoms in her lateral legs and low back when she tries to sleep    Location: low back  Quality:  achy pain    Severity: 6/10 at worst    Duration: 6 months  Timing: occurs intermittently  Context: occurs while sitting and lying flat  Modifying factors:  resting and non-use makes it better, movement and use makes it worse  Associated signs & symptoms: radiation into hips and legs    MEDICAL HISTORY  Patient Active Problem List   Diagnosis     Allergic rhinitis due to pollen     Family history of other cardiovascular diseases     Low back pain     Hypertriglyceridemia     CARDIOVASCULAR SCREENING; LDL GOAL LESS THAN 160     Intermittent asthma     Insomnia     SVT (supraventricular tachycardia) (H)     Family history of hemochromatosis     Hashimoto's thyroiditis     Thyroid enlargement     Thyroid nodule     ACP (advance care planning)       Current Outpatient Medications   Medication Sig Dispense Refill     amitriptyline (ELAVIL) 25 MG tablet TAKE 3 TABLETS BY MOUTH NIGHTLY AS NEEDED FOR SLEEP 270 tablet 0     Lactase (LACTAID PO)        levalbuterol (XOPENEX HFA) 45 MCG/ACT inhaler Inhale 2 puffs into the lungs every 4 hours as needed for shortness of breath / dyspnea or wheezing 15 g 2     MAGNESIUM PO        tiZANidine (ZANAFLEX) 4 MG tablet Take 1-2 tablets (4-8 mg) by mouth nightly as needed for muscle spasms 180 tablet 0     tiZANidine (ZANAFLEX) 4 MG tablet Take 1-2 tablets (4-8 mg) by mouth nightly as needed 30 tablet 1     UNKNOWN MED DOSAGE digestive advantage for lactose intolorance - 1 tab daily       VITAMIN D, CHOLECALCIFEROL, PO Take by mouth daily         Allergies   Allergen Reactions     Erythromycin Rash     Penicillins Rash      Sulfa Drugs Rash       Family History   Problem Relation Age of Onset     Diabetes Mother         Type 2     Arthritis Mother      Depression Mother         anxiety/panic attacks as well     Obesity Mother      Thyroid Disease Mother         dx ~35yrs     Hypertension Mother      Hyperlipidemia Mother      C.A.D. Father         MI at age 54     Gastrointestinal Disease Father         Ulcer H-Pylori?     Lipids Father      Hypertension Father      Hyperlipidemia Father      Genetic Disorder Father      Arthritis Maternal Grandmother      C.A.D. Maternal Grandfather         Artherosclerosis- cause of death, unsure if previous MI     Diabetes Maternal Grandfather         Type 2     Eye Disorder Maternal Grandfather         Glaucoma     Arthritis Sister      Depression Sister      Neurologic Disorder Sister         Migraines     Lipids Sister      Cerebrovascular Disease No family hx of      Breast Cancer No family hx of         Mother had biopsy 2016, found non-malignant     Cancer - colorectal No family hx of      Social History     Socioeconomic History     Marital status:      Spouse name: Maddie Renee     Number of children: 0     Years of education: Ph.D     Highest education level: Not on file   Occupational History     Occupation:      Employer: [employer]     Comment: Sociology   Social Needs     Financial resource strain: Not on file     Food insecurity     Worry: Not on file     Inability: Not on file     Transportation needs     Medical: Not on file     Non-medical: Not on file   Tobacco Use     Smoking status: Never Smoker     Smokeless tobacco: Never Used   Substance and Sexual Activity     Alcohol use: Yes     Alcohol/week: 0.0 standard drinks     Comment: Occas.     Drug use: No     Sexual activity: Yes     Partners: Female     Birth control/protection: None     Comment: stable relationship   Lifestyle     Physical activity     Days per week: Not on file     Minutes per  session: Not on file     Stress: Not on file   Relationships     Social connections     Talks on phone: Not on file     Gets together: Not on file     Attends [employer] service: Not on file     Active member of club or organization: Not on file     Attends meetings of clubs or organizations: Not on file     Relationship status: Not on file     Intimate partner violence     Fear of current or ex partner: Not on file     Emotionally abused: Not on file     Physically abused: Not on file     Forced sexual activity: Not on file   Other Topics Concern     Parent/sibling w/ CABG, MI or angioplasty before 65F 55M? Yes   Social History Narrative    Social Documentation:3/19/10        Balanced Diet: YES    Calcium intake: more than 2 per day    Caffeine: 1-2 cups per day    Exercise:  type of activity gym;  4 times per week    Sunscreen: Yes    Seatbelts:  Yes    Self Breast Exam:  No    Self Testicular Exam: n/a    Physical/Emotional/Sexual Abuse: No    Do you feel safe in your environment? Yes        Cholesterol screen up to date: Lab Test   3/4/09    2/2/07                  0934      0911         CHOL       194       179          HDL        36*       51           LDL        107       103          TRIG       256*      124          CHOLHDLRA* 5.5*      3.5              Eye Exam up to date: No    Dental Exam up to date: Yes    Pap smear up to date: PAP      NIL   3/4/2009    Mammogram up to date: Does Not Apply    Dexa Scan up to date: Does Not Apply    Colonoscopy up to date: Does Not Apply    Immunizations up to date: Yes last td 2005    Glucose screen if over 40:  Yes        Kacy Mccabe CMA                            Additional medical/Social/Surgical histories reviewed in [facility] and updated as appropriate.     REVIEW OF SYSTEMS (7/7/2020)  10 point ROS of systems including Constitutional, Eyes, Respiratory, Cardiovascular, Gastroenterology, Genitourinary, Integumentary, Musculoskeletal, Psychiatric,  "Allergic/Immunologic were all negative except for pertinent positives noted in my HPI.     PHYSICAL EXAM  Vitals:    07/07/20 0905   BP: 123/75   Pulse: 80   Weight: 76.7 kg (169 lb)   Height: 1.791 m (5' 10.5\")     Vital Signs: /75   Pulse 80   Ht 1.791 m (5' 10.5\")   Wt 76.7 kg (169 lb)   BMI 23.91 kg/m   Patient declined being weighed. Body mass index is 23.91 kg/m .    General  - normal appearance, in no obvious distress  HEENT  - conjunctivae not injected, moist mucous membranes, normocephalic/atraumatic head, ears normal appearance, no lesions, mouth normal appearance, no scars, normal dentition and teeth present  CV  - normal peripheral perfusion  Pulm  - normal respiratory pattern, non-labored  Musculoskeletal - lumbar spine  - stance: normal gait without limp, no obvious leg length discrepancy, normal heel and toe walk  - inspection: normal bone and joint alignment, no obvious scoliosis  - palpation: no paravertebral or bony tenderness  - ROM: flexion exacerbates pain, normal extension, sidebending, rotation  - strength: lower extremities 5/5 in all planes  - special tests:  (+) straight leg raise- low back pain  (+) slump test  Neuro  - patellar and Achilles DTRs 2+ bilaterally, NO lower extremity sensory deficit throughout L5 distribution, grossly normal coordination, normal muscle tone  Skin  - no ecchymosis, erythema, warmth, or induration, no obvious rash  Psych  - interactive, appropriate, normal mood and affect  ASSESSMENT & PLAN  46 yo female with lumbar ddd, radicular pain, not resolved  Reviewed lumbar MRI: shows ddd  Ordered lumbar Eyal for L3/4  Start PT  Cont. Medications as written, added gabapentin nightly  F/u after EYAL      Lucien Perez MD, CAQSM    "

## 2020-07-07 NOTE — PROGRESS NOTES
"      [facility] FOLLOW-UP VISIT 7/7/2020    Marjorie Wolf's chief complaint for this visit includes:  Chief Complaint   Patient presents with     Back Pain     Low back follow up     PCP: Jeanette Rodrigues    Referring Provider:  Jeanette Rodrigues DO  [address] [facility]  [address]  [place] [address]    /75   Pulse 80   Ht 1.791 m (5' 10.5\")   Wt 76.7 kg (169 lb)   BMI 23.91 kg/m    Moderate Pain (5)       Interval History:     Follow up reason: Follow up for low back pain     Date of injury:No    Date last seen: Virtual visit 6/16    Following Therapeutic Plan: No     Pain: Unchanged    Function: Unchanged    Interval History:      Medical History:    Any recent changes to your medical history? No    Any new medication prescribed since last visit? No    Review of Systems:    Do you have fever, chills, weight loss? No    Do you have any vision problems? No    Do you have any chest pain or edema? No    Do you have any shortness of breath or wheezing?  No    Do you have stomach problems? No    Do you have any urinary track issues? No    Do you have any numbness or focal weakness? No    Do you have diabetes? No    Do you have problems with bleeding or clotting? No    Do you have an rashes or other skin lesions? No    "

## 2020-07-07 NOTE — LETTER
"    7/7/2020         RE: Marjorie Wolf  [address] [place] [facility] [address]        Dear Colleague,    Thank you for referring your patient, Marjorie Wolf, to the [facility]. Please see a copy of my visit note below.          [facility] FOLLOW-UP VISIT 7/7/2020    Marjorie Wolf's chief complaint for this visit includes:  Chief Complaint   Patient presents with     Back Pain     Low back follow up     PCP: Jeanette Rodrigues    Referring Provider:  Jeanette Rodrigues DO  [address]  [address]  [place] [address]    /75   Pulse 80   Ht 1.791 m (5' 10.5\")   Wt 76.7 kg (169 lb)   BMI 23.91 kg/m    Moderate Pain (5)       Interval History:     Follow up reason: Follow up for low back pain     Date of injury:No    Date last seen: Virtual visit 6/16    Following Therapeutic Plan: No     Pain: Unchanged    Function: Unchanged    Interval History:      Medical History:    Any recent changes to your medical history? No    Any new medication prescribed since last visit? No    Review of Systems:    Do you have fever, chills, weight loss? No    Do you have any vision problems? No    Do you have any chest pain or edema? No    Do you have any shortness of breath or wheezing?  No    Do you have stomach problems? No    Do you have any urinary track issues? No    Do you have any numbness or focal weakness? No    Do you have diabetes? No    Do you have problems with bleeding or clotting? No    Do you have an rashes or other skin lesions? No      HISTORY OF PRESENT ILLNESS  Ms. Wolf is a pleasant 45 year old year old female who presents to clinic today with low back discomfort x 6 months  Lumbar spasms  Had KENNEDY about 6 weeks ago  Which has improved the level of pain in her lumbar spasms, has not improved the symptoms in her lateral legs and low back when she tries to sleep    Location: low back  Quality:  achy pain    Severity: 6/10 at worst    Duration: 6 months  Timing: " occurs intermittently  Context: occurs while sitting and lying flat  Modifying factors:  resting and non-use makes it better, movement and use makes it worse  Associated signs & symptoms: radiation into hips and legs    MEDICAL HISTORY  Patient Active Problem List   Diagnosis     Allergic rhinitis due to pollen     Family history of other cardiovascular diseases     Low back pain     Hypertriglyceridemia     CARDIOVASCULAR SCREENING; LDL GOAL LESS THAN 160     Intermittent asthma     Insomnia     SVT (supraventricular tachycardia) (H)     Family history of hemochromatosis     Hashimoto's thyroiditis     Thyroid enlargement     Thyroid nodule     ACP (advance care planning)       Current Outpatient Medications   Medication Sig Dispense Refill     amitriptyline (ELAVIL) 25 MG tablet TAKE 3 TABLETS BY MOUTH NIGHTLY AS NEEDED FOR SLEEP 270 tablet 0     Lactase (LACTAID PO)        levalbuterol (XOPENEX HFA) 45 MCG/ACT inhaler Inhale 2 puffs into the lungs every 4 hours as needed for shortness of breath / dyspnea or wheezing 15 g 2     MAGNESIUM PO        tiZANidine (ZANAFLEX) 4 MG tablet Take 1-2 tablets (4-8 mg) by mouth nightly as needed for muscle spasms 180 tablet 0     tiZANidine (ZANAFLEX) 4 MG tablet Take 1-2 tablets (4-8 mg) by mouth nightly as needed 30 tablet 1     UNKNOWN MED DOSAGE digestive advantage for lactose intolorance - 1 tab daily       VITAMIN D, CHOLECALCIFEROL, PO Take by mouth daily         Allergies   Allergen Reactions     Erythromycin Rash     Penicillins Rash     Sulfa Drugs Rash       Family History   Problem Relation Age of Onset     Diabetes Mother         Type 2     Arthritis Mother      Depression Mother         anxiety/panic attacks as well     Obesity Mother      Thyroid Disease Mother         dx ~35yrs     Hypertension Mother      Hyperlipidemia Mother      C.A.D. Father         MI at age 54     Gastrointestinal Disease Father         Ulcer H-Pylori?     Lipids Father      Hypertension  Father      Hyperlipidemia Father      Genetic Disorder Father      Arthritis Maternal Grandmother      C.A.D. Maternal Grandfather         Artherosclerosis- cause of death, unsure if previous MI     Diabetes Maternal Grandfather         Type 2     Eye Disorder Maternal Grandfather         Glaucoma     Arthritis Sister      Depression Sister      Neurologic Disorder Sister         Migraines     Lipids Sister      Cerebrovascular Disease No family hx of      Breast Cancer No family hx of         Mother had biopsy 2016, found non-malignant     Cancer - colorectal No family hx of      Social History     Socioeconomic History     Marital status:      Spouse name: Maddie Renee     Number of children: 0     Years of education: Ph.D     Highest education level: Not on file   Occupational History     Occupation:      Employer: [employer]     Comment: Sociology   Social Needs     Financial resource strain: Not on file     Food insecurity     Worry: Not on file     Inability: Not on file     Transportation needs     Medical: Not on file     Non-medical: Not on file   Tobacco Use     Smoking status: Never Smoker     Smokeless tobacco: Never Used   Substance and Sexual Activity     Alcohol use: Yes     Alcohol/week: 0.0 standard drinks     Comment: Occas.     Drug use: No     Sexual activity: Yes     Partners: Female     Birth control/protection: None     Comment: stable relationship   Lifestyle     Physical activity     Days per week: Not on file     Minutes per session: Not on file     Stress: Not on file   Relationships     Social connections     Talks on phone: Not on file     Gets together: Not on file     Attends Muslim service: Not on file     Active member of club or organization: Not on file     Attends meetings of clubs or organizations: Not on file     Relationship status: Not on file     Intimate partner violence     Fear of current or ex partner: Not on file     Emotionally abused:  "Not on file     Physically abused: Not on file     Forced sexual activity: Not on file   Other Topics Concern     Parent/sibling w/ CABG, MI or angioplasty before 65F 55M? Yes   Social History Narrative    Social Documentation:3/19/10        Balanced Diet: YES    Calcium intake: more than 2 per day    Caffeine: 1-2 cups per day    Exercise:  type of activity gym;  4 times per week    Sunscreen: Yes    Seatbelts:  Yes    Self Breast Exam:  No    Self Testicular Exam: n/a    Physical/Emotional/Sexual Abuse: No    Do you feel safe in your environment? Yes        Cholesterol screen up to date: Lab Test   3/4/09    2/2/07                  0934      0911         CHOL       194       179          HDL        36*       51           LDL        107       103          TRIG       256*      124          CHOLHDLRA* 5.5*      3.5              Eye Exam up to date: No    Dental Exam up to date: Yes    Pap smear up to date: PAP      NIL   3/4/2009    Mammogram up to date: Does Not Apply    Dexa Scan up to date: Does Not Apply    Colonoscopy up to date: Does Not Apply    Immunizations up to date: Yes last td 2005    Glucose screen if over 40:  Yes        Kacy Mccabe CMA                            Additional medical/Social/Surgical histories reviewed in [facility] and updated as appropriate.     REVIEW OF SYSTEMS (7/7/2020)  10 point ROS of systems including Constitutional, Eyes, Respiratory, Cardiovascular, Gastroenterology, Genitourinary, Integumentary, Musculoskeletal, Psychiatric, Allergic/Immunologic were all negative except for pertinent positives noted in my HPI.     PHYSICAL EXAM  Vitals:    07/07/20 0905   BP: 123/75   Pulse: 80   Weight: 76.7 kg (169 lb)   Height: 1.791 m (5' 10.5\")     Vital Signs: /75   Pulse 80   Ht 1.791 m (5' 10.5\")   Wt 76.7 kg (169 lb)   BMI 23.91 kg/m   Patient declined being weighed. Body mass index is 23.91 kg/m .    General  - normal appearance, in no obvious distress  HEENT  - conjunctivae " not injected, moist mucous membranes, normocephalic/atraumatic head, ears normal appearance, no lesions, mouth normal appearance, no scars, normal dentition and teeth present  CV  - normal peripheral perfusion  Pulm  - normal respiratory pattern, non-labored  Musculoskeletal - lumbar spine  - stance: normal gait without limp, no obvious leg length discrepancy, normal heel and toe walk  - inspection: normal bone and joint alignment, no obvious scoliosis  - palpation: no paravertebral or bony tenderness  - ROM: flexion exacerbates pain, normal extension, sidebending, rotation  - strength: lower extremities 5/5 in all planes  - special tests:  (+) straight leg raise- low back pain  (+) slump test  Neuro  - patellar and Achilles DTRs 2+ bilaterally, NO lower extremity sensory deficit throughout L5 distribution, grossly normal coordination, normal muscle tone  Skin  - no ecchymosis, erythema, warmth, or induration, no obvious rash  Psych  - interactive, appropriate, normal mood and affect  ASSESSMENT & PLAN  46 yo female with lumbar ddd, radicular pain, not resolved  Reviewed lumbar MRI: shows ddd  Ordered lumbar Eyal for L3/4  Start PT  Cont. Medications as written, added gabapentin nightly  F/u after EYAL      Lucien Perez MD, CAQSM      Again, thank you for allowing me to participate in the care of your patient.        Sincerely,        Lucien Perez MD

## 2020-07-16 ENCOUNTER — TRANSFERRED RECORDS (OUTPATIENT)
Dept: HEALTH INFORMATION MANAGEMENT | Facility: CLINIC | Age: 45
End: 2020-07-16

## 2020-08-11 ENCOUNTER — THERAPY VISIT (OUTPATIENT)
Dept: PHYSICAL THERAPY | Facility: CLINIC | Age: 45
End: 2020-08-11
Payer: COMMERCIAL

## 2020-08-11 DIAGNOSIS — M51.16 LUMBAR DISC HERNIATION WITH RADICULOPATHY: ICD-10-CM

## 2020-08-11 DIAGNOSIS — M54.50 CHRONIC BILATERAL LOW BACK PAIN WITHOUT SCIATICA: Primary | ICD-10-CM

## 2020-08-11 DIAGNOSIS — M54.40 BILATERAL LOW BACK PAIN WITH SCIATICA, SCIATICA LATERALITY UNSPECIFIED, UNSPECIFIED CHRONICITY: ICD-10-CM

## 2020-08-11 DIAGNOSIS — G89.29 CHRONIC BILATERAL LOW BACK PAIN WITHOUT SCIATICA: Primary | ICD-10-CM

## 2020-08-11 PROCEDURE — 97110 THERAPEUTIC EXERCISES: CPT | Mod: GP | Performed by: PHYSICAL THERAPIST

## 2020-08-11 PROCEDURE — 97161 PT EVAL LOW COMPLEX 20 MIN: CPT | Mod: GP | Performed by: PHYSICAL THERAPIST

## 2020-08-11 PROCEDURE — 97530 THERAPEUTIC ACTIVITIES: CPT | Mod: GP | Performed by: PHYSICAL THERAPIST

## 2020-08-11 NOTE — PROGRESS NOTES
[facility] Initial Evaluation -- Lumbar    Date: August 11, 2020  Marjorie Wolf is a 45 year old female with a lumbar condition.   Referral: Dr. 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 Weight lifting/strength training (meets with  about every 7 weeks)  Functional disability score (ABE/STarT Back):  Not assessed  VAS score (0-10): 4/10; at best 1-2/10, at worst 6/10 (since last injection)  Patient goals/expectations:  Alleviate lower back pain so able to sleep on R side without spasms, resume working out without pain, running/cardio without pain    HISTORY:    Present symptoms: ache across the lower back, symmetrical.  Has not been experiencing lateral L thigh symptoms since KENNEDY on 6/24/2020.  Pain quality (sharp/shooting/stabbing/aching/burning/cramping):  ache   Paresthesia (yes/no):  no    Present since (onset date): worsening since the summer of 2019.  .     Symptoms (improving/unchanging/worsening):  Improving since KENNEDY, 6/24/2020..     Symptoms commenced as a result of: unknown reason for flare   Condition occurred in the following environment:   unknown     Symptoms at onset (back/thigh/leg): back and thigh  Constant symptoms (back/thigh/leg): back  Intermittent symptoms (back/thigh/leg): thigh (has not had thigh symptoms since June 24, 2020)    Symptoms are made worse with the following: Always Bending, Sometimes Sitting (30 minute tolerance), Always Rising, Sometimes Standing (10' tolerance), Sometimes Lying (sided) and Time of day - No effect   Symptoms are made better with the following: Always Walking and Always On the move (changing positions).  KENNEDY at L3-4 on 6/24/2020)  Disturbed sleep (yes/no):  Yes, 1-2 times a week   Sleeping postures (prone/sup/side R/L): supine or sides.  If wakes on R side will develop a spasm on the R  side     Previous episodes (0/1-5/6-10/11+): 11+ Year of first episode: 2000    Previous history: 2000 she fell down stairs and injured her lower back.  Has had symptoms ever since.  Symptoms began to progressively worsen in 2009.  Started injections in 2010 which alleviated the symptoms.  Symptoms returned off and on, then progressively worsening since summer 2019.  Previous treatments: KENNEDY.  She did try PT in 2009.      Specific Questions:  Cough/Sneeze/Strain (pos/neg): negative  Bowel/Bladder (normal/abnormal): crista  Gait (normal/abnormal): normal  Medications (nil/NSAIDS/analg/steroids/anticoag/other):  Muscle relaxants and Other - Tizanidine  Medical allergies:  none  General health (excellent/good/fair/poor):  good  Pertinent medical history:  None  Imaging (None/Xray/MRI/Other):  MRI Feb 2020 - bulging discs at L3-4, L4-5  Recent or major surgery (yes/no):  no  Night pain (yes/no): no  Accidents (yes/no): no  Unexplained weight loss (yes/no): no  Barriers at home: no  Other red flags: no    EXAMINATION    Posture:   Sitting (good/fair/poor): poor  Standing (good/fair/poor):fair  Lordosis (red/acc/normal): reduced  Correction of posture (better/worse/no effect): no effect but support felt good    Lateral Shift (right/left/nil): nil  Relevant (yes/no):  no  Other Observations: very flat back    Neurological:    Motor deficit:  Strong and painfree    Reflexes:  Symmetrical and brisk  Sensory deficit:  Symmetrical to light touch    Dural signs:  (+) well leg SLR at end range, (+) SLR on L with about 10 lacking full knee extension    Movement Loss:   [place] Mod Min Nil Pain   Flexion   x  No effect   Extension x x   Inc LBP   Side Gliding R  x x  Prod lateral L hip   Side Gliding L  x   Prod lateral R hip     Test Movements:   During: produces, abolishes, increases, decreases, no effect, centralizing, peripheralizing   After: better, worse, no better, no worse, no effect, centralized, peripheralized    Pretest  symptoms standing:not tested   Symptoms During Symptoms After ROM increased ROM decreased No Effect   FIS          Rep FIS          EIS          Rep EIS          Pretest symptoms lyin/10 lower back pain across low back    Symptoms During Symptoms After ROM increased ROM decreased No Effect   VIVIAN          Rep VIVIAN          EIL Increases  PDM    No Worse    - - -   Rep EIL Increases  PDM  Centralizing  Better    Inc EIS and L SGIS.  Less pain with LROM     If required, pretest symptoms: not tested   Symptoms During Symptoms After ROM increased ROM decreased No Effect   SGIS - R          Rep SGIS - R          SGIS - L          Rep SGIS - L            Static Tests:  Sitting slouched:  Not tested  Sitting erect:  Not tested  Standing slouched not tested  Standing erect:  Not tested  Lying prone in extension:  Increased symptoms on the L side of the back.  Tried shifting hips R which helped with the left lower back but then felt more symptoms on the R.  Dec L SGIS after    Long sitting:  Not tested    Other Tests: prone lying decreased pain, B    Provisional Classification:  Derangement - Asymmetrical, unilateral, symptoms above knee    Principle of Management:  Education:  Discussed and demo of proper posture and use of lumbar roll.  discussed setting baselines before exercise or activity that she is concerned about then reassess baselines afterwards.  If worse, the activity may be too much for where she is - Pt did seem to understand this concept and did think it would be wise to hold on strengthening temporarily.  Discussed centralization/peripheralization.     Equipment provided:  None; pt will use a rolled towel to support her lower back  Mechanical therapy (Y/N):  yes   Extension principle:  Prone lying up to 3' followed by EIL 10 reps, goal 6-8 times a day  Lateral Principle:  no  Flexion principle:  no  Other:  no    ASSESSMENT/PLAN:    Patient is a 45 year old female with lumbar complaints.    Patient has the  following significant findings with corresponding treatment plan.                Diagnosis 1:  Low back pain    Pain -  manual therapy, self management, education, directional preference exercise and home program  Decreased ROM/flexibility - manual therapy, therapeutic exercise and home program  Decreased joint mobility - manual therapy, therapeutic exercise and home program  Decreased function - therapeutic activities and home program  Impaired posture - neuro re-education, therapeutic activities and home program    Therapy Evaluation Codes:   1) History comprised of:   Personal factors that impact the plan of care:      None.    Comorbidity factors that impact the plan of care are:      None.     Medications impacting care: None.  2) Examination of Body Systems comprised of:   Body structures and functions that impact the plan of care:      Lumbar spine.   Activity limitations that impact the plan of care are:      Bending, Dressing, Sitting, Standing and Sleeping.  3) Clinical presentation characteristics are:   Stable/Uncomplicated.  Cumulative Therapy Evaluation is: Low complexity.    Previous and current functional limitations:  (See Goal Flow Sheet for this information)    Short term and Long term goals: (See Goal Flow Sheet for this information)     Communication ability:  Patient appears to be able to clearly communicate and understand verbal and written communication and follow directions correctly.  Treatment Explanation - The following has been discussed with the patient:   RX ordered/plan of care  Anticipated outcomes  Possible risks and side effects  This patient would benefit from PT intervention to resume normal activities.   Rehab potential is good.    Frequency:  1 X week, once daily  Duration:  for 12 visits  Discharge Plan:  Achieve all LTG.  Independent in home treatment program.  Reach maximal therapeutic benefit.    Please refer to the daily flowsheet for treatment today, total treatment time  and time spent performing 1:1 timed codes.

## 2020-08-13 ENCOUNTER — HOSPITAL ENCOUNTER (OUTPATIENT)
Dept: MAMMOGRAPHY | Facility: CLINIC | Age: 45
Discharge: HOME | End: 2020-08-13
Attending: FAMILY MEDICINE | Admitting: FAMILY MEDICINE
Payer: COMMERCIAL

## 2020-08-13 DIAGNOSIS — Z12.31 SCREENING MAMMOGRAM, ENCOUNTER FOR: ICD-10-CM

## 2020-08-13 PROCEDURE — 77067 SCR MAMMO BI INCL CAD: CPT

## 2020-08-18 ENCOUNTER — THERAPY VISIT (OUTPATIENT)
Dept: PHYSICAL THERAPY | Facility: CLINIC | Age: 45
End: 2020-08-18
Payer: COMMERCIAL

## 2020-08-18 DIAGNOSIS — M54.50 CHRONIC BILATERAL LOW BACK PAIN WITHOUT SCIATICA: ICD-10-CM

## 2020-08-18 DIAGNOSIS — G89.29 CHRONIC BILATERAL LOW BACK PAIN WITHOUT SCIATICA: ICD-10-CM

## 2020-08-18 PROCEDURE — 97530 THERAPEUTIC ACTIVITIES: CPT | Mod: GP | Performed by: PHYSICAL THERAPIST

## 2020-08-18 PROCEDURE — 97110 THERAPEUTIC EXERCISES: CPT | Mod: GP | Performed by: PHYSICAL THERAPIST

## 2020-08-19 DIAGNOSIS — M51.26 LUMBAR DISC HERNIATION: ICD-10-CM

## 2020-08-19 NOTE — TELEPHONE ENCOUNTER
Routing refill request to provider for review/approval because:  Drug not on the [facility] refill protocol   Katarzyna CENTENO RN

## 2020-08-25 ENCOUNTER — THERAPY VISIT (OUTPATIENT)
Dept: PHYSICAL THERAPY | Facility: CLINIC | Age: 45
End: 2020-08-25
Payer: COMMERCIAL

## 2020-08-25 DIAGNOSIS — G89.29 CHRONIC BILATERAL LOW BACK PAIN WITHOUT SCIATICA: ICD-10-CM

## 2020-08-25 DIAGNOSIS — M54.50 CHRONIC BILATERAL LOW BACK PAIN WITHOUT SCIATICA: ICD-10-CM

## 2020-08-25 PROCEDURE — 97110 THERAPEUTIC EXERCISES: CPT | Mod: GP | Performed by: PHYSICAL THERAPIST

## 2020-08-25 PROCEDURE — 97112 NEUROMUSCULAR REEDUCATION: CPT | Mod: GP | Performed by: PHYSICAL THERAPIST

## 2020-09-01 ENCOUNTER — THERAPY VISIT (OUTPATIENT)
Dept: PHYSICAL THERAPY | Facility: CLINIC | Age: 45
End: 2020-09-01
Payer: COMMERCIAL

## 2020-09-01 DIAGNOSIS — G89.29 CHRONIC BILATERAL LOW BACK PAIN WITHOUT SCIATICA: ICD-10-CM

## 2020-09-01 DIAGNOSIS — M54.50 CHRONIC BILATERAL LOW BACK PAIN WITHOUT SCIATICA: ICD-10-CM

## 2020-09-01 PROCEDURE — 97110 THERAPEUTIC EXERCISES: CPT | Mod: GP | Performed by: PHYSICAL THERAPIST

## 2020-09-01 PROCEDURE — 97112 NEUROMUSCULAR REEDUCATION: CPT | Mod: GP | Performed by: PHYSICAL THERAPIST

## 2020-09-09 ENCOUNTER — THERAPY VISIT (OUTPATIENT)
Dept: PHYSICAL THERAPY | Facility: CLINIC | Age: 45
End: 2020-09-09
Payer: COMMERCIAL

## 2020-09-09 DIAGNOSIS — G89.29 CHRONIC BILATERAL LOW BACK PAIN WITHOUT SCIATICA: ICD-10-CM

## 2020-09-09 DIAGNOSIS — M54.50 CHRONIC BILATERAL LOW BACK PAIN WITHOUT SCIATICA: ICD-10-CM

## 2020-09-09 PROCEDURE — 97110 THERAPEUTIC EXERCISES: CPT | Mod: GP | Performed by: PHYSICAL THERAPY ASSISTANT

## 2020-09-15 ENCOUNTER — THERAPY VISIT (OUTPATIENT)
Dept: PHYSICAL THERAPY | Facility: CLINIC | Age: 45
End: 2020-09-15
Payer: COMMERCIAL

## 2020-09-15 DIAGNOSIS — M54.50 CHRONIC BILATERAL LOW BACK PAIN WITHOUT SCIATICA: ICD-10-CM

## 2020-09-15 DIAGNOSIS — G89.29 CHRONIC BILATERAL LOW BACK PAIN WITHOUT SCIATICA: ICD-10-CM

## 2020-09-15 PROCEDURE — 97112 NEUROMUSCULAR REEDUCATION: CPT | Mod: GP | Performed by: PHYSICAL THERAPIST

## 2020-09-15 PROCEDURE — 97110 THERAPEUTIC EXERCISES: CPT | Mod: GP | Performed by: PHYSICAL THERAPIST

## 2020-09-23 ENCOUNTER — OFFICE VISIT (OUTPATIENT)
Dept: FAMILY MEDICINE | Facility: CLINIC | Age: 45
End: 2020-09-23
Payer: COMMERCIAL

## 2020-09-23 VITALS
WEIGHT: 168.9 LBS | HEIGHT: 71 IN | TEMPERATURE: 96.8 F | DIASTOLIC BLOOD PRESSURE: 82 MMHG | OXYGEN SATURATION: 97 % | HEART RATE: 79 BPM | SYSTOLIC BLOOD PRESSURE: 132 MMHG | BODY MASS INDEX: 23.65 KG/M2

## 2020-09-23 DIAGNOSIS — Z80.8 FAMILY HISTORY OF SKIN CANCER: ICD-10-CM

## 2020-09-23 DIAGNOSIS — G47.00 INSOMNIA, UNSPECIFIED TYPE: ICD-10-CM

## 2020-09-23 DIAGNOSIS — Z00.00 ROUTINE GENERAL MEDICAL EXAMINATION AT A HEALTH CARE FACILITY: Primary | ICD-10-CM

## 2020-09-23 DIAGNOSIS — J45.20 INTERMITTENT ASTHMA, UNCOMPLICATED: ICD-10-CM

## 2020-09-23 DIAGNOSIS — E04.9 THYROID ENLARGEMENT: ICD-10-CM

## 2020-09-23 DIAGNOSIS — M51.26 LUMBAR DISC HERNIATION: ICD-10-CM

## 2020-09-23 LAB
ALBUMIN SERPL-MCNC: 4 G/DL (ref 3.4–5)
ALP SERPL-CCNC: 82 U/L (ref 40–150)
ALT SERPL W P-5'-P-CCNC: 81 U/L (ref 0–50)
ANION GAP SERPL CALCULATED.3IONS-SCNC: 7 MMOL/L (ref 3–14)
AST SERPL W P-5'-P-CCNC: 48 U/L (ref 0–45)
BILIRUB SERPL-MCNC: 0.6 MG/DL (ref 0.2–1.3)
BUN SERPL-MCNC: 9 MG/DL (ref 7–30)
CALCIUM SERPL-MCNC: 9.7 MG/DL (ref 8.5–10.1)
CHLORIDE SERPL-SCNC: 106 MMOL/L (ref 94–109)
CHOLEST SERPL-MCNC: 231 MG/DL
CO2 SERPL-SCNC: 26 MMOL/L (ref 20–32)
CREAT SERPL-MCNC: 0.85 MG/DL (ref 0.52–1.04)
GFR SERPL CREATININE-BSD FRML MDRD: 82 ML/MIN/{1.73_M2}
GLUCOSE SERPL-MCNC: 91 MG/DL (ref 70–99)
HDLC SERPL-MCNC: 68 MG/DL
LDLC SERPL CALC-MCNC: 128 MG/DL
MAGNESIUM SERPL-MCNC: 2.4 MG/DL (ref 1.6–2.3)
NONHDLC SERPL-MCNC: 163 MG/DL
POTASSIUM SERPL-SCNC: 3.7 MMOL/L (ref 3.4–5.3)
PROT SERPL-MCNC: 8.1 G/DL (ref 6.8–8.8)
SODIUM SERPL-SCNC: 139 MMOL/L (ref 133–144)
TRIGL SERPL-MCNC: 176 MG/DL
TSH SERPL DL<=0.005 MIU/L-ACNC: 1.8 MU/L (ref 0.4–4)

## 2020-09-23 PROCEDURE — 80053 COMPREHEN METABOLIC PANEL: CPT | Performed by: FAMILY MEDICINE

## 2020-09-23 PROCEDURE — 99396 PREV VISIT EST AGE 40-64: CPT | Mod: 25 | Performed by: FAMILY MEDICINE

## 2020-09-23 PROCEDURE — 90686 IIV4 VACC NO PRSV 0.5 ML IM: CPT | Performed by: FAMILY MEDICINE

## 2020-09-23 PROCEDURE — 84443 ASSAY THYROID STIM HORMONE: CPT | Performed by: FAMILY MEDICINE

## 2020-09-23 PROCEDURE — 80061 LIPID PANEL: CPT | Performed by: FAMILY MEDICINE

## 2020-09-23 PROCEDURE — 83735 ASSAY OF MAGNESIUM: CPT | Performed by: FAMILY MEDICINE

## 2020-09-23 PROCEDURE — 36415 COLL VENOUS BLD VENIPUNCTURE: CPT | Performed by: FAMILY MEDICINE

## 2020-09-23 PROCEDURE — 90471 IMMUNIZATION ADMIN: CPT | Performed by: FAMILY MEDICINE

## 2020-09-23 RX ORDER — LEVALBUTEROL TARTRATE 45 UG/1
2 AEROSOL, METERED ORAL EVERY 4 HOURS PRN
Qty: 15 G | Refills: 2 | Status: SHIPPED | OUTPATIENT
Start: 2020-09-23

## 2020-09-23 ASSESSMENT — MIFFLIN-ST. JEOR: SCORE: 1499.32

## 2020-09-23 NOTE — PROGRESS NOTES
SUBJECTIVE:   CC: Marjorie Wolf is an 45 year old woman who presents for preventive health visit.       Patient has been advised of split billing requirements and indicates understanding: Yes  Healthy Habits:     Getting at least 3 servings of Calcium per day:  Yes    Bi-annual eye exam:  NO    Dental care twice a year:  Yes    Sleep apnea or symptoms of sleep apnea:  None    Diet:  Vegetarian/vegan    Frequency of exercise:  4-5 days/week    Duration of exercise:  30-45 minutes    Taking medications regularly:  Yes    Medication side effects:  None    PHQ-2 Total Score: 0    Additional concerns today:  Yes          -------------------------------------    Today's PHQ-2 Score:   PHQ-2 ( 1999 [employer]) 9/22/2020   Q1: Little interest or pleasure in doing things 0   Q2: Feeling down, depressed or hopeless 0   PHQ-2 Score 0   Q1: Little interest or pleasure in doing things Not at all   Q2: Feeling down, depressed or hopeless Not at all   PHQ-2 Score 0       Abuse: Current or Past (Physical, Sexual or Emotional) - No  Do you feel safe in your environment? Yes        Social History     Tobacco Use     Smoking status: Never Smoker     Smokeless tobacco: Never Used   Substance Use Topics     Alcohol use: Yes     Alcohol/week: 0.0 standard drinks     Comment: Occas.     If you drink alcohol do you typically have >3 drinks per day or >7 drinks per week? No    No flowsheet data found.    Reviewed orders with patient.  Reviewed health maintenance and updated orders accordingly - Yes  Patient Active Problem List   Diagnosis     Allergic rhinitis due to pollen     Family history of other cardiovascular diseases     Low back pain     Hypertriglyceridemia     CARDIOVASCULAR SCREENING; LDL GOAL LESS THAN 160     Intermittent asthma     Insomnia     SVT (supraventricular tachycardia) (H)     Family history of hemochromatosis     Hashimoto's thyroiditis     Thyroid enlargement     Thyroid nodule     ACP (advance care planning)      Chronic bilateral low back pain without sciatica     Past Surgical History:   Procedure Laterality Date     C APPENDECTOMY  4/1989     C NONSPECIFIC PROCEDURE  6/1990    Nasal surgery for deviated septum     C NONSPECIFIC PROCEDURE  11/1990    Nasal Surgery for deviated septum     ENT SURGERY  1990    2 surgeries for deviated septum     SURGICAL HISTORY OF -   2000    wisdom teeth extraction       Social History     Tobacco Use     Smoking status: Never Smoker     Smokeless tobacco: Never Used   Substance Use Topics     Alcohol use: Yes     Alcohol/week: 0.0 standard drinks     Comment: Occas.     Family History   Problem Relation Age of Onset     Diabetes Mother         Type 2     Arthritis Mother      Depression Mother         anxiety/panic attacks as well     Obesity Mother      Thyroid Disease Mother         dx ~35yrs     Hypertension Mother      Hyperlipidemia Mother      C.A.D. Father         MI at age 54     Gastrointestinal Disease Father         Ulcer H-Pylori?     Lipids Father      Hypertension Father      Hyperlipidemia Father      Genetic Disorder Father      Arthritis Maternal Grandmother      C.A.D. Maternal Grandfather         Artherosclerosis- cause of death, unsure if previous MI     Diabetes Maternal Grandfather         Type 2     Eye Disorder Maternal Grandfather         Glaucoma     Arthritis Sister      Depression Sister      Neurologic Disorder Sister         Migraines     Lipids Sister      Cerebrovascular Disease No family hx of      Breast Cancer No family hx of         Mother had biopsy 2016, found non-malignant     Cancer - colorectal No family hx of            Mammogram Screening: Patient under age 50, mutual decision reflected in health maintenance.      Pertinent mammograms are reviewed under the imaging tab.  History of abnormal Pap smear: NO - age 30-65 PAP every 5 years with negative HPV co-testing recommended  PAP / HPV Latest Ref Rng & Units 6/20/2018 5/16/2014 3/22/2011   PAP  "- NIL NIL NIL   HPV 16 DNA NEG:Negative Negative - -   HPV 18 DNA NEG:Negative Negative - -   OTHER HR HPV NEG:Negative Negative - -     Reviewed and updated as needed this visit by clinical staff  Tobacco  Allergies  Meds  Problems  Med Hx  Surg Hx  Fam Hx         Reviewed and updated as needed this visit by Provider  Tobacco  Allergies  Meds  Problems  Med Hx  Surg Hx  Fam Hx            Review of Systems  CONSTITUTIONAL: NEGATIVE for fever, chills, change in weight  INTEGUMENTARU/SKIN: NEGATIVE for worrisome rashes, moles or lesions  EYES: NEGATIVE for vision changes or irritation  ENT: NEGATIVE for ear, mouth and throat problems  RESP: NEGATIVE for significant cough or SOB  BREAST: NEGATIVE for masses, tenderness or discharge  CV: NEGATIVE for chest pain, palpitations or peripheral edema  GI: NEGATIVE for nausea, abdominal pain, heartburn, or change in bowel habits   female: irregular menses  MUSCULOSKELETAL: NEGATIVE for significant arthralgias or myalgia  NEURO: NEGATIVE for weakness, dizziness or paresthesias  PSYCHIATRIC: NEGATIVE for changes in mood or affect     OBJECTIVE:   /82   Pulse 79   Temp 96.8  F (36  C) (Tympanic)   Ht 1.791 m (5' 10.5\")   Wt 76.6 kg (168 lb 14.4 oz)   LMP 07/05/2020   SpO2 97%   BMI 23.89 kg/m    Physical Exam  GENERAL: healthy, alert and no distress  EYES: Eyes grossly normal to inspection, PERRL and conjunctivae and sclerae normal  HENT: ear canals and TM's normal, nose and mouth without ulcers or lesions  NECK: no adenopathy, no asymmetry, masses, or scars and thyroid normal to palpation  RESP: lungs clear to auscultation - no rales, rhonchi or wheezes  BREAST: normal without masses, tenderness or nipple discharge and no palpable axillary masses or adenopathy  CV: regular rate and rhythm, normal S1 S2, no S3 or S4, no murmur, click or rub, no peripheral edema and peripheral pulses strong  ABDOMEN: soft, nontender, no hepatosplenomegaly, no masses " "and bowel sounds normal  MS: no gross musculoskeletal defects noted, no edema  SKIN: no suspicious lesions or rashes  NEURO: Normal strength and tone, mentation intact and speech normal  PSYCH: mentation appears normal, affect normal/bright    Diagnostic Test Results:  Labs reviewed in Epic    ASSESSMENT/PLAN:   1. Routine general medical examination at a health care facility     - Lipid panel reflex to direct LDL Fasting  - Comprehensive metabolic panel (BMP + Alb, Alk Phos, ALT, AST, Total. Bili, TP)  - Magnesium    2. Insomnia, unspecified type     - SLEEP PSYCHOLOGY REFERRAL  - amitriptyline (ELAVIL) 25 MG tablet; TAKE 3 TABLETS BY MOUTH NIGHTLY AS NEEDED FOR SLEEP  Dispense: 270 tablet; Refill: 3  - Magnesium    3. Lumbar disc herniation     - tiZANidine (ZANAFLEX) 4 MG tablet; Take 1-2 tablets (4-8 mg) by mouth nightly as needed for muscle spasms  Dispense: 180 tablet; Refill: 3    4. Intermittent asthma, uncomplicated     - levalbuterol (XOPENEX HFA) 45 MCG/ACT inhaler; Inhale 2 puffs into the lungs every 4 hours as needed for shortness of breath / dyspnea or wheezing  Dispense: 15 g; Refill: 2    5. Family history of skin cancer     - DERMATOLOGY ADULT REFERRAL; Future    6. Thyroid enlargement     - TSH with free T4 reflex    Patient has been advised of split billing requirements and indicates understanding: Yes  COUNSELING:  Reviewed preventive health counseling, as reflected in patient instructions    Estimated body mass index is 23.89 kg/m  as calculated from the following:    Height as of this encounter: 1.791 m (5' 10.5\").    Weight as of this encounter: 76.6 kg (168 lb 14.4 oz).        She reports that she has never smoked. She has never used smokeless tobacco.      Counseling Resources:  ATP IV Guidelines  Pooled Cohorts Equation Calculator  Breast Cancer Risk Calculator  BRCA-Related Cancer Risk Assessment: FHS-7 Tool  FRAX Risk Assessment  ICSI Preventive Guidelines  Dietary Guidelines for " Americans, 2010  USDA's MyPlate  ASA Prophylaxis  Lung CA Screening    Jeanette Rodrigues, [facility]

## 2020-09-23 NOTE — PATIENT INSTRUCTIONS
Preventive Health Recommendations  Female Ages 40 to 49    Yearly exam:     See your health care provider every year in order to  1. Review health changes.   2. Discuss preventive care.    3. Review your medicines if your doctor prescribed any.      Get a Pap test every three years (unless you have an abnormal result and your provider advises testing more often).      If you get Pap tests with HPV test, you only need to test every 5 years, unless you have an abnormal result. You do not need a Pap test if your uterus was removed (hysterectomy) and you have not had cancer.      You should be tested each year for STDs (sexually transmitted diseases), if you're at risk.     Ask your doctor if you should have a mammogram.      Have a colonoscopy (test for colon cancer) if someone in your family has had colon cancer or polyps before age 50.       Have a cholesterol test every 5 years.       Have a diabetes test (fasting glucose) after age 45. If you are at risk for diabetes, you should have this test every 3 years.    Shots: Get a flu shot each year. Get a tetanus shot every 10 years.     Nutrition:     Eat at least 5 servings of fruits and vegetables each day.    Eat whole-grain bread, whole-wheat pasta and brown rice instead of white grains and rice.    Get adequate Calcium and Vitamin D.      Lifestyle    Exercise at least 150 minutes a week (an average of 30 minutes a day, 5 days a week). This will help you control your weight and prevent disease.    Limit alcohol to one drink per day.    No smoking.     Wear sunscreen to prevent skin cancer.    See your dentist every six months for an exam and cleaning.  
67.3

## 2020-09-24 ENCOUNTER — OFFICE VISIT (OUTPATIENT)
Dept: ORTHOPEDICS | Facility: CLINIC | Age: 45
End: 2020-09-24
Payer: COMMERCIAL

## 2020-09-24 VITALS — WEIGHT: 168 LBS | HEIGHT: 71 IN | BODY MASS INDEX: 23.52 KG/M2

## 2020-09-24 DIAGNOSIS — M54.40 BILATERAL LOW BACK PAIN WITH SCIATICA, SCIATICA LATERALITY UNSPECIFIED, UNSPECIFIED CHRONICITY: ICD-10-CM

## 2020-09-24 DIAGNOSIS — M51.16 LUMBAR DISC HERNIATION WITH RADICULOPATHY: Primary | ICD-10-CM

## 2020-09-24 PROCEDURE — 99213 OFFICE O/P EST LOW 20 MIN: CPT | Performed by: PREVENTIVE MEDICINE

## 2020-09-24 RX ORDER — MELOXICAM 15 MG/1
15 TABLET ORAL DAILY
Qty: 30 TABLET | Refills: 0 | Status: SHIPPED | OUTPATIENT
Start: 2020-09-24 | End: 2020-10-20

## 2020-09-24 ASSESSMENT — PAIN SCALES - GENERAL: PAINLEVEL: SEVERE PAIN (6)

## 2020-09-24 ASSESSMENT — ASTHMA QUESTIONNAIRES: ACT_TOTALSCORE: 25

## 2020-09-24 ASSESSMENT — MIFFLIN-ST. JEOR: SCORE: 1495.23

## 2020-09-24 NOTE — LETTER
9/24/2020         RE: Marjorie Wolf  [address] [facility] [id number]        Dear Colleague,    Thank you for referring your patient, Marjorie Wolf, to the [facility]. Please see a copy of my visit note below.    HISTORY OF PRESENT ILLNESS  Ms. Wolf is a pleasant 45 year old year old female who presents to clinic today with f/u for low back pain  Marjorie explains that she had her 2nd KENNEDY in July and is continuing to feel better and PT is helping  Location: low back  Quality:  achy pain    Severity: {3-4/10 at worst    Duration: 6+ months  Timing: occurs intermittently  Context: occurs while exercising and lifting  Modifying factors:  resting and non-use makes it better, movement and use makes it worse  Associated signs & symptoms: none    MEDICAL HISTORY  Patient Active Problem List   Diagnosis     Allergic rhinitis due to pollen     Family history of other cardiovascular diseases     Low back pain     Hypertriglyceridemia     CARDIOVASCULAR SCREENING; LDL GOAL LESS THAN 160     Intermittent asthma     Insomnia     SVT (supraventricular tachycardia) (H)     Family history of hemochromatosis     Hashimoto's thyroiditis     Thyroid enlargement     Thyroid nodule     ACP (advance care planning)     Chronic bilateral low back pain without sciatica       Current Outpatient Medications   Medication Sig Dispense Refill     amitriptyline (ELAVIL) 25 MG tablet TAKE 3 TABLETS BY MOUTH NIGHTLY AS NEEDED FOR SLEEP 270 tablet 3     Lactase (LACTAID PO)        levalbuterol (XOPENEX HFA) 45 MCG/ACT inhaler Inhale 2 puffs into the lungs every 4 hours as needed for shortness of breath / dyspnea or wheezing 15 g 2     tiZANidine (ZANAFLEX) 4 MG tablet Take 1-2 tablets (4-8 mg) by mouth nightly as needed for muscle spasms 180 tablet 3     UNKNOWN MED DOSAGE digestive advantage for lactose intolorance - 1 tab daily       VITAMIN D, CHOLECALCIFEROL, PO Take by mouth daily         Allergies    Allergen Reactions     Erythromycin Rash     Penicillins Rash     Sulfa Drugs Rash       Family History   Problem Relation Age of Onset     Diabetes Mother         Type 2     Arthritis Mother      Depression Mother         anxiety/panic attacks as well     Obesity Mother      Thyroid Disease Mother         dx ~35yrs     Hypertension Mother      Hyperlipidemia Mother      C.A.D. Father         MI at age 54     Gastrointestinal Disease Father         Ulcer H-Pylori?     Lipids Father      Hypertension Father      Hyperlipidemia Father      Genetic Disorder Father      Arthritis Maternal Grandmother      C.A.D. Maternal Grandfather         Artherosclerosis- cause of death, unsure if previous MI     Diabetes Maternal Grandfather         Type 2     Eye Disorder Maternal Grandfather         Glaucoma     Arthritis Sister      Depression Sister      Neurologic Disorder Sister         Migraines     Lipids Sister      Cerebrovascular Disease No family hx of      Breast Cancer No family hx of         Mother had biopsy 2016, found non-malignant     Cancer - colorectal No family hx of      Social History     Socioeconomic History     Marital status:      Spouse name: Maddie Renee     Number of children: 0     Years of education: Ph.D     Highest education level: Not on file   Occupational History     Occupation:      Employer: [employer]     Comment: Sociology   Social Needs     Financial resource strain: Not on file     Food insecurity     Worry: Not on file     Inability: Not on file     Transportation needs     Medical: Not on file     Non-medical: Not on file   Tobacco Use     Smoking status: Never Smoker     Smokeless tobacco: Never Used   Substance and Sexual Activity     Alcohol use: Yes     Alcohol/week: 0.0 standard drinks     Comment: Occas.     Drug use: No     Sexual activity: Yes     Partners: Female     Birth control/protection: None     Comment: stable relationship   Lifestyle      Physical activity     Days per week: Not on file     Minutes per session: Not on file     Stress: Not on file   Relationships     Social connections     Talks on phone: Not on file     Gets together: Not on file     Attends Catholic service: Not on file     Active member of club or organization: Not on file     Attends meetings of clubs or organizations: Not on file     Relationship status: Not on file     Intimate partner violence     Fear of current or ex partner: Not on file     Emotionally abused: Not on file     Physically abused: Not on file     Forced sexual activity: Not on file   Other Topics Concern     Parent/sibling w/ CABG, MI or angioplasty before 65F 55M? Yes   Social History Narrative    Social Documentation:3/19/10        Balanced Diet: YES    Calcium intake: more than 2 per day    Caffeine: 1-2 cups per day    Exercise:  type of activity gym;  4 times per week    Sunscreen: Yes    Seatbelts:  Yes    Self Breast Exam:  No    Self Testicular Exam: n/a    Physical/Emotional/Sexual Abuse: No    Do you feel safe in your environment? Yes        Cholesterol screen up to date: Lab Test   3/4/09    2/2/07                  0934      0911         CHOL       194       179          HDL        36*       51           LDL        107       103          TRIG       256*      124          CHOLHDLRA* 5.5*      3.5              Eye Exam up to date: No    Dental Exam up to date: Yes    Pap smear up to date: PAP      NIL   3/4/2009    Mammogram up to date: Does Not Apply    Dexa Scan up to date: Does Not Apply    Colonoscopy up to date: Does Not Apply    Immunizations up to date: Yes last td 2005    Glucose screen if over 40:  Yes        Kacy Mccabe CMA                            Additional medical/Social/Surgical histories reviewed in [facility] and updated as appropriate.     REVIEW OF SYSTEMS (9/24/2020)  10 point ROS of systems including Constitutional, Eyes, Respiratory, Cardiovascular, Gastroenterology,  "Genitourinary, Integumentary, Musculoskeletal, Psychiatric, Allergic/Immunologic were all negative except for pertinent positives noted in my HPI.     PHYSICAL EXAM  Vitals:    09/24/20 1042   Weight: 76.2 kg (168 lb)   Height: 1.791 m (5' 10.5\")     Vital Signs: Ht 1.791 m (5' 10.5\")   Wt 76.2 kg (168 lb)   BMI 23.76 kg/m   Patient declined being weighed. Body mass index is 23.76 kg/m .    General  - normal appearance, in no obvious distress  HEENT  - conjunctivae not injected, moist mucous membranes, normocephalic/atraumatic head, ears normal appearance, no lesions, mouth normal appearance, no scars, normal dentition and teeth present  CV  - normal peripheral perfusion  Pulm  - normal respiratory pattern, non-labored  Musculoskeletal - lumbar spine  - stance: normal gait without limp, no obvious leg length discrepancy, normal heel and toe walk  - inspection: normal bone and joint alignment, no obvious scoliosis  - palpation: no paravertebral or bony tenderness  - ROM: flexion exacerbates pain, normal extension with some discomfort, sidebending, rotation  - strength: lower extremities 5/5 in all planes  - special tests:  (-) straight leg raise  (+) slump test  Neuro  - patellar and Achilles DTRs 2+ bilaterally, no lower extremity sensory deficit throughout L5 distribution, grossly normal coordination, normal muscle tone  Skin  - no ecchymosis, erythema, warmth, or induration, no obvious rash  Psych  - interactive, appropriate, normal mood and affect  ASSESSMENT & PLAN  46 yo female with lumbar disc herniation, radicular pain, improved  Cont. HEP  Will pause PT for now  Is starting to work with   mobic PRN  voltaren gel and lidocaine patches PRN  F/u in 6 weeks  Can consider another KENNEDY in OCT/NOV    Appropriate PPE was utilized for prevention of spread of Covid-19.  Lucien Perez MD, [facility]      Again, thank you for allowing me to participate in the care of your patient.  "       Sincerely,        Lucien Perez MD

## 2020-09-24 NOTE — PROGRESS NOTES
HISTORY OF PRESENT ILLNESS  Ms. Wolf is a pleasant 45 year old year old female who presents to clinic today with f/u for low back pain  Marjorie explains that she had her 2nd KENNEDY in July and is continuing to feel better and PT is helping  Location: low back  Quality:  achy pain    Severity: {3-4/10 at worst    Duration: 6+ months  Timing: occurs intermittently  Context: occurs while exercising and lifting  Modifying factors:  resting and non-use makes it better, movement and use makes it worse  Associated signs & symptoms: none    MEDICAL HISTORY  Patient Active Problem List   Diagnosis     Allergic rhinitis due to pollen     Family history of other cardiovascular diseases     Low back pain     Hypertriglyceridemia     CARDIOVASCULAR SCREENING; LDL GOAL LESS THAN 160     Intermittent asthma     Insomnia     SVT (supraventricular tachycardia) (H)     Family history of hemochromatosis     Hashimoto's thyroiditis     Thyroid enlargement     Thyroid nodule     ACP (advance care planning)     Chronic bilateral low back pain without sciatica       Current Outpatient Medications   Medication Sig Dispense Refill     amitriptyline (ELAVIL) 25 MG tablet TAKE 3 TABLETS BY MOUTH NIGHTLY AS NEEDED FOR SLEEP 270 tablet 3     Lactase (LACTAID PO)        levalbuterol (XOPENEX HFA) 45 MCG/ACT inhaler Inhale 2 puffs into the lungs every 4 hours as needed for shortness of breath / dyspnea or wheezing 15 g 2     tiZANidine (ZANAFLEX) 4 MG tablet Take 1-2 tablets (4-8 mg) by mouth nightly as needed for muscle spasms 180 tablet 3     UNKNOWN MED DOSAGE digestive advantage for lactose intolorance - 1 tab daily       VITAMIN D, CHOLECALCIFEROL, PO Take by mouth daily         Allergies   Allergen Reactions     Erythromycin Rash     Penicillins Rash     Sulfa Drugs Rash       Family History   Problem Relation Age of Onset     Diabetes Mother         Type 2     Arthritis Mother      Depression Mother         anxiety/panic attacks as well      Obesity Mother      Thyroid Disease Mother         dx ~35yrs     Hypertension Mother      Hyperlipidemia Mother      C.A.D. Father         MI at age 54     Gastrointestinal Disease Father         Ulcer H-Pylori?     Lipids Father      Hypertension Father      Hyperlipidemia Father      Genetic Disorder Father      Arthritis Maternal Grandmother      C.A.D. Maternal Grandfather         Artherosclerosis- cause of death, unsure if previous MI     Diabetes Maternal Grandfather         Type 2     Eye Disorder Maternal Grandfather         Glaucoma     Arthritis Sister      Depression Sister      Neurologic Disorder Sister         Migraines     Lipids Sister      Cerebrovascular Disease No family hx of      Breast Cancer No family hx of         Mother had biopsy 2016, found non-malignant     Cancer - colorectal No family hx of      Social History     Socioeconomic History     Marital status:      Spouse name: Maddie Renee     Number of children: 0     Years of education: Ph.D     Highest education level: Not on file   Occupational History     Occupation:      Employer: [employer]     Comment: Sociology   Social Needs     Financial resource strain: Not on file     Food insecurity     Worry: Not on file     Inability: Not on file     Transportation needs     Medical: Not on file     Non-medical: Not on file   Tobacco Use     Smoking status: Never Smoker     Smokeless tobacco: Never Used   Substance and Sexual Activity     Alcohol use: Yes     Alcohol/week: 0.0 standard drinks     Comment: Occas.     Drug use: No     Sexual activity: Yes     Partners: Female     Birth control/protection: None     Comment: stable relationship   Lifestyle     Physical activity     Days per week: Not on file     Minutes per session: Not on file     Stress: Not on file   Relationships     Social connections     Talks on phone: Not on file     Gets together: Not on file     Attends Hindu service: Not on file      "Active member of club or organization: Not on file     Attends meetings of clubs or organizations: Not on file     Relationship status: Not on file     Intimate partner violence     Fear of current or ex partner: Not on file     Emotionally abused: Not on file     Physically abused: Not on file     Forced sexual activity: Not on file   Other Topics Concern     Parent/sibling w/ CABG, MI or angioplasty before 65F 55M? Yes   Social History Narrative    Social Documentation:3/19/10        Balanced Diet: YES    Calcium intake: more than 2 per day    Caffeine: 1-2 cups per day    Exercise:  type of activity gym;  4 times per week    Sunscreen: Yes    Seatbelts:  Yes    Self Breast Exam:  No    Self Testicular Exam: n/a    Physical/Emotional/Sexual Abuse: No    Do you feel safe in your environment? Yes        Cholesterol screen up to date: Lab Test   3/4/09    2/2/07                  0934      0911         CHOL       194       179          HDL        36*       51           LDL        107       103          TRIG       256*      124          CHOLHDLRA* 5.5*      3.5              Eye Exam up to date: No    Dental Exam up to date: Yes    Pap smear up to date: PAP      NIL   3/4/2009    Mammogram up to date: Does Not Apply    Dexa Scan up to date: Does Not Apply    Colonoscopy up to date: Does Not Apply    Immunizations up to date: Yes last td 2005    Glucose screen if over 40:  Yes        Kacy Mccabe CMA                            Additional medical/Social/Surgical histories reviewed in [facility] and updated as appropriate.     REVIEW OF SYSTEMS (9/24/2020)  10 point ROS of systems including Constitutional, Eyes, Respiratory, Cardiovascular, Gastroenterology, Genitourinary, Integumentary, Musculoskeletal, Psychiatric, Allergic/Immunologic were all negative except for pertinent positives noted in my HPI.     PHYSICAL EXAM  Vitals:    09/24/20 1042   Weight: 76.2 kg (168 lb)   Height: 1.791 m (5' 10.5\")     Vital Signs: Ht 1.791 " "m (5' 10.5\")   Wt 76.2 kg (168 lb)   BMI 23.76 kg/m   Patient declined being weighed. Body mass index is 23.76 kg/m .    General  - normal appearance, in no obvious distress  HEENT  - conjunctivae not injected, moist mucous membranes, normocephalic/atraumatic head, ears normal appearance, no lesions, mouth normal appearance, no scars, normal dentition and teeth present  CV  - normal peripheral perfusion  Pulm  - normal respiratory pattern, non-labored  Musculoskeletal - lumbar spine  - stance: normal gait without limp, no obvious leg length discrepancy, normal heel and toe walk  - inspection: normal bone and joint alignment, no obvious scoliosis  - palpation: no paravertebral or bony tenderness  - ROM: flexion exacerbates pain, normal extension with some discomfort, sidebending, rotation  - strength: lower extremities 5/5 in all planes  - special tests:  (-) straight leg raise  (+) slump test  Neuro  - patellar and Achilles DTRs 2+ bilaterally, no lower extremity sensory deficit throughout L5 distribution, grossly normal coordination, normal muscle tone  Skin  - no ecchymosis, erythema, warmth, or induration, no obvious rash  Psych  - interactive, appropriate, normal mood and affect  ASSESSMENT & PLAN  46 yo female with lumbar disc herniation, radicular pain, improved  Cont. HEP  Will pause PT for now  Is starting to work with   mobic PRN  voltaren gel and lidocaine patches PRN  F/u in 6 weeks  Can consider another KENNEDY in OCT/NOV    Appropriate PPE was utilized for prevention of spread of Covid-19.  Lucien Perez MD, CAQSM    "

## 2020-10-07 NOTE — RESULT ENCOUNTER NOTE
Dear Marjorie,   Your test results are all back -   -LDL(bad) cholesterol level is elevated, and your triglycerides are elevated which can increase your heart disease risk.  A diet high in fat and simple carbohydrates, genetics and being overweight can contribute to this. ADVISE: exercising 150 minutes of aerobic exercise per week (30 minutes for 5 days per week or 50 minutes for 3 days per week are options), eating a low saturated fat/low carbohydrate diet, and omega-3 fatty acids (fish oil) 2565-6428 mg daily are helpful to improve this. In 12 months, you should recheck your fasting cholesterol panel by scheduling a lab-only appointment.  -Liver and gallbladder tests (ALT,AST, Alk phos,bilirubin) are mildly elevated.  This can be caused by medications, alcohol and supplements.  Lets plan to recheck in one month.  If still elevated, we may need to talk.  -Kidney function (GFR) is normal.  -Sodium is normal.  -Potassium is normal.  -Calcium is normal.  -Glucose (diabetic screening test) is normal.  -TSH (thyroid stimulating hormone) level is normal which indicates normal thyroid function.    Let us know if you have any questions.  -Jeanette Rodrigues, DO

## 2020-10-08 ENCOUNTER — MYC MEDICAL ADVICE (OUTPATIENT)
Dept: FAMILY MEDICINE | Facility: CLINIC | Age: 45
End: 2020-10-08

## 2020-10-08 DIAGNOSIS — R79.89 ELEVATED LFTS: Primary | ICD-10-CM

## 2020-10-17 DIAGNOSIS — M54.40 BILATERAL LOW BACK PAIN WITH SCIATICA, SCIATICA LATERALITY UNSPECIFIED, UNSPECIFIED CHRONICITY: ICD-10-CM

## 2020-10-17 DIAGNOSIS — M51.16 LUMBAR DISC HERNIATION WITH RADICULOPATHY: ICD-10-CM

## 2020-10-20 RX ORDER — MELOXICAM 15 MG/1
TABLET ORAL
Qty: 30 TABLET | Refills: 0 | Status: SHIPPED | OUTPATIENT
Start: 2020-10-20 | End: 2020-11-16

## 2020-11-05 ENCOUNTER — MYC MEDICAL ADVICE (OUTPATIENT)
Dept: FAMILY MEDICINE | Facility: CLINIC | Age: 45
End: 2020-11-05

## 2020-11-06 DIAGNOSIS — R79.89 ELEVATED LFTS: ICD-10-CM

## 2020-11-06 PROBLEM — G89.29 CHRONIC BILATERAL LOW BACK PAIN WITHOUT SCIATICA: Status: RESOLVED | Noted: 2020-08-11 | Resolved: 2020-11-06

## 2020-11-06 PROBLEM — M54.50 CHRONIC BILATERAL LOW BACK PAIN WITHOUT SCIATICA: Status: RESOLVED | Noted: 2020-08-11 | Resolved: 2020-11-06

## 2020-11-06 LAB
ALBUMIN SERPL-MCNC: 4 G/DL (ref 3.4–5)
ALP SERPL-CCNC: 86 U/L (ref 40–150)
ALT SERPL W P-5'-P-CCNC: 54 U/L (ref 0–50)
ANION GAP SERPL CALCULATED.3IONS-SCNC: 4 MMOL/L (ref 3–14)
AST SERPL W P-5'-P-CCNC: 32 U/L (ref 0–45)
BILIRUB SERPL-MCNC: 0.4 MG/DL (ref 0.2–1.3)
BUN SERPL-MCNC: 15 MG/DL (ref 7–30)
CALCIUM SERPL-MCNC: 9.5 MG/DL (ref 8.5–10.1)
CHLORIDE SERPL-SCNC: 108 MMOL/L (ref 94–109)
CO2 SERPL-SCNC: 29 MMOL/L (ref 20–32)
CREAT SERPL-MCNC: 0.76 MG/DL (ref 0.52–1.04)
GFR SERPL CREATININE-BSD FRML MDRD: >90 ML/MIN/{1.73_M2}
GLUCOSE SERPL-MCNC: 76 MG/DL (ref 70–99)
POTASSIUM SERPL-SCNC: 3.7 MMOL/L (ref 3.4–5.3)
PROT SERPL-MCNC: 7.7 G/DL (ref 6.8–8.8)
SODIUM SERPL-SCNC: 141 MMOL/L (ref 133–144)

## 2020-11-06 PROCEDURE — 80053 COMPREHEN METABOLIC PANEL: CPT | Performed by: FAMILY MEDICINE

## 2020-11-06 PROCEDURE — 36415 COLL VENOUS BLD VENIPUNCTURE: CPT | Performed by: FAMILY MEDICINE

## 2020-11-06 NOTE — PROGRESS NOTES
Patient did not return for further treatment.  Please refer to the progress note and goal flowsheet completed on   for discharge information.

## 2020-11-11 NOTE — RESULT ENCOUNTER NOTE
Dear Marjorie,   Your test results are all back -   Liver tests are better - almost back to normal range (so close).  Lets check again in 6 months.  Let us know if you have any questions.  -Jeanette Rodrigues, DO

## 2020-11-16 DIAGNOSIS — M54.40 BILATERAL LOW BACK PAIN WITH SCIATICA, SCIATICA LATERALITY UNSPECIFIED, UNSPECIFIED CHRONICITY: ICD-10-CM

## 2020-11-16 DIAGNOSIS — M51.16 LUMBAR DISC HERNIATION WITH RADICULOPATHY: ICD-10-CM

## 2020-11-16 RX ORDER — MELOXICAM 15 MG/1
TABLET ORAL
Qty: 30 TABLET | Refills: 0 | Status: SHIPPED | OUTPATIENT
Start: 2020-11-16 | End: 2022-09-28

## 2021-04-12 ENCOUNTER — MYC MEDICAL ADVICE (OUTPATIENT)
Dept: FAMILY MEDICINE | Facility: CLINIC | Age: 46
End: 2021-04-12

## 2021-04-12 ENCOUNTER — E-VISIT (OUTPATIENT)
Dept: FAMILY MEDICINE | Facility: CLINIC | Age: 46
End: 2021-04-12
Payer: COMMERCIAL

## 2021-04-12 DIAGNOSIS — L98.9 SKIN LESION: Primary | ICD-10-CM

## 2021-04-12 PROCEDURE — 99421 OL DIG E/M SVC 5-10 MIN: CPT | Performed by: FAMILY MEDICINE

## 2021-04-13 NOTE — TELEPHONE ENCOUNTER
PN,  Please see below AddSearchhart message and advise.  Additional info for lio  Thanks,  Katarzyna CENTENO RN

## 2021-04-28 NOTE — TELEPHONE ENCOUNTER
PN  Please see Alexis Bittar message below.  Thanks, Adriana Barr RN     no lesions,  no deformities,  no traumatic injuries,  no significant scars are present,  chest wall non-tender,  no masses present, breathing is unlabored without accessory muscle use,normal breath sounds

## 2021-05-13 ENCOUNTER — OFFICE VISIT (OUTPATIENT)
Dept: FAMILY MEDICINE | Facility: CLINIC | Age: 46
End: 2021-05-13
Payer: COMMERCIAL

## 2021-05-13 VITALS — DIASTOLIC BLOOD PRESSURE: 72 MMHG | SYSTOLIC BLOOD PRESSURE: 114 MMHG

## 2021-05-13 DIAGNOSIS — L81.4 LENTIGINES: ICD-10-CM

## 2021-05-13 DIAGNOSIS — L98.9 SKIN LESION: ICD-10-CM

## 2021-05-13 DIAGNOSIS — L82.1 SEBORRHEIC KERATOSES: ICD-10-CM

## 2021-05-13 DIAGNOSIS — D22.9 MULTIPLE BENIGN NEVI: ICD-10-CM

## 2021-05-13 DIAGNOSIS — D48.5 NEOPLASM OF UNCERTAIN BEHAVIOR OF SKIN: ICD-10-CM

## 2021-05-13 DIAGNOSIS — L82.0 INFLAMED SEBORRHEIC KERATOSIS: Primary | ICD-10-CM

## 2021-05-13 DIAGNOSIS — D18.01 CHERRY ANGIOMA: ICD-10-CM

## 2021-05-13 PROCEDURE — 11102 TANGNTL BX SKIN SINGLE LES: CPT | Mod: 59 | Performed by: PHYSICIAN ASSISTANT

## 2021-05-13 PROCEDURE — 17110 DESTRUCTION B9 LES UP TO 14: CPT | Performed by: PHYSICIAN ASSISTANT

## 2021-05-13 PROCEDURE — 88305 TISSUE EXAM BY PATHOLOGIST: CPT | Performed by: DERMATOLOGY

## 2021-05-13 PROCEDURE — 99203 OFFICE O/P NEW LOW 30 MIN: CPT | Mod: 25 | Performed by: PHYSICIAN ASSISTANT

## 2021-05-13 NOTE — PATIENT INSTRUCTIONS
Wound Care Instructions     FOR SUPERFICIAL WOUNDS     [facility] [phone]    [facility] [phone]          AFTER 24 HOURS YOU SHOULD REMOVE THE BANDAGE AND BEGIN DAILY DRESSING CHANGES AS FOLLOWS:     1) Remove Dressing.     2) Clean and dry the area with tap water using a Q-tip or sterile gauze pad.     3) Apply Vaseline, Aquaphor, Polysporin ointment or Bacitracin ointment over entire wound.  Do NOT use Neosporin ointment.     4) Cover the wound with a band-aid, or a sterile non-stick gauze pad and micropore paper tape      REPEAT THESE INSTRUCTIONS AT LEAST ONCE A DAY UNTIL THE WOUND HAS COMPLETELY HEALED.    It is an old wives tale that a wound heals better when it is exposed to air and allowed to dry out. The wound will heal faster with a better cosmetic result if it is kept moist with ointment and covered with a bandage.    **Do not let the wound dry out.**      Supplies Needed:      *Cotton tipped applicators (Q-tips)    *Polysporin Ointment or Bacitracin Ointment (NOT NEOSPORIN)    *Band-aids or non-stick gauze pads and micropore paper tape.      PATIENT INFORMATION:    During the healing process you will notice a number of changes. All wounds develop a small halo of redness surrounding the wound.  This means healing is occurring. Severe itching with extensive redness usually indicates sensitivity to the ointment or bandage tape used to dress the wound.  You should call our office if this develops.      Swelling  and/or discoloration around your surgical site is common, particularly when performed around the eye.    All wounds normally drain.  The larger the wound the more drainage there will be.  After 7-10 days, you will notice the wound beginning to shrink and new skin will begin to grow.  The wound is healed when you can see skin has formed over the entire area.  A healed wound has a healthy, shiny look to the surface and is red to dark pink in color to  normalize.  Wounds may take approximately 4-6 weeks to heal.  Larger wounds may take 6-8 weeks.  After the wound is healed you may discontinue dressing changes.    You may experience a sensation of tightness as your wound heals. This is normal and will gradually subside.    Your healed wound may be sensitive to temperature changes. This sensitivity improves with time, but if you re having a lot of discomfort, try to avoid temperature extremes.    Patients frequently experience itching after their wound appears to have healed because of the continue healing under the skin.  Plain Vaseline will help relieve the itching.        POSSIBLE COMPLICATIONS    BLEEDIN. Leave the bandage in place.  2. Use tightly rolled up gauze or a cloth to apply direct pressure over the bandage for 30  minutes.  3. Reapply pressure for an additional 30 minutes if necessary  4. Use additional gauze and tape to maintain pressure once the bleeding has stopped.    WOUND CARE INSTRUCTIONS  FOR CRYOSURGERY        This area treated with liquid nitrogen will form a blister. You do not need to bandage the area until after the blister forms and breaks (which may be a few days).  When the blister breaks, begin daily dressing changes as follows:    1) Clean and dry the area with tap water using clean Q-tip or sterile gauze pad.    2) Apply Polysporin ointment or Bacitracin ointment over entire wound.  Do NOT use Neosporin ointment.    3) Cover the wound with a band-aid or sterile non-stick gauze pad and micropore paper tape.      REPEAT THESE INSTRUCTIONS AT LEAST ONCE A DAY UNTIL THE WOUND HAS COMPLETELY HEALED.        It is an old wives tale that a wound heals better when it is exposed to air and allowed to dry out. The wound will heal faster with a better cosmetic result if it is kept moist with ointment and covered with a bandage.  Do not let the wound dry out.      Supplies Needed:     *Cotton tipped applicators (Q-tips)   *Polysporin  ointment or Bacitracin ointment (NOT NEOSPORIN)   *Band-aids, or non stick gauze pads and micropore paper tape    PATIENT INFORMATION    During the healing process you will notice a number of changes. All wounds develop a small halo of redness surrounding the wound.  This means healing is occurring. Severe itching with extensive redness usually indicates sensitivity to the ointment or bandage tape used to dress the wound.  You should call our office if this develops.      Swelling and/or discoloration around your surgical site is common, particularly when performed around the eye.    All wounds normally drain.  The larger the wound the more drainage there will be.  After 7-10 days, you will notice the wound beginning to shrink and new skin will begin to grow.  The wound is healed when you can see skin has formed over the entire area.  A healed wound has a healthy, shiny look to the surface and is red to dark pink in color to normalize.  Wounds may take approximately 4-6 weeks to heal.  Larger wounds may take 6-8 weeks.  After the wound is healed you may discontinue dressing changes.    You may experience a sensation of tightness as your wound heals. This is normal and will gradually subside.    Your healed wound may be sensitive to temperature changes. This sensitivity improves with time, but if you re having a lot of discomfort, try to avoid temperature extremes.    Patients frequently experience itching after their wound appears to have healed because of the continue healing under the skin.  Plain Vaseline will help relieve the itching.             Proper skin care from [facility]:    -Eliminate harsh soaps as they strip the natural oils from the skin, often resulting in dry itchy skin ( i.e. Dial, Zest, Citlali Spring)  -Use mild soaps such as Cetaphil or Dove Sensitive Skin in the shower. You do not need to use soap on arms, legs, and trunk every time you shower unless visibly soiled.   -Avoid hot or  cold showers.  -After showering, lightly dry off and apply moisturizing within 2-3 minutes. This will help trap moisture in the skin.   -Aggressive use of a moisturizer at least 1-2 times a day to the entire body (including -Vanicream, Cetaphil, Aquaphor or Cerave) and moisturize hands after every washing.  -We recommend using moisturizers that come in a tub that needs to be scooped out, not a pump. This has more of an oil base. It will hold moisture in your skin much better than a water base moisturizer. The above recommended are non-pore clogging.      Wear a sunscreen with at least SPF 30 on your face, ears, neck and V of the chest daily. Wear sunscreen on other areas of the body if those areas are exposed to the sun throughout the day. Sunscreens can contain physical and/or chemical blockers. Physical blockers are less likely to clog pores, these include zinc oxide and titanium dioxide. Reapply every two hour and after swimming. Sunscreen examples include Neutrogena, CeraVe, Collin Lizard, Elta MD and many others.    UV radiation  UVA radiation remains constant throughout the day and throughout the year. It is a longer wavelength than UVB and therefore penetrates deeper into the skin leading to immediate and delayed tanning, photoaging, and skin cancer. 70-80% of UVA and UVB radiation occurs between the hours of 10am-2pm.  UVB radiation  UVB radiation causes the most harmful effects and is more significant during the summer months. However, snow and ice can reflect UVB radiation leading to skin damage during the winter months as well. UVB radiation is responsible for tanning, burning, inflammation, delayed erythema (pinkness), pigmentation (brown spots), and skin cancer.     I recommend self monthly full body exams and yearly full body exams with a dermatology provider. If you develop a new or changing lesion please follow up for examination. Most skin cancers are pink and scaly or pink and pearly. However, we do  see blue/brown/black skin cancers.  Consider the ABCDEs of melanoma when giving yourself your monthly full body exam ( don't forget the groin, buttocks, feet, toes, etc). A-asymmetry, B-borders, C-color, D-diameter, E-elevation or evolving. If you see any of these changes please follow up in clinic. If you cannot see your back I recommend purchasing a hand held mirror to use with a larger wall mirror.

## 2021-05-13 NOTE — LETTER
5/13/2021         RE: Marjorie Wolf  [address] [facility] [id number]        Dear Colleague,    Thank you for referring your patient, Marjorie Wolf, to the [facility]. Please see a copy of my visit note below.    HPI:  I was asked to see pt by Dr. Rodrigues. Marjorie Wolf is a 46 year old adult patient here today for FBE. Has a spot on  .  Patient states this has been present for a while.  Patient reports the following symptoms: irritated .  Patient reports the following previous treatments: none.  Patient reports the following modifying factors: none.  Associated symptoms: none.  Patient has no other skin complaints today.  Remainder of the HPI, Meds, PMH, Allergies, FH, and SH was reviewed in chart.    Pertinent Hx:   Mother had a hx of BCC and SCC  Past Medical History:   Diagnosis Date     Allergic rhinitis due to other allergen      Arthritis      Insomnia     benadryl nightly (50mg), se's on trazodone     Mild intermittent asthma     uses maxair 2-3x/yr     Personal history of other diseases of circulatory system 1995    Virally induced stroke- high fever, resolved completely     SVT (supraventricular tachycardia) (H)      Thyroid disease May 2014    Hypothyroidism       Past Surgical History:   Procedure Laterality Date     C APPENDECTOMY  4/1989     ENT SURGERY  1990    2 surgeries for deviated septum     SURGICAL HISTORY OF -   2000    wisdom teeth extraction     ZZC NONSPECIFIC PROCEDURE  6/1990    Nasal surgery for deviated septum     ZZC NONSPECIFIC PROCEDURE  11/1990    Nasal Surgery for deviated septum        Family History   Problem Relation Age of Onset     Diabetes Mother         Type 2     Arthritis Mother      Depression Mother         anxiety/panic attacks as well     Obesity Mother      Thyroid Disease Mother         dx ~35yrs     Hypertension Mother      Hyperlipidemia Mother      Skin Cancer Mother      C.A.D. Father         MI at age 54      Gastrointestinal Disease Father         Ulcer H-Pylori?     Lipids Father      Hypertension Father      Hyperlipidemia Father      Genetic Disorder Father      Arthritis Maternal Grandmother      C.A.D. Maternal Grandfather         Artherosclerosis- cause of death, unsure if previous MI     Diabetes Maternal Grandfather         Type 2     Eye Disorder Maternal Grandfather         Glaucoma     Arthritis Sister      Depression Sister      Neurologic Disorder Sister         Migraines     Lipids Sister      Cerebrovascular Disease No family hx of      Breast Cancer No family hx of         Mother had biopsy 2016, found non-malignant     Cancer - colorectal No family hx of        Social History     Socioeconomic History     Marital status:      Spouse name: Maddie Renee     Number of children: 0     Years of education: Ph.D     Highest education level: Not on file   Occupational History     Occupation:      Employer: [employer]     Comment: Sociology   Social Needs     Financial resource strain: Not on file     Food insecurity     Worry: Not on file     Inability: Not on file     Transportation needs     Medical: Not on file     Non-medical: Not on file   Tobacco Use     Smoking status: Never Smoker     Smokeless tobacco: Never Used   Substance and Sexual Activity     Alcohol use: Yes     Alcohol/week: 0.0 standard drinks     Comment: Occas.     Drug use: No     Sexual activity: Yes     Partners: Female     Birth control/protection: None     Comment: stable relationship   Lifestyle     Physical activity     Days per week: Not on file     Minutes per session: Not on file     Stress: Not on file   Relationships     Social connections     Talks on phone: Not on file     Gets together: Not on file     Attends [employer] service: Not on file     Active member of club or organization: Not on file     Attends meetings of clubs or organizations: Not on file     Relationship status: Not on file      Intimate partner violence     Fear of current or ex partner: Not on file     Emotionally abused: Not on file     Physically abused: Not on file     Forced sexual activity: Not on file   Other Topics Concern     Parent/sibling w/ CABG, MI or angioplasty before 65F 55M? Yes   Social History Narrative    Social Documentation:3/19/10        Balanced Diet: YES    Calcium intake: more than 2 per day    Caffeine: 1-2 cups per day    Exercise:  type of activity gym;  4 times per week    Sunscreen: Yes    Seatbelts:  Yes    Self Breast Exam:  No    Self Testicular Exam: n/a    Physical/Emotional/Sexual Abuse: No    Do you feel safe in your environment? Yes        Cholesterol screen up to date: Lab Test   3/4/09    2/2/07                  0934      0911         CHOL       194       179          HDL        36*       51           LDL        107       103          TRIG       256*      124          CHOLHDLRA* 5.5*      3.5              Eye Exam up to date: No    Dental Exam up to date: Yes    Pap smear up to date: PAP      NIL   3/4/2009    Mammogram up to date: Does Not Apply    Dexa Scan up to date: Does Not Apply    Colonoscopy up to date: Does Not Apply    Immunizations up to date: Yes last td 2005    Glucose screen if over 40:  Yes        Kacy Mccabe [facility]                            Outpatient Encounter Medications as of 5/13/2021   Medication Sig Dispense Refill     amitriptyline (ELAVIL) 25 MG tablet TAKE 3 TABLETS BY MOUTH NIGHTLY AS NEEDED FOR SLEEP 270 tablet 3     Lactase (LACTAID PO)        levalbuterol (XOPENEX HFA) 45 MCG/ACT inhaler Inhale 2 puffs into the lungs every 4 hours as needed for shortness of breath / dyspnea or wheezing 15 g 2     meloxicam (MOBIC) 15 MG tablet TAKE 1 TABLET BY MOUTH EVERY DAY 30 tablet 0     tiZANidine (ZANAFLEX) 4 MG tablet Take 1-2 tablets (4-8 mg) by mouth nightly as needed for muscle spasms 180 tablet 3     UNKNOWN MED DOSAGE digestive advantage for lactose intolorance - 1 tab  daily       VITAMIN D, CHOLECALCIFEROL, PO Take by mouth daily       No facility-administered encounter medications on file as of 5/13/2021.        Review Of Systems:  Skin: mole  Eyes: negative  Ears/Nose/Throat: negative  Respiratory: No shortness of breath, dyspnea on exertion, cough, or hemoptysis  Cardiovascular: negative  Gastrointestinal: negative  Genitourinary: negative  Musculoskeletal: negative  Neurologic: negative  Psychiatric: negative  Hematologic/Lymphatic/Immunologic: negative  Endocrine: negative      Objective:     /72   Eyes: Conjunctivae/lids: Normal   ENT: Lips:  Normal  MSK: Normal  Cardiovascular: Peripheral edema none  Pulm: Breathing Normal  Neuro/Psych: Orientation: A/O x 3. Normal; Mood/Affect: Normal, NAD, WDWN  Pt accompanied by: self  Following areas examined: Scalp, face, eyelids, lips, neck, chest, abdomen, back, and R&L upper and lower extremities. Pt defers exam of buttock, hips, groin and genitals.   Ashley skin type:i   Findings:  Red smooth well-defined macules on trunk and extremities.  Brown, stuck-on scaly appearing papules on trunk and extremities.  Well circumscribed macules with symmetric color distribution on trunk and extremities.  Tan WD smooth macules on face, neck, trunk, and extremities.  Inflamed brown, stuck-on scaly appearing papules on back  Brown and red smooth macule with irregular pigment on left medial mid back 0.3cm    Assessment and Plan:     1) Cherry angiomas, Seborrheic keratoses, Benign nevi, Lentigines     I discussed the specifics of tumor, prognosis, and genetics of benign lesions.  I explained that treatment of these lesions would be purely cosmetic and not medically neccessary.  I discussed with patient different removal options including excision, cryotherapy, cautery and /or laser.  Lesion may recur and/or may not completely resolve. May need additional treatment.     2) ISK x 1  Benign etiology and course of lesion.  LN2: Treated with  LN2 for 5s for 1-2 cycles. Warned risks of blistering, pain, pigment change, scarring, and incomplete resolution.  Advised patient to return if lesions do not completely resolve within 2-3 months.  Wound care sheet given.  3) Neoplasm of uncertain behavior left medial mid back 0.3cm  Rule out atypical neuvus  TANGENTIAL BIOPSY:  After consent, anesthesia with LEC and prep, tangential biopsy performed.  No complications and routine wound care.  May grow back and will get a scar. Based on lesion type may need to completely remove lesion. Patient will be notified in 7-10 days of results. Wound care directions given.      Signs and Symptoms of non-melanoma skin cancer and ABCDEs of melanoma reviewed with patient. Patient encouraged to perform monthly self skin exams and educated on how to perform them. UV precautions reviewed with patient. Patient was asked about new or changing moles/lesions on body.   Wear a sunscreen with at least SPF 30 on your face, ears, neck and V of the chest daily. Wear sunscreen on other areas of the body if those areas are exposed to the sun throughout the day. Sunscreens can contain physical and/or chemical blockers. Physical blockers are less likely to clog pores, these include zinc oxide and titanium dioxide. Reapply every two hour and after swimming. Sunscreen examples include Neutrogena, CeraVe, Blue Lizard, Elta MD and many others.    Proper skin care from [facility]:    -Eliminate harsh soaps as they strip the natural oils from the skin, often resulting in dry itchy skin ( i.e. Dial, Zest, Citlali Spring)  -Use mild soaps such as Cetaphil or Dove Sensitive Skin in the shower. You do not need to use soap on arms, legs, and trunk every time you shower unless visibly soiled.   -Avoid hot or cold showers.  -After showering, lightly dry off and apply moisturizing within 2-3 minutes. This will help trap moisture in the skin.   -Aggressive use of a moisturizer at least 1-2 times a day  to the entire body (including -Vanicream, Cetaphil, Aquaphor or Cerave) and moisturize hands after every washing.  -We recommend using moisturizers that come in a tub that needs to be scooped out, not a pump. This has more of an oil base. It will hold moisture in your skin much better than a water base moisturizer. The above recommended are non-pore clogging.         It was a pleasure speaking with Marjorie Wolf today.       Follow up in yearly FBE        Again, thank you for allowing me to participate in the care of your patient.        Sincerely,        Anuja Singh PA-C

## 2021-05-13 NOTE — PROGRESS NOTES
HPI:  I was asked to see pt by Dr. Rodrigues. Marjorie Wolf is a 46 year old adult patient here today for FBE. Has a spot on  .  Patient states this has been present for a while.  Patient reports the following symptoms: irritated .  Patient reports the following previous treatments: none.  Patient reports the following modifying factors: none.  Associated symptoms: none.  Patient has no other skin complaints today.  Remainder of the HPI, Meds, PMH, Allergies, FH, and SH was reviewed in chart.    Pertinent Hx:   Mother had a hx of BCC and SCC  Past Medical History:   Diagnosis Date     Allergic rhinitis due to other allergen      Arthritis      Insomnia     benadryl nightly (50mg), se's on trazodone     Mild intermittent asthma     uses maxair 2-3x/yr     Personal history of other diseases of circulatory system 1995    Virally induced stroke- high fever, resolved completely     SVT (supraventricular tachycardia) (H)      Thyroid disease May 2014    Hypothyroidism       Past Surgical History:   Procedure Laterality Date     C APPENDECTOMY  4/1989     ENT SURGERY  1990    2 surgeries for deviated septum     SURGICAL HISTORY OF -   2000    wisdom teeth extraction     ZZC NONSPECIFIC PROCEDURE  6/1990    Nasal surgery for deviated septum     ZZC NONSPECIFIC PROCEDURE  11/1990    Nasal Surgery for deviated septum        Family History   Problem Relation Age of Onset     Diabetes Mother         Type 2     Arthritis Mother      Depression Mother         anxiety/panic attacks as well     Obesity Mother      Thyroid Disease Mother         dx ~35yrs     Hypertension Mother      Hyperlipidemia Mother      Skin Cancer Mother      C.A.D. Father         MI at age 54     Gastrointestinal Disease Father         Ulcer H-Pylori?     Lipids Father      Hypertension Father      Hyperlipidemia Father      Genetic Disorder Father      Arthritis Maternal Grandmother      C.A.D. Maternal Grandfather         Artherosclerosis- cause of death,  unsure if previous MI     Diabetes Maternal Grandfather         Type 2     Eye Disorder Maternal Grandfather         Glaucoma     Arthritis Sister      Depression Sister      Neurologic Disorder Sister         Migraines     Lipids Sister      Cerebrovascular Disease No family hx of      Breast Cancer No family hx of         Mother had biopsy 2016, found non-malignant     Cancer - colorectal No family hx of        Social History     Socioeconomic History     Marital status:      Spouse name: Maddie Renee     Number of children: 0     Years of education: Ph.D     Highest education level: Not on file   Occupational History     Occupation:      Employer: [employer]     Comment: Sociology   Social Needs     Financial resource strain: Not on file     Food insecurity     Worry: Not on file     Inability: Not on file     Transportation needs     Medical: Not on file     Non-medical: Not on file   Tobacco Use     Smoking status: Never Smoker     Smokeless tobacco: Never Used   Substance and Sexual Activity     Alcohol use: Yes     Alcohol/week: 0.0 standard drinks     Comment: Occas.     Drug use: No     Sexual activity: Yes     Partners: Female     Birth control/protection: None     Comment: stable relationship   Lifestyle     Physical activity     Days per week: Not on file     Minutes per session: Not on file     Stress: Not on file   Relationships     Social connections     Talks on phone: Not on file     Gets together: Not on file     Attends Hoahaoism service: Not on file     Active member of club or organization: Not on file     Attends meetings of clubs or organizations: Not on file     Relationship status: Not on file     Intimate partner violence     Fear of current or ex partner: Not on file     Emotionally abused: Not on file     Physically abused: Not on file     Forced sexual activity: Not on file   Other Topics Concern     Parent/sibling w/ CABG, MI or angioplasty before 65F 55M?  Yes   Social History Narrative    Social Documentation:3/19/10        Balanced Diet: YES    Calcium intake: more than 2 per day    Caffeine: 1-2 cups per day    Exercise:  type of activity gym;  4 times per week    Sunscreen: Yes    Seatbelts:  Yes    Self Breast Exam:  No    Self Testicular Exam: n/a    Physical/Emotional/Sexual Abuse: No    Do you feel safe in your environment? Yes        Cholesterol screen up to date: Lab Test   3/4/09    2/2/07                  0934      0911         CHOL       194       179          HDL        36*       51           LDL        107       103          TRIG       256*      124          CHOLHDLRA* 5.5*      3.5              Eye Exam up to date: No    Dental Exam up to date: Yes    Pap smear up to date: PAP      NIL   3/4/2009    Mammogram up to date: Does Not Apply    Dexa Scan up to date: Does Not Apply    Colonoscopy up to date: Does Not Apply    Immunizations up to date: Yes last td 2005    Glucose screen if over 40:  Yes        [facility]                            Outpatient Encounter Medications as of 5/13/2021   Medication Sig Dispense Refill     amitriptyline (ELAVIL) 25 MG tablet TAKE 3 TABLETS BY MOUTH NIGHTLY AS NEEDED FOR SLEEP 270 tablet 3     Lactase (LACTAID PO)        levalbuterol (XOPENEX HFA) 45 MCG/ACT inhaler Inhale 2 puffs into the lungs every 4 hours as needed for shortness of breath / dyspnea or wheezing 15 g 2     meloxicam (MOBIC) 15 MG tablet TAKE 1 TABLET BY MOUTH EVERY DAY 30 tablet 0     tiZANidine (ZANAFLEX) 4 MG tablet Take 1-2 tablets (4-8 mg) by mouth nightly as needed for muscle spasms 180 tablet 3     UNKNOWN MED DOSAGE digestive advantage for lactose intolorance - 1 tab daily       VITAMIN D, CHOLECALCIFEROL, PO Take by mouth daily       No facility-administered encounter medications on file as of 5/13/2021.        Review Of Systems:  Skin: mole  Eyes: negative  Ears/Nose/Throat: negative  Respiratory: No shortness of breath, dyspnea on  exertion, cough, or hemoptysis  Cardiovascular: negative  Gastrointestinal: negative  Genitourinary: negative  Musculoskeletal: negative  Neurologic: negative  Psychiatric: negative  Hematologic/Lymphatic/Immunologic: negative  Endocrine: negative      Objective:     /72   Eyes: Conjunctivae/lids: Normal   ENT: Lips:  Normal  MSK: Normal  Cardiovascular: Peripheral edema none  Pulm: Breathing Normal  Neuro/Psych: Orientation: A/O x 3. Normal; Mood/Affect: Normal, NAD, WDWN  Pt accompanied by: self  Following areas examined: Scalp, face, eyelids, lips, neck, chest, abdomen, back, and R&L upper and lower extremities. Pt defers exam of buttock, hips, groin and genitals.   Ashley skin type:i   Findings:  Red smooth well-defined macules on trunk and extremities.  Brown, stuck-on scaly appearing papules on trunk and extremities.  Well circumscribed macules with symmetric color distribution on trunk and extremities.  Tan WD smooth macules on face, neck, trunk, and extremities.  Inflamed brown, stuck-on scaly appearing papules on back  Brown and red smooth macule with irregular pigment on left medial mid back 0.3cm    Assessment and Plan:     1) Cherry angiomas, Seborrheic keratoses, Benign nevi, Lentigines     I discussed the specifics of tumor, prognosis, and genetics of benign lesions.  I explained that treatment of these lesions would be purely cosmetic and not medically neccessary.  I discussed with patient different removal options including excision, cryotherapy, cautery and /or laser.  Lesion may recur and/or may not completely resolve. May need additional treatment.     2) ISK x 1  Benign etiology and course of lesion.  LN2: Treated with LN2 for 5s for 1-2 cycles. Warned risks of blistering, pain, pigment change, scarring, and incomplete resolution.  Advised patient to return if lesions do not completely resolve within 2-3 months.  Wound care sheet given.  3) Neoplasm of uncertain behavior left medial  mid back 0.3cm  Rule out atypical neuvus  TANGENTIAL BIOPSY:  After consent, anesthesia with LEC and prep, tangential biopsy performed.  No complications and routine wound care.  May grow back and will get a scar. Based on lesion type may need to completely remove lesion. Patient will be notified in 7-10 days of results. Wound care directions given.      Signs and Symptoms of non-melanoma skin cancer and ABCDEs of melanoma reviewed with patient. Patient encouraged to perform monthly self skin exams and educated on how to perform them. UV precautions reviewed with patient. Patient was asked about new or changing moles/lesions on body.   Wear a sunscreen with at least SPF 30 on your face, ears, neck and V of the chest daily. Wear sunscreen on other areas of the body if those areas are exposed to the sun throughout the day. Sunscreens can contain physical and/or chemical blockers. Physical blockers are less likely to clog pores, these include zinc oxide and titanium dioxide. Reapply every two hour and after swimming. Sunscreen examples include Neutrogena, CeraVe, Blue Lizard, Elta MD and many others.    Proper skin care from [facility]:    -Eliminate harsh soaps as they strip the natural oils from the skin, often resulting in dry itchy skin ( i.e. Dial, Zest, Ethiopian Spring)  -Use mild soaps such as Cetaphil or Dove Sensitive Skin in the shower. You do not need to use soap on arms, legs, and trunk every time you shower unless visibly soiled.   -Avoid hot or cold showers.  -After showering, lightly dry off and apply moisturizing within 2-3 minutes. This will help trap moisture in the skin.   -Aggressive use of a moisturizer at least 1-2 times a day to the entire body (including -Vanicream, Cetaphil, Aquaphor or Cerave) and moisturize hands after every washing.  -We recommend using moisturizers that come in a tub that needs to be scooped out, not a pump. This has more of an oil base. It will hold moisture in your  skin much better than a water base moisturizer. The above recommended are non-pore clogging.         It was a pleasure speaking with Marjorie Wolf today.       Follow up in yearly FBE

## 2021-05-17 LAB — COPATH REPORT: NORMAL

## 2021-08-27 ENCOUNTER — HOSPITAL ENCOUNTER (OUTPATIENT)
Dept: MAMMOGRAPHY | Facility: CLINIC | Age: 46
Discharge: HOME | End: 2021-08-27
Attending: FAMILY MEDICINE | Admitting: FAMILY MEDICINE
Payer: COMMERCIAL

## 2021-08-27 DIAGNOSIS — Z12.31 VISIT FOR SCREENING MAMMOGRAM: ICD-10-CM

## 2021-08-27 PROCEDURE — 77063 BREAST TOMOSYNTHESIS BI: CPT

## 2021-09-19 ENCOUNTER — HEALTH MAINTENANCE LETTER (OUTPATIENT)
Age: 46
End: 2021-09-19

## 2021-09-21 ASSESSMENT — ENCOUNTER SYMPTOMS
COUGH: 0
MYALGIAS: 0
PARESTHESIAS: 0
SHORTNESS OF BREATH: 0
WEAKNESS: 0
ABDOMINAL PAIN: 0
PALPITATIONS: 0
NAUSEA: 0
SORE THROAT: 0
FREQUENCY: 0
DIZZINESS: 0
HEMATURIA: 0
ARTHRALGIAS: 0
CHILLS: 0
JOINT SWELLING: 0
DIARRHEA: 0
CONSTIPATION: 0
BREAST MASS: 0
EYE PAIN: 0
HEADACHES: 0
FEVER: 0
DYSURIA: 0
NERVOUS/ANXIOUS: 0
HEARTBURN: 0
HEMATOCHEZIA: 0

## 2021-09-23 ASSESSMENT — ENCOUNTER SYMPTOMS
DYSURIA: 0
CONSTIPATION: 0
HEMATURIA: 0
DIZZINESS: 0
PALPITATIONS: 0
ABDOMINAL PAIN: 0
DIARRHEA: 0
SHORTNESS OF BREATH: 0
NERVOUS/ANXIOUS: 0
HEADACHES: 0
MYALGIAS: 0
EYE PAIN: 0
CHILLS: 0
NAUSEA: 0
FEVER: 0
WEAKNESS: 0
COUGH: 0
FREQUENCY: 0
JOINT SWELLING: 0
SORE THROAT: 0
ARTHRALGIAS: 0

## 2021-09-23 NOTE — PROGRESS NOTES
SUBJECTIVE:   CC: Marjorie Wolf is an 46 year old woman who presents for preventive health visit.       Patient has been advised of split billing requirements and indicates understanding: Yes  Healthy Habits:     Getting at least 3 servings of Calcium per day:  Yes    Bi-annual eye exam:  Yes    Dental care twice a year:  Yes    Sleep apnea or symptoms of sleep apnea:  None    Diet:  Vegetarian/vegan    Frequency of exercise:  4-5 days/week    Duration of exercise:  30-45 minutes    Taking medications regularly:  No    PHQ-2 Total Score: 0    Additional concerns today:  Yes      Menopause -   Last period 7/5/2020  Having daytime and night time hot flashes -   Interested in treating them    -------------------------------------    Today's PHQ-2 Score:   PHQ-2 ( 1999 [employer]) 9/21/2021   Q1: Little interest or pleasure in doing things 0   Q2: Feeling down, depressed or hopeless 0   PHQ-2 Score 0   Q1: Little interest or pleasure in doing things Not at all   Q2: Feeling down, depressed or hopeless Not at all   PHQ-2 Score 0       Abuse: Current or Past (Physical, Sexual or Emotional) - No  Do you feel safe in your environment? Yes        Social History     Tobacco Use     Smoking status: Never Smoker     Smokeless tobacco: Never Used   Substance Use Topics     Alcohol use: Yes     Alcohol/week: 0.0 standard drinks     Comment: Occas.         Alcohol Use 9/21/2021   Prescreen: >3 drinks/day or >7 drinks/week? No   Prescreen: >3 drinks/day or >7 drinks/week? -       Reviewed orders with patient.  Reviewed health maintenance and updated orders accordingly - Yes  Patient Active Problem List   Diagnosis     Allergic rhinitis due to pollen     Family history of other cardiovascular diseases     Low back pain     Hypertriglyceridemia     CARDIOVASCULAR SCREENING; LDL GOAL LESS THAN 160     Intermittent asthma     Insomnia     SVT (supraventricular tachycardia) (H)     Family history of hemochromatosis     Hashimoto's  thyroiditis     Thyroid enlargement     Thyroid nodule     ACP (advance care planning)     Past Surgical History:   Procedure Laterality Date     C APPENDECTOMY  4/1989     ENT SURGERY  1990    2 surgeries for deviated septum     SURGICAL HISTORY OF -   2000    wisdom teeth extraction     ZZC NONSPECIFIC PROCEDURE  6/1990    Nasal surgery for deviated septum     ZZC NONSPECIFIC PROCEDURE  11/1990    Nasal Surgery for deviated septum       Social History     Tobacco Use     Smoking status: Never Smoker     Smokeless tobacco: Never Used   Substance Use Topics     Alcohol use: Yes     Alcohol/week: 0.0 standard drinks     Comment: Occas.     Family History   Problem Relation Age of Onset     Diabetes Mother         Type 2     Arthritis Mother      Depression Mother         anxiety/panic attacks as well     Obesity Mother      Thyroid Disease Mother         dx ~35yrs     Hypertension Mother      Hyperlipidemia Mother      Skin Cancer Mother      C.A.D. Father         MI at age 54     Gastrointestinal Disease Father         Ulcer H-Pylori?     Lipids Father      Hypertension Father      Hyperlipidemia Father      Genetic Disorder Father      Arthritis Maternal Grandmother      C.A.D. Maternal Grandfather         Artherosclerosis- cause of death, unsure if previous MI     Diabetes Maternal Grandfather         Type 2     Eye Disorder Maternal Grandfather         Glaucoma     Arthritis Sister      Depression Sister      Neurologic Disorder Sister         Migraines     Lipids Sister      Cerebrovascular Disease No family hx of      Breast Cancer No family hx of         Mother had biopsy 2016, found non-malignant     Cancer - colorectal No family hx of            Breast Cancer Screening:    Breast CA Risk Assessment (FHS-7) 9/21/2021   Do you have a family history of breast, colon, or ovarian cancer? No / Unknown           Pertinent mammograms are reviewed under the imaging tab.    History of abnormal Pap smear: NO - age  "30-65 PAP every 5 years with negative HPV co-testing recommended  PAP / HPV Latest Ref Rng & Units 6/20/2018 5/16/2014 3/22/2011   PAP (Historical) - NIL NIL NIL   HPV16 NEG:Negative Negative - -   HPV18 NEG:Negative Negative - -   HRHPV NEG:Negative Negative - -     Reviewed and updated as needed this visit by clinical staff  Tobacco  Allergies  Meds   Med Hx  Surg Hx  Fam Hx  Soc Hx        Reviewed and updated as needed this visit by Provider                    Review of Systems   Constitutional: Negative for chills and fever.   HENT: Negative for congestion, ear pain, hearing loss and sore throat.    Eyes: Negative for pain and visual disturbance.   Respiratory: Negative for cough and shortness of breath.    Cardiovascular: Negative for chest pain and palpitations.   Gastrointestinal: Negative for abdominal pain, constipation, diarrhea and nausea.   Genitourinary: Negative for dysuria, frequency, genital sores, hematuria and urgency.   Musculoskeletal: Negative for arthralgias, joint swelling and myalgias.   Skin: Negative for rash.   Neurological: Negative for dizziness, weakness and headaches.   Psychiatric/Behavioral: The patient is not nervous/anxious.           OBJECTIVE:   /76   Pulse 75   Temp 97.6  F (36.4  C)   Ht 1.791 m (5' 10.5\")   Wt 79.1 kg (174 lb 6.4 oz)   SpO2 100%   BMI 24.67 kg/m    Physical Exam  GENERAL APPEARANCE: healthy, alert and no distress  EYES: Eyes grossly normal to inspection, PERRL and conjunctivae and sclerae normal  HENT: ear canals and TM's normal, nose and mouth without ulcers or lesions, oropharynx clear and oral mucous membranes moist  NECK: no adenopathy, no asymmetry, masses, or scars and thyroid slightly enlarged on the right -   RESP: lungs clear to auscultation - no rales, rhonchi or wheezes  BREAST: normal without masses, tenderness or nipple discharge and no palpable axillary masses or adenopathy  CV: regular rate and rhythm, normal S1 S2, no S3 or " "S4, no murmur, click or rub, no peripheral edema and peripheral pulses strong  ABDOMEN: soft, nontender, no hepatosplenomegaly, no masses and bowel sounds normal  MS: no musculoskeletal defects are noted and gait is age appropriate without ataxia  SKIN: no suspicious lesions or rashes  NEURO: Normal strength and tone, sensory exam grossly normal, mentation intact and speech normal  PSYCH: mentation appears normal and affect normal/bright    Diagnostic Test Results:  Labs reviewed in Epic    ASSESSMENT/PLAN:   (Z00.00) Routine general medical examination at a health care facility  (primary encounter diagnosis)  Comment: routine screeing  Pt will look into getting colon cancer screening - check insurance  Pap and mammo UTD  Plan: Lipid panel reflex to direct LDL Fasting,         Comprehensive metabolic panel (BMP + Alb, Alk         Phos, ALT, AST, Total. Bili, TP), Hepatitis C         Screen Reflex to HCV RNA Quant and Genotype             (M51.26) Lumbar disc herniation  Comment:    Plan: tiZANidine (ZANAFLEX) 4 MG tablet             (G47.00) Insomnia, unspecified type  Comment: worsening sleep with menopause - see below   Plan: amitriptyline (ELAVIL) 25 MG tablet             (E04.9) Thyroid enlargement  Comment:    Plan: TSH with free T4 reflex             (N95.1) Menopausal syndrome (hot flashes)  Comment: discussed options -   Will try venlafaxine once daily -  May need to titrate dose up slightly   Pt will call or RTC if symptoms worsen or do not improve.   Plan: venlafaxine (EFFEXOR-ER) 37.5 MG 24 hr tablet               Patient has been advised of split billing requirements and indicates understanding: Yes  COUNSELING:  Reviewed preventive health counseling, as reflected in patient instructions    Estimated body mass index is 24.67 kg/m  as calculated from the following:    Height as of this encounter: 1.791 m (5' 10.5\").    Weight as of this encounter: 79.1 kg (174 lb 6.4 oz).        Marjorie Wolf reports " that Marjorie Wolf has never smoked. Marjorie Wolf has never used smokeless tobacco.      Counseling Resources:  ATP IV Guidelines  Pooled Cohorts Equation Calculator  Breast Cancer Risk Calculator  BRCA-Related Cancer Risk Assessment: FHS-7 Tool  FRAX Risk Assessment  ICSI Preventive Guidelines  Dietary Guidelines for Americans, 2010  USDA's MyPlate  ASA Prophylaxis  Lung CA Screening    Jeanette Rodrigues [facility]

## 2021-09-24 ENCOUNTER — OFFICE VISIT (OUTPATIENT)
Dept: FAMILY MEDICINE | Facility: CLINIC | Age: 46
End: 2021-09-24
Payer: COMMERCIAL

## 2021-09-24 VITALS
WEIGHT: 174.4 LBS | OXYGEN SATURATION: 100 % | TEMPERATURE: 97.6 F | HEART RATE: 75 BPM | DIASTOLIC BLOOD PRESSURE: 76 MMHG | BODY MASS INDEX: 24.42 KG/M2 | HEIGHT: 71 IN | SYSTOLIC BLOOD PRESSURE: 117 MMHG

## 2021-09-24 DIAGNOSIS — E04.9 THYROID ENLARGEMENT: ICD-10-CM

## 2021-09-24 DIAGNOSIS — N95.1 MENOPAUSAL SYNDROME (HOT FLASHES): ICD-10-CM

## 2021-09-24 DIAGNOSIS — Z00.00 ROUTINE GENERAL MEDICAL EXAMINATION AT A HEALTH CARE FACILITY: Primary | ICD-10-CM

## 2021-09-24 DIAGNOSIS — M51.26 LUMBAR DISC HERNIATION: ICD-10-CM

## 2021-09-24 DIAGNOSIS — G47.00 INSOMNIA, UNSPECIFIED TYPE: ICD-10-CM

## 2021-09-24 LAB
ALBUMIN SERPL-MCNC: 4.1 G/DL (ref 3.4–5)
ALP SERPL-CCNC: 98 U/L (ref 40–150)
ALT SERPL W P-5'-P-CCNC: 28 U/L (ref 0–70)
ANION GAP SERPL CALCULATED.3IONS-SCNC: 5 MMOL/L (ref 3–14)
AST SERPL W P-5'-P-CCNC: 24 U/L (ref 0–45)
BILIRUB SERPL-MCNC: 0.6 MG/DL (ref 0.2–1.3)
BUN SERPL-MCNC: 8 MG/DL (ref 7–30)
CALCIUM SERPL-MCNC: 8.5 MG/DL (ref 8.5–10.1)
CHLORIDE BLD-SCNC: 108 MMOL/L (ref 94–109)
CHOLEST SERPL-MCNC: 224 MG/DL
CO2 SERPL-SCNC: 26 MMOL/L (ref 20–32)
CREAT SERPL-MCNC: 0.8 MG/DL (ref 0.52–1.25)
FASTING STATUS PATIENT QL REPORTED: YES
GFR SERPL CREATININE-BSD FRML MDRD: 89 ML/MIN/1.73M2
GLUCOSE BLD-MCNC: 90 MG/DL (ref 70–99)
HCV AB SERPL QL IA: NONREACTIVE
HDLC SERPL-MCNC: 70 MG/DL
LDLC SERPL CALC-MCNC: 128 MG/DL
NONHDLC SERPL-MCNC: 154 MG/DL
POTASSIUM BLD-SCNC: 3.7 MMOL/L (ref 3.4–5.3)
PROT SERPL-MCNC: 7.6 G/DL (ref 6.8–8.8)
SODIUM SERPL-SCNC: 139 MMOL/L (ref 133–144)
TRIGL SERPL-MCNC: 132 MG/DL
TSH SERPL DL<=0.005 MIU/L-ACNC: 2.94 MU/L (ref 0.4–4)

## 2021-09-24 PROCEDURE — 99214 OFFICE O/P EST MOD 30 MIN: CPT | Mod: 25 | Performed by: FAMILY MEDICINE

## 2021-09-24 PROCEDURE — 80053 COMPREHEN METABOLIC PANEL: CPT | Performed by: FAMILY MEDICINE

## 2021-09-24 PROCEDURE — 99396 PREV VISIT EST AGE 40-64: CPT | Mod: 25 | Performed by: FAMILY MEDICINE

## 2021-09-24 PROCEDURE — 80061 LIPID PANEL: CPT | Performed by: FAMILY MEDICINE

## 2021-09-24 PROCEDURE — 86803 HEPATITIS C AB TEST: CPT | Performed by: FAMILY MEDICINE

## 2021-09-24 PROCEDURE — 36415 COLL VENOUS BLD VENIPUNCTURE: CPT | Performed by: FAMILY MEDICINE

## 2021-09-24 PROCEDURE — 90686 IIV4 VACC NO PRSV 0.5 ML IM: CPT | Performed by: FAMILY MEDICINE

## 2021-09-24 PROCEDURE — 90471 IMMUNIZATION ADMIN: CPT | Performed by: FAMILY MEDICINE

## 2021-09-24 PROCEDURE — 84443 ASSAY THYROID STIM HORMONE: CPT | Performed by: FAMILY MEDICINE

## 2021-09-24 RX ORDER — VENLAFAXINE HYDROCHLORIDE 37.5 MG/1
37.5 TABLET, EXTENDED RELEASE ORAL DAILY
Qty: 30 TABLET | Refills: 1 | Status: SHIPPED | OUTPATIENT
Start: 2021-09-24 | End: 2021-10-25

## 2021-09-24 ASSESSMENT — MIFFLIN-ST. JEOR: SCORE: 1519.26

## 2021-09-25 NOTE — RESULT ENCOUNTER NOTE
Dear Marjorie,   Your test results are all back -   -Cholesterol levels (LDL,HDL, Triglycerides) are stable.  ADVISE: rechecking in 1 year.   -Liver and gallbladder tests are normal (ALT,AST, Alk phos, bilirubin), kidney function is normal (Cr, GFR), sodium is normal, potassium is normal, calcium is normal, glucose is normal.  -TSH (thyroid stimulating hormone) level is normal which indicates normal thyroid function.  -Hepatitis C antibody screen test shows no signs of a previous hepatitis C infection.  Let us know if you have any questions.  -Jeanette Rodrigues, DO

## 2021-10-22 ENCOUNTER — MYC MEDICAL ADVICE (OUTPATIENT)
Dept: FAMILY MEDICINE | Facility: CLINIC | Age: 46
End: 2021-10-22

## 2021-10-22 DIAGNOSIS — N95.1 MENOPAUSAL SYNDROME (HOT FLASHES): ICD-10-CM

## 2021-10-25 ENCOUNTER — MYC MEDICAL ADVICE (OUTPATIENT)
Dept: FAMILY MEDICINE | Facility: CLINIC | Age: 46
End: 2021-10-25

## 2021-10-25 DIAGNOSIS — N95.1 MENOPAUSAL SYNDROME (HOT FLASHES): Primary | ICD-10-CM

## 2021-10-25 RX ORDER — VENLAFAXINE HYDROCHLORIDE 37.5 MG/1
37.5 TABLET, EXTENDED RELEASE ORAL DAILY
Qty: 30 TABLET | Refills: 0 | Status: SHIPPED | OUTPATIENT
Start: 2021-10-25 | End: 2021-10-29

## 2021-10-29 RX ORDER — VENLAFAXINE HYDROCHLORIDE 37.5 MG/1
37.5 CAPSULE, EXTENDED RELEASE ORAL DAILY
Qty: 30 CAPSULE | Refills: 1 | Status: SHIPPED | OUTPATIENT
Start: 2021-10-29 | End: 2021-12-16

## 2021-11-17 ENCOUNTER — E-VISIT (OUTPATIENT)
Dept: FAMILY MEDICINE | Facility: CLINIC | Age: 46
End: 2021-11-17

## 2021-11-17 ENCOUNTER — E-VISIT (OUTPATIENT)
Dept: FAMILY MEDICINE | Facility: CLINIC | Age: 46
End: 2021-11-17
Payer: COMMERCIAL

## 2021-11-17 ENCOUNTER — MYC MEDICAL ADVICE (OUTPATIENT)
Dept: FAMILY MEDICINE | Facility: CLINIC | Age: 46
End: 2021-11-17
Payer: COMMERCIAL

## 2021-11-17 DIAGNOSIS — H10.31 ACUTE BACTERIAL CONJUNCTIVITIS OF RIGHT EYE: Primary | ICD-10-CM

## 2021-11-17 DIAGNOSIS — Z53.9 ERRONEOUS ENCOUNTER--DISREGARD: Primary | ICD-10-CM

## 2021-11-17 PROCEDURE — 99421 OL DIG E/M SVC 5-10 MIN: CPT | Performed by: FAMILY MEDICINE

## 2021-11-17 RX ORDER — CIPROFLOXACIN HYDROCHLORIDE 3.5 MG/ML
1-2 SOLUTION/ DROPS TOPICAL EVERY 4 HOURS
Qty: 5 ML | Refills: 0 | Status: SHIPPED | OUTPATIENT
Start: 2021-11-17 | End: 2022-06-15

## 2021-11-22 ENCOUNTER — ANCILLARY PROCEDURE (OUTPATIENT)
Dept: GENERAL RADIOLOGY | Facility: CLINIC | Age: 46
End: 2021-11-22
Attending: NURSE PRACTITIONER
Payer: COMMERCIAL

## 2021-11-22 ENCOUNTER — OFFICE VISIT (OUTPATIENT)
Dept: FAMILY MEDICINE | Facility: CLINIC | Age: 46
End: 2021-11-22
Payer: COMMERCIAL

## 2021-11-22 VITALS
DIASTOLIC BLOOD PRESSURE: 80 MMHG | BODY MASS INDEX: 25.17 KG/M2 | TEMPERATURE: 98.1 F | SYSTOLIC BLOOD PRESSURE: 126 MMHG | HEIGHT: 71 IN | RESPIRATION RATE: 16 BRPM | OXYGEN SATURATION: 100 % | WEIGHT: 179.8 LBS | HEART RATE: 69 BPM

## 2021-11-22 DIAGNOSIS — M79.675 PAIN OF TOE OF LEFT FOOT: ICD-10-CM

## 2021-11-22 DIAGNOSIS — S92.425A CLOSED NONDISPLACED FRACTURE OF DISTAL PHALANX OF LEFT GREAT TOE, INITIAL ENCOUNTER: ICD-10-CM

## 2021-11-22 DIAGNOSIS — I47.10 SVT (SUPRAVENTRICULAR TACHYCARDIA) (H): ICD-10-CM

## 2021-11-22 DIAGNOSIS — M79.675 PAIN OF TOE OF LEFT FOOT: Primary | ICD-10-CM

## 2021-11-22 PROCEDURE — 73660 X-RAY EXAM OF TOE(S): CPT | Mod: LT | Performed by: RADIOLOGY

## 2021-11-22 PROCEDURE — 99213 OFFICE O/P EST LOW 20 MIN: CPT | Performed by: NURSE PRACTITIONER

## 2021-11-22 ASSESSMENT — PAIN SCALES - GENERAL: PAINLEVEL: EXTREME PAIN (8)

## 2021-11-22 ASSESSMENT — MIFFLIN-ST. JEOR: SCORE: 1543.76

## 2021-11-22 NOTE — PROGRESS NOTES
Assessment & Plan     Pain of toe of left foot and  Closed nondisplaced fracture of distal phalanx of left great toe, initial encounter    - XR Toe Left G/E 2 Views; Future  - Miscellaneous Order for DME - ONLY FOR DME  Recommend using post op stiff soled shoe for immobilization/treatment  Okay to use NSAIDs as needed for pain    SVT  Known issue that I take into account for their medical decisions, no current exacerbations or new concerns.                   Return in about 4 weeks (around 12/20/2021) for follow up toe fracture and x-ray.    Haven Gutierrez NP  [facility]    Fredrick Amador is a 46 year old who presents for the following health issues     History of Present Illness       Marjorie Wolf eats 4 or more servings of fruits and vegetables daily.Marjorie Wolf consumes 0 sweetened beverage(s) daily.Marjorie Wolf exercises with enough effort to increase Majrorie Wolf's heart rate 30 to 60 minutes per day.  Marjorie Wolf exercises with enough effort to increase Marjorie Wolf's heart rate 5 days per week.   Marjorie Wolf is taking medications regularly.       Concern - Bruise to left big toe   Onset: 4 days   Description: bruising left great digit and up foot   Intensity: severe  Progression of Symptoms:  same and constant  Accompanying Signs & Symptoms: swelling to top of toe   Previous history of similar problem: none  Precipitating factors:        Worsened by: walking or pressure   Alleviating factors:        Improved by: icing   Therapies tried and outcome: had meloxicam, has taken 1 daily since Friday, but not today     She feel 3 days ago and injured her left great toe.  It is bruised, painful, and swollen.    Review of Systems   Constitutional, HEENT, cardiovascular, pulmonary, musculoskeletal and gu systems are negative, except as otherwise noted.      Objective    /80 (BP Location: Right arm)   Pulse 69   Temp 98.1  F  "(36.7  C) (Oral)   Resp 16   Ht 1.791 m (5' 10.5\")   Wt 81.6 kg (179 lb 12.8 oz)   LMP 07/05/2020 (Exact Date)   SpO2 100%   BMI 25.43 kg/m    Body mass index is 25.43 kg/m .  Physical Exam   GENERAL: healthy, alert and no distress  MS: left great toe with swelling and bruising, tenderness to great toe.  No tenderness to metatarsals or the lesser toes  PSYCH: mentation appears normal, affect normal/bright    XR Toe Left:  There is a fracture of the proximal end of the distal phalanx of the first digit per my reading.  Await radiology reading            "

## 2021-11-22 NOTE — PATIENT INSTRUCTIONS
Patient Education     Closed Toe Fracture  Your toe is broken (fractured). This causes pain, swelling, and sometimes bruising. This injury usually takes about 4 to 6 weeks to heal, but can sometimes take longer. Toe injuries are often treated by taping the injured toe to the next one (buddy taping). Or you may have a hard shoe, splint, or cast. These protect the injured toe and hold it in position.   If the toenail has been severely injured, it may fall off in 1 to 2 weeks. It takes up to 12 months for a new toenail to grow back.   Home care  Follow these guidelines when caring for yourself at home:    You may be given a cast shoe to wear to keep your toe from moving. If not, you can use a sandal or any shoe that doesn t put pressure on the injured toe until the swelling and pain go away. If using a sandal, be careful not to strike your foot against anything. Another injury could make the fracture worse. If you were given crutches, don t put full weight on the injured foot until you can do so without pain, or as directed by your healthcare provider.    Keep your foot elevated to reduce pain and swelling. When sleeping, put a pillow under the injured leg. When sitting, support the injured leg so it is above your waist. This is very important during the first 2 days (48 hours).    Put an ice pack on the injured area. Do this for 20 minutes every 1 to 2 hours the first day for pain relief. You can make an ice pack by wrapping a plastic bag of ice cubes in a thin towel. As the ice melts, be careful that any cloth or paper tape doesn t get wet. Continue using the ice pack 3 to 4 times a day for the next 2 days. Then use the ice pack as needed to ease pain and swelling.    If buddy tape was used and it becomes wet or dirty, change it. You may replace it with paper, plastic, or cloth tape. Cloth tape and paper tapes must be kept dry.    You may use acetaminophen or ibuprofen to control pain, unless another pain medicine  was prescribed. If you have chronic liver or kidney disease, talk with your healthcare provider before using these medicines. Also talk with your provider if you ve had a stomach ulcer or gastrointestinal bleeding.    You may return to sports or physical education activities after 4 weeks when you can run without pain, or as directed by your healthcare provider.  Follow-up care  Follow up with your healthcare provider or as advised. This is to make sure the bone is healing the way it should.   X-rays may be taken. You will be told of any new findings that may affect your care.  When to seek medical advice  Call your healthcare provider right away if any of these occur:    Pain or swelling gets worse    The cast/splint cracks    The cast and padding get wet and stays wet more than 24 hours    Bad odor from the cast/splint or wound fluid stains the cast    Tightness or pressure under the cast/splint gets worse    Toe becomes cold, blue, numb, or tingly    You can t move the toe    Signs of infection: fever, redness, warmth, swelling, or drainage from the wound or cast    Fever of 100.4 F (38 C) or higher, or as directed by your healthcare provider    Esther León last reviewed this educational content on 2/1/2020 2000-2021 [employer]. All rights reserved. This information is not intended as a substitute for professional medical care. Always follow your healthcare professional's instructions.

## 2021-11-23 ASSESSMENT — ASTHMA QUESTIONNAIRES: ACT_TOTALSCORE: 24

## 2021-12-17 ENCOUNTER — OFFICE VISIT (OUTPATIENT)
Dept: FAMILY MEDICINE | Facility: CLINIC | Age: 46
End: 2021-12-17
Payer: COMMERCIAL

## 2021-12-17 ENCOUNTER — ANCILLARY PROCEDURE (OUTPATIENT)
Dept: GENERAL RADIOLOGY | Facility: CLINIC | Age: 46
End: 2021-12-17
Attending: STUDENT IN AN ORGANIZED HEALTH CARE EDUCATION/TRAINING PROGRAM
Payer: COMMERCIAL

## 2021-12-17 VITALS
DIASTOLIC BLOOD PRESSURE: 70 MMHG | OXYGEN SATURATION: 98 % | BODY MASS INDEX: 25.26 KG/M2 | TEMPERATURE: 97.1 F | WEIGHT: 180.4 LBS | RESPIRATION RATE: 12 BRPM | SYSTOLIC BLOOD PRESSURE: 118 MMHG | HEIGHT: 71 IN | HEART RATE: 78 BPM

## 2021-12-17 DIAGNOSIS — S92.425A CLOSED NONDISPLACED FRACTURE OF DISTAL PHALANX OF LEFT GREAT TOE, INITIAL ENCOUNTER: ICD-10-CM

## 2021-12-17 DIAGNOSIS — S92.425D CLOSED NONDISPLACED FRACTURE OF DISTAL PHALANX OF LEFT GREAT TOE WITH ROUTINE HEALING, SUBSEQUENT ENCOUNTER: Primary | ICD-10-CM

## 2021-12-17 PROCEDURE — 73660 X-RAY EXAM OF TOE(S): CPT | Mod: LT | Performed by: RADIOLOGY

## 2021-12-17 PROCEDURE — 99213 OFFICE O/P EST LOW 20 MIN: CPT | Performed by: STUDENT IN AN ORGANIZED HEALTH CARE EDUCATION/TRAINING PROGRAM

## 2021-12-17 ASSESSMENT — PAIN SCALES - GENERAL: PAINLEVEL: NO PAIN (0)

## 2021-12-17 ASSESSMENT — MIFFLIN-ST. JEOR: SCORE: 1546.48

## 2021-12-17 NOTE — PROGRESS NOTES
Assessment & Plan     Closed nondisplaced fracture of distal phalanx of left great toe with routine healing, subsequent encounter  Reviewed x-rays with the patient. There is some callus formation over the fracture line. Good alignment of the fracture, no displacement. Will await radiology read. Recommend since her pain has improved and she is 4 weeks out from initial diagnosis, she can gradually start to decrease her use of the boot and use supportive shoes as tolerated. If pain increases return to using the boot. Discussed no running until at least 6 weeks and only if tolerated.  - XR Toe Left G/E 2 Views; Future    Return if symptoms worsen or fail to improve.    [facility]    Fredrick Amador is a 46 year old who presents for the following health issues     HPI     Concern - Recheck Broken Left Big Toe  Onset: 1 month ago  Description: patient is following up on broken left big toe.   Intensity: mild  Progression of Symptoms:  improving  Accompanying Signs & Symptoms: pain if putting pressure on foot  Previous history of similar problem: none  Precipitating factors:        Worsened by: pressure/weight on foot  Alleviating factors:        Improved by: none  Therapies tried and outcome:  none       Patient is follow up on toe fracture. She had no significant injury that resulted in this fracture, she had just tripped on her foot at home. She has been recovering well and denies much pain now. She has been wearing the walking shoe consistently and denies any pain with use of the shoe. There have been a couple times she has used regular boots that have been supportive to walking through snow and has not been with this either. She has continued to do some biking this past month without pain. She is eager to get back to running. no further bruising or swelling.      Review of Systems   Constitutional, HEENT, cardiovascular, pulmonary, gi and gu systems are negative,  "except as otherwise noted.      Objective    /70 (BP Location: Right arm, Patient Position: Chair, Cuff Size: Adult Regular)   Pulse 78   Temp 97.1  F (36.2  C) (Tympanic)   Resp 12   Ht 1.791 m (5' 10.5\")   Wt 81.8 kg (180 lb 6.4 oz)   LMP 07/05/2020 (Exact Date)   SpO2 98%   BMI 25.52 kg/m    Body mass index is 25.52 kg/m .  Physical Exam   GENERAL: healthy, alert and no distress  MS: no gross musculoskeletal defects noted except for bruising under the 1st toenail. Mild tenderness of the DIP joint on the left 1st toe and minimal pain with full flexion of the 1st toe DIP joint. no edema  NEURO: Normal strength and tone, mentation intact and speech normal  PSYCH: mentation appears normal, affect normal/bright    Xray - Reviewed and interpreted by me.  Fracture of the left 1st distal phalynx with routine healing noted, in alignment.             "

## 2021-12-17 NOTE — PATIENT INSTRUCTIONS
Your Xrays show improvement. Since you've been doing well and are relatively asymptomatic, then you can start increasing your time outside of the boot. Wear supportive shoes. You can gradually start increasing your activity level, although recommend no running until at least 6 weeks and your pain is absent/minimal with increased activities of walking/swimming. Use tylenol or ibuprofen as needed and ice if you have increased pain/swelling with increasing your activities.       Patient Education     Closed Toe Fracture  Your toe is broken (fractured). This causes pain, swelling, and sometimes bruising. This injury usually takes about 4 to 6 weeks to heal, but can sometimes take longer. Toe injuries are often treated by taping the injured toe to the next one (buddy taping). Or you may have a hard shoe, splint, or cast. These protect the injured toe and hold it in position.   If the toenail has been severely injured, it may fall off in 1 to 2 weeks. It takes up to 12 months for a new toenail to grow back.   Home care  Follow these guidelines when caring for yourself at home:    You may be given a cast shoe to wear to keep your toe from moving. If not, you can use a sandal or any shoe that doesn t put pressure on the injured toe until the swelling and pain go away. If using a sandal, be careful not to strike your foot against anything. Another injury could make the fracture worse. If you were given crutches, don t put full weight on the injured foot until you can do so without pain, or as directed by your healthcare provider.    Keep your foot elevated to reduce pain and swelling. When sleeping, put a pillow under the injured leg. When sitting, support the injured leg so it is above your waist. This is very important during the first 2 days (48 hours).    Put an ice pack on the injured area. Do this for 20 minutes every 1 to 2 hours the first day for pain relief. You can make an ice pack by wrapping a plastic bag of ice  cubes in a thin towel. As the ice melts, be careful that any cloth or paper tape doesn t get wet. Continue using the ice pack 3 to 4 times a day for the next 2 days. Then use the ice pack as needed to ease pain and swelling.    If buddy tape was used and it becomes wet or dirty, change it. You may replace it with paper, plastic, or cloth tape. Cloth tape and paper tapes must be kept dry.    You may use acetaminophen or ibuprofen to control pain, unless another pain medicine was prescribed. If you have chronic liver or kidney disease, talk with your healthcare provider before using these medicines. Also talk with your provider if you ve had a stomach ulcer or gastrointestinal bleeding.    You may return to sports or physical education activities after 4 weeks when you can run without pain, or as directed by your healthcare provider.  Follow-up care  Follow up with your healthcare provider or as advised. This is to make sure the bone is healing the way it should.   X-rays may be taken. You will be told of any new findings that may affect your care.  When to seek medical advice  Call your healthcare provider right away if any of these occur:    Pain or swelling gets worse    The cast/splint cracks    The cast and padding get wet and stays wet more than 24 hours    Bad odor from the cast/splint or wound fluid stains the cast    Tightness or pressure under the cast/splint gets worse    Toe becomes cold, blue, numb, or tingly    You can t move the toe    Signs of infection: fever, redness, warmth, swelling, or drainage from the wound or cast    Fever of 100.4 F (38 C) or higher, or as directed by your healthcare provider    Esther León last reviewed this educational content on 2/1/2020 2000-2021 [employer]. All rights reserved. This information is not intended as a substitute for professional medical care. Always follow your healthcare professional's instructions.

## 2022-01-21 ENCOUNTER — MYC MEDICAL ADVICE (OUTPATIENT)
Dept: FAMILY MEDICINE | Facility: CLINIC | Age: 47
End: 2022-01-21
Payer: COMMERCIAL

## 2022-01-21 NOTE — TELEPHONE ENCOUNTER
PN,    Please see [employer] message.    Patient is aware you are out of the office until 1/25.    Thanks,  Adriana Barr RN

## 2022-05-04 ENCOUNTER — MYC REFILL (OUTPATIENT)
Dept: ORTHOPEDICS | Facility: CLINIC | Age: 47
End: 2022-05-04

## 2022-05-04 ENCOUNTER — MYC MEDICAL ADVICE (OUTPATIENT)
Dept: FAMILY MEDICINE | Facility: CLINIC | Age: 47
End: 2022-05-04
Payer: COMMERCIAL

## 2022-05-04 DIAGNOSIS — Z12.83 SCREENING FOR SKIN CANCER: Primary | ICD-10-CM

## 2022-05-04 DIAGNOSIS — M54.40 BILATERAL LOW BACK PAIN WITH SCIATICA, SCIATICA LATERALITY UNSPECIFIED, UNSPECIFIED CHRONICITY: ICD-10-CM

## 2022-05-04 DIAGNOSIS — M51.16 LUMBAR DISC HERNIATION WITH RADICULOPATHY: ICD-10-CM

## 2022-05-05 RX ORDER — MELOXICAM 15 MG/1
15 TABLET ORAL DAILY
Qty: 30 TABLET | Refills: 0 | Status: CANCELLED | OUTPATIENT
Start: 2022-05-05

## 2022-05-05 NOTE — TELEPHONE ENCOUNTER
PN,    Please see [employer] message.    Note from 5/13/21 Derm visit:  Follow up in yearly [facility]    Order T'd up.  Thanks,  Adriana Barr RN

## 2022-05-05 NOTE — TELEPHONE ENCOUNTER
5/5 Called and left voicemail. Provided phone number [phone] to schedule follow up with Dr. Perez for medication refill.     Lamar gonzales Procedure   Orthopedics, Podiatry, Sports Medicine, ENT/Eye Specialties  [facility]   [phone]

## 2022-06-02 ENCOUNTER — OFFICE VISIT (OUTPATIENT)
Dept: ORTHOPEDICS | Facility: CLINIC | Age: 47
End: 2022-06-02
Payer: COMMERCIAL

## 2022-06-02 VITALS — WEIGHT: 180 LBS | BODY MASS INDEX: 25.2 KG/M2 | HEIGHT: 71 IN

## 2022-06-02 DIAGNOSIS — M51.16 LUMBAR DISC HERNIATION WITH RADICULOPATHY: Primary | ICD-10-CM

## 2022-06-02 PROCEDURE — 99214 OFFICE O/P EST MOD 30 MIN: CPT | Performed by: PREVENTIVE MEDICINE

## 2022-06-02 RX ORDER — MELOXICAM 15 MG/1
15 TABLET ORAL DAILY
Qty: 90 TABLET | Refills: 0 | Status: SHIPPED | OUTPATIENT
Start: 2022-06-02 | End: 2022-09-01

## 2022-06-02 NOTE — PROGRESS NOTES
HISTORY OF PRESENT ILLNESS    Luciano is a pleasant 47 year old year old adult who presents to clinic today with   Marjorie explains that her exercises and medication has controlled her lumbar radicular symptoms  No need for injection at this time  Wants to refill medications and wait and see  Location: low back, legs  Quality:  achy pain    Duration: over a year  Timing: occurs intermittently  Context: occurs while exercising and lifting  Modifying factors:  resting and non-use makes it better, movement and use makes it worse  Associated signs & symptoms: radiation into leg occasionally from low back pain    MEDICAL HISTORY  Patient Active Problem List   Diagnosis     Allergic rhinitis due to pollen     Family history of other cardiovascular diseases     Low back pain     Hypertriglyceridemia     CARDIOVASCULAR SCREENING; LDL GOAL LESS THAN 160     Intermittent asthma     Insomnia     SVT (supraventricular tachycardia) (H)     Family history of hemochromatosis     Hashimoto's thyroiditis     Thyroid enlargement     Thyroid nodule     ACP (advance care planning)       Current Outpatient Medications   Medication Sig Dispense Refill     amitriptyline (ELAVIL) 25 MG tablet TAKE 3 TABLETS BY MOUTH NIGHTLY AS NEEDED FOR SLEEP 270 tablet 3     Lactase (LACTAID PO)        levalbuterol (XOPENEX HFA) 45 MCG/ACT inhaler Inhale 2 puffs into the lungs every 4 hours as needed for shortness of breath / dyspnea or wheezing 15 g 2     meloxicam (MOBIC) 15 MG tablet TAKE 1 TABLET BY MOUTH EVERY DAY 30 tablet 0     tiZANidine (ZANAFLEX) 4 MG tablet Take 1-2 tablets (4-8 mg) by mouth nightly as needed for muscle spasms 180 tablet 3     UNKNOWN MED DOSAGE digestive advantage for lactose intolorance - 1 tab daily       venlafaxine (EFFEXOR-XR) 37.5 MG 24 hr capsule TAKE 1 CAPSULE BY MOUTH EVERY DAY 90 capsule 1     VITAMIN D, CHOLECALCIFEROL, PO Take by mouth daily        ciprofloxacin (CILOXAN) 0.3 % ophthalmic solution Place 1-2 drops  into the right eye every 4 hours 5 mL 0       Allergies   Allergen Reactions     Erythromycin Rash     Penicillins Rash     Sulfa Drugs Rash       Family History   Problem Relation Age of Onset     Diabetes Mother         Type 2     Arthritis Mother      Depression Mother         anxiety/panic attacks as well     Obesity Mother      Thyroid Disease Mother         dx ~35yrs     Hypertension Mother      Hyperlipidemia Mother      Skin Cancer Mother      C.A.D. Father         MI at age 54     Gastrointestinal Disease Father         Ulcer H-Pylori?     Lipids Father      Hypertension Father      Hyperlipidemia Father      Genetic Disorder Father      Arthritis Maternal Grandmother      C.A.D. Maternal Grandfather         Artherosclerosis- cause of death, unsure if previous MI     Diabetes Maternal Grandfather         Type 2     Eye Disorder Maternal Grandfather         Glaucoma     Arthritis Sister      Depression Sister      Neurologic Disorder Sister         Migraines     Lipids Sister      Cerebrovascular Disease No family hx of      Breast Cancer No family hx of         Mother had biopsy 2016, found non-malignant     Cancer - colorectal No family hx of      Social History     Socioeconomic History     Marital status:      Spouse name: Maddie Renee     Number of children: 0     Years of education: Ph.D   Occupational History     Occupation:      Employer: [email or web address]     Comment: Sociology   Tobacco Use     Smoking status: Never Smoker     Smokeless tobacco: Never Used   Substance and Sexual Activity     Alcohol use: Yes     Alcohol/week: 0.0 standard drinks     Comment: Occas.     Drug use: No     Sexual activity: Yes     Partners: Female     Birth control/protection: None     Comment: stable relationship   Other Topics Concern     Parent/sibling w/ CABG, MI or angioplasty before 65F 55M? Yes   Social History Narrative    Social Documentation:3/19/10        Balanced Diet: YES    Calcium  "intake: more than 2 per day    Caffeine: 1-2 cups per day    Exercise:  type of activity gym;  4 times per week    Sunscreen: Yes    Seatbelts:  Yes    Self Breast Exam:  No    Self Testicular Exam: n/a    Physical/Emotional/Sexual Abuse: No    Do you feel safe in your environment? Yes        Cholesterol screen up to date: Lab Test   3/4/09    2/2/07                  0934      0911         CHOL       194       179          HDL        36*       51           LDL        107       103          TRIG       256*      124          CHOLHDLRA* 5.5*      3.5              Eye Exam up to date: No    Dental Exam up to date: Yes    Pap smear up to date: PAP      NIL   3/4/2009    Mammogram up to date: Does Not Apply    Dexa Scan up to date: Does Not Apply    Colonoscopy up to date: Does Not Apply    Immunizations up to date: Yes last td 2005    Glucose screen if over 40:  Yes        Kacy Mccabe CMA                            Additional medical/Social/Surgical histories reviewed in [facility] and updated as appropriate.     REVIEW OF SYSTEMS (6/2/2022)  10 point ROS of systems including Constitutional, Eyes, Respiratory, Cardiovascular, Gastroenterology, Genitourinary, Integumentary, Musculoskeletal, Psychiatric, Allergic/Immunologic were all negative except for pertinent positives noted in my HPI.     PHYSICAL EXAM  Vitals:    06/02/22 1440   Weight: 81.6 kg (180 lb)   Height: 1.791 m (5' 10.5\")     Vital Signs: Ht 1.791 m (5' 10.5\")   Wt 81.6 kg (180 lb)   LMP 07/05/2020 (Exact Date)   BMI 25.46 kg/m   Patient declined being weighed. Body mass index is 25.46 kg/m .    General  - normal appearance, in no obvious distress  HEENT  - conjunctivae not injected, moist mucous membranes, normocephalic/atraumatic head, ears normal appearance, no lesions, mouth normal appearance, no scars, normal dentition and teeth present  CV  - normal peripheral perfusion  Pulm  - normal respiratory pattern, non-labored  Musculoskeletal - lumbar " spine  - stance: normal gait without limp, no obvious leg length discrepancy, normal heel and toe walk  - inspection: normal bone and joint alignment, no obvious scoliosis  - palpation: no paravertebral or bony tenderness  - ROM: flexion slightly exacerbates pain, normal extension, sidebending, rotation  - strength: lower extremities 5/5 in all planes  - special tests:  (-) straight leg raise  (-) slump test  Neuro  - patellar and Achilles DTRs 2+ bilaterally, no lower extremity sensory deficit throughout L5 distribution, grossly normal coordination, normal muscle tone  Skin  - no ecchymosis, erythema, warmth, or induration, no obvious rash  Psych  - interactive, appropriate, normal mood and affect  ASSESSMENT & PLAN  48 yo female with lumbar radicular pain, disc ddd, controlled, not resolved    I independently reviewed the following imaging studies:  Lumbar MRI: shows ddd, disc herniation  Consider repeat KENNEDY at patient's request  Refilled medication: mobic PRN  Cont. HEP  And PT   F/u PRN  Appropriate PPE was utilized for prevention of spread of Covid-19.  Lucien Perez MD, CAM

## 2022-06-02 NOTE — LETTER
6/2/2022      RE: Marjorie Wolf  [address] [facility] [address]     Dear Colleague,    Thank you for referring your patient, Marjorie Wolf, to the [facility] [place]. Please see a copy of my visit note below.    HISTORY OF PRESENT ILLNESS    Luciano is a pleasant 47 year old year old adult who presents to clinic today with   Marjorie explains that her exercises and medication has controlled her lumbar radicular symptoms  No need for injection at this time  Wants to refill medications and wait and see  Location: low back, legs  Quality:  achy pain    Duration: over a year  Timing: occurs intermittently  Context: occurs while exercising and lifting  Modifying factors:  resting and non-use makes it better, movement and use makes it worse  Associated signs & symptoms: radiation into leg occasionally from low back pain    MEDICAL HISTORY  Patient Active Problem List   Diagnosis     Allergic rhinitis due to pollen     Family history of other cardiovascular diseases     Low back pain     Hypertriglyceridemia     CARDIOVASCULAR SCREENING; LDL GOAL LESS THAN 160     Intermittent asthma     Insomnia     SVT (supraventricular tachycardia) (H)     Family history of hemochromatosis     Hashimoto's thyroiditis     Thyroid enlargement     Thyroid nodule     ACP (advance care planning)       Current Outpatient Medications   Medication Sig Dispense Refill     amitriptyline (ELAVIL) 25 MG tablet TAKE 3 TABLETS BY MOUTH NIGHTLY AS NEEDED FOR SLEEP 270 tablet 3     Lactase (LACTAID PO)        levalbuterol (XOPENEX HFA) 45 MCG/ACT inhaler Inhale 2 puffs into the lungs every 4 hours as needed for shortness of breath / dyspnea or wheezing 15 g 2     meloxicam (MOBIC) 15 MG tablet TAKE 1 TABLET BY MOUTH EVERY DAY 30 tablet 0     tiZANidine (ZANAFLEX) 4 MG tablet Take 1-2 tablets (4-8 mg) by mouth nightly as needed for muscle spasms 180 tablet 3     UNKNOWN MED DOSAGE digestive  advantage for lactose intolorance - 1 tab daily       venlafaxine (EFFEXOR-XR) 37.5 MG 24 hr capsule TAKE 1 CAPSULE BY MOUTH EVERY DAY 90 capsule 1     VITAMIN D, CHOLECALCIFEROL, PO Take by mouth daily        ciprofloxacin (CILOXAN) 0.3 % ophthalmic solution Place 1-2 drops into the right eye every 4 hours 5 mL 0       Allergies   Allergen Reactions     Erythromycin Rash     Penicillins Rash     Sulfa Drugs Rash       Family History   Problem Relation Age of Onset     Diabetes Mother         Type 2     Arthritis Mother      Depression Mother         anxiety/panic attacks as well     Obesity Mother      Thyroid Disease Mother         dx ~35yrs     Hypertension Mother      Hyperlipidemia Mother      Skin Cancer Mother      C.A.D. Father         MI at age 54     Gastrointestinal Disease Father         Ulcer H-Pylori?     Lipids Father      Hypertension Father      Hyperlipidemia Father      Genetic Disorder Father      Arthritis Maternal Grandmother      C.A.D. Maternal Grandfather         Artherosclerosis- cause of death, unsure if previous MI     Diabetes Maternal Grandfather         Type 2     Eye Disorder Maternal Grandfather         Glaucoma     Arthritis Sister      Depression Sister      Neurologic Disorder Sister         Migraines     Lipids Sister      Cerebrovascular Disease No family hx of      Breast Cancer No family hx of         Mother had biopsy 2016, found non-malignant     Cancer - colorectal No family hx of      Social History     Socioeconomic History     Marital status:      Spouse name: Maddie Renee     Number of children: 0     Years of education: Ph.D   Occupational History     Occupation:      Employer: [employer]     Comment: Sociology   Tobacco Use     Smoking status: Never Smoker     Smokeless tobacco: Never Used   Substance and Sexual Activity     Alcohol use: Yes     Alcohol/week: 0.0 standard drinks     Comment: Occas.     Drug use: No     Sexual activity: Yes  "    Partners: Female     Birth control/protection: None     Comment: stable relationship   Other Topics Concern     Parent/sibling w/ CABG, MI or angioplasty before 65F 55M? Yes   Social History Narrative    Social Documentation:3/19/10        Balanced Diet: YES    Calcium intake: more than 2 per day    Caffeine: 1-2 cups per day    Exercise:  type of activity gym;  4 times per week    Sunscreen: Yes    Seatbelts:  Yes    Self Breast Exam:  No    Self Testicular Exam: n/a    Physical/Emotional/Sexual Abuse: No    Do you feel safe in your environment? Yes        Cholesterol screen up to date: Lab Test   3/4/09    2/2/07                  0934      0911         CHOL       194       179          HDL        36*       51           LDL        107       103          TRIG       256*      124          CHOLHDLRA* 5.5*      3.5              Eye Exam up to date: No    Dental Exam up to date: Yes    Pap smear up to date: PAP      NIL   3/4/2009    Mammogram up to date: Does Not Apply    Dexa Scan up to date: Does Not Apply    Colonoscopy up to date: Does Not Apply    Immunizations up to date: Yes last td 2005    Glucose screen if over 40:  Yes        Kacy Mccabe CMA                            Additional medical/Social/Surgical histories reviewed in [facility] and updated as appropriate.     REVIEW OF SYSTEMS (6/2/2022)  10 point ROS of systems including Constitutional, Eyes, Respiratory, Cardiovascular, Gastroenterology, Genitourinary, Integumentary, Musculoskeletal, Psychiatric, Allergic/Immunologic were all negative except for pertinent positives noted in my HPI.     PHYSICAL EXAM  Vitals:    06/02/22 1440   Weight: 81.6 kg (180 lb)   Height: 1.791 m (5' 10.5\")     Vital Signs: Ht 1.791 m (5' 10.5\")   Wt 81.6 kg (180 lb)   LMP 07/05/2020 (Exact Date)   BMI 25.46 kg/m   Patient declined being weighed. Body mass index is 25.46 kg/m .    General  - normal appearance, in no obvious distress  HEENT  - conjunctivae not injected, " moist mucous membranes, normocephalic/atraumatic head, ears normal appearance, no lesions, mouth normal appearance, no scars, normal dentition and teeth present  CV  - normal peripheral perfusion  Pulm  - normal respiratory pattern, non-labored  Musculoskeletal - lumbar spine  - stance: normal gait without limp, no obvious leg length discrepancy, normal heel and toe walk  - inspection: normal bone and joint alignment, no obvious scoliosis  - palpation: no paravertebral or bony tenderness  - ROM: flexion slightly exacerbates pain, normal extension, sidebending, rotation  - strength: lower extremities 5/5 in all planes  - special tests:  (-) straight leg raise  (-) slump test  Neuro  - patellar and Achilles DTRs 2+ bilaterally, no lower extremity sensory deficit throughout L5 distribution, grossly normal coordination, normal muscle tone  Skin  - no ecchymosis, erythema, warmth, or induration, no obvious rash  Psych  - interactive, appropriate, normal mood and affect  ASSESSMENT & PLAN  48 yo female with lumbar radicular pain, disc ddd, controlled, not resolved    I independently reviewed the following imaging studies:  Lumbar MRI: shows ddd, disc herniation  Consider repeat KENNEDY at patient's request  Refilled medication: mobic PRN  Cont. HEP  And PT   F/u PRN  Appropriate PPE was utilized for prevention of spread of Covid-19.  Lucien Perez MD, [facility]        Again, thank you for allowing me to participate in the care of your patient.      Sincerely,    Lucien Perez MD

## 2022-06-02 NOTE — NURSING NOTE
"Reason For Visit:   Chief Complaint   Patient presents with     Lower Back - Follow Up       Ht 1.791 m (5' 10.5\")   Wt 81.6 kg (180 lb)   LMP 07/05/2020 (Exact Date)   BMI 25.46 kg/m      Pain Assessment  Patient Currently in Pain: Nereyda Baum ATC    "

## 2022-06-08 NOTE — TELEPHONE ENCOUNTER
6/8 Unable to close this chart due patient was seen on 6/2.    Lamar gonzales Procedure   Orthopedics, Podiatry, Sports Medicine, ENT/Eye Specialties  [facility]   [phone]

## 2022-06-14 ENCOUNTER — E-VISIT (OUTPATIENT)
Dept: FAMILY MEDICINE | Facility: CLINIC | Age: 47
End: 2022-06-14
Payer: COMMERCIAL

## 2022-06-14 DIAGNOSIS — N95.2 VAGINAL ATROPHY: Primary | ICD-10-CM

## 2022-06-14 DIAGNOSIS — N95.1 MENOPAUSAL SYNDROME (HOT FLASHES): ICD-10-CM

## 2022-06-14 PROCEDURE — 99422 OL DIG E/M SVC 11-20 MIN: CPT | Performed by: FAMILY MEDICINE

## 2022-06-15 RX ORDER — VENLAFAXINE HYDROCHLORIDE 75 MG/1
75 CAPSULE, EXTENDED RELEASE ORAL DAILY
Qty: 30 CAPSULE | Refills: 1 | Status: SHIPPED | OUTPATIENT
Start: 2022-06-15 | End: 2022-08-08

## 2022-06-15 RX ORDER — ESTRADIOL 10 UG/1
10 INSERT VAGINAL
Qty: 8 TABLET | Refills: 5 | Status: SHIPPED | OUTPATIENT
Start: 2022-07-15 | End: 2023-01-30

## 2022-06-15 RX ORDER — ESTRADIOL 10 UG/1
INSERT VAGINAL
Qty: 18 TABLET | Refills: 0 | Status: SHIPPED | OUTPATIENT
Start: 2022-06-15 | End: 2022-07-13

## 2022-06-15 NOTE — PATIENT INSTRUCTIONS
Thank you for choosing us for your care. I have placed an order for a prescription so that you can start treatment. View your full visit summary for details by clicking on the link below. Your pharmacist will able to address any questions you may have about the medication.     If you're not feeling better within 5-7 days, please schedule an appointment.  You can schedule an appointment right here in [facility], or call [phone]  If the visit is for the same symptoms as your eVisit, we'll refund the cost of your eVisit if seen within seven days.

## 2022-08-22 ENCOUNTER — OFFICE VISIT (OUTPATIENT)
Dept: DERMATOLOGY | Facility: CLINIC | Age: 47
End: 2022-08-22
Attending: FAMILY MEDICINE
Payer: COMMERCIAL

## 2022-08-22 DIAGNOSIS — D22.9 MULTIPLE BENIGN NEVI: Primary | ICD-10-CM

## 2022-08-22 DIAGNOSIS — L81.4 SOLAR LENTIGO: ICD-10-CM

## 2022-08-22 DIAGNOSIS — L82.1 SEBORRHEIC KERATOSIS: ICD-10-CM

## 2022-08-22 DIAGNOSIS — Z12.83 SKIN CANCER SCREENING: ICD-10-CM

## 2022-08-22 DIAGNOSIS — D18.01 CHERRY ANGIOMA: ICD-10-CM

## 2022-08-22 DIAGNOSIS — D49.2 NEOPLASM OF UNSPECIFIED BEHAVIOR OF BONE, SOFT TISSUE, AND SKIN: ICD-10-CM

## 2022-08-22 DIAGNOSIS — Z12.83 SCREENING FOR SKIN CANCER: ICD-10-CM

## 2022-08-22 DIAGNOSIS — Z86.018 HISTORY OF DYSPLASTIC NEVUS: ICD-10-CM

## 2022-08-22 PROCEDURE — 11102 TANGNTL BX SKIN SINGLE LES: CPT | Performed by: PHYSICIAN ASSISTANT

## 2022-08-22 PROCEDURE — 88305 TISSUE EXAM BY PATHOLOGIST: CPT | Mod: TC | Performed by: PHYSICIAN ASSISTANT

## 2022-08-22 PROCEDURE — 99213 OFFICE O/P EST LOW 20 MIN: CPT | Mod: 25 | Performed by: PHYSICIAN ASSISTANT

## 2022-08-22 PROCEDURE — 88305 TISSUE EXAM BY PATHOLOGIST: CPT | Mod: 26 | Performed by: PATHOLOGY

## 2022-08-22 ASSESSMENT — PAIN SCALES - GENERAL: PAINLEVEL: NO PAIN (0)

## 2022-08-22 NOTE — NURSING NOTE
Lidocaine-epinephrine 1-1:671602 % injection   1mL once for one use, starting 8/22/2022 ending 8/22/2022,  2mL disp, R-0, injection  Injected by Pascale Ramires CMA

## 2022-08-22 NOTE — PATIENT INSTRUCTIONS
Wound Care After a Biopsy    What is a skin biopsy?  A skin biopsy allows the doctor to examine a very small piece of tissue under the microscope to determine the diagnosis and the best treatment for the skin condition. A local anesthetic (numbing medicine)  is injected with a very small needle into the skin area to be tested. A small piece of skin is taken from the area. Sometimes a suture (stitch) is used.     What are the risks of a skin biopsy?  I will experience scar, bleeding, swelling, pain, crusting and redness. I may experience incomplete removal or recurrence. Risks of this procedure are excessive bleeding, bruising, infection, nerve damage, numbness, thick (hypertrophic or keloidal) scar and non-diagnostic biopsy.    How should I care for my wound for the first 24 hours?  Keep the wound dry and covered for 24 hours  If it bleeds, hold direct pressure on the area for 15 minutes. If bleeding does not stop then go to the emergency room  Avoid strenuous exercise the first 1-2 days or as your doctor instructs you    How should I care for the wound after 24 hours?  After 24 hours, remove the bandage  You may bathe or shower as normal  If you had a scalp biopsy, you can shampoo as usual and can use shower water to clean the biopsy site daily  Clean the wound twice a day with gentle soap and water  Do not scrub, be gentle  Apply white petroleum/Vaseline after cleaning the wound with a cotton swab or a clean finger, and keep the site covered with a Bandaid /bandage. Bandages are not necessary with a scalp biopsy  If you are unable to cover the site with a Bandaid /bandage, re-apply ointment 2-3 times a day to keep the site moist. Moisture will help with healing  Avoid strenuous activity for first 1-2 days  Avoid lakes, rivers, pools, and oceans until the stitches are removed or the site is healed    How do I clean my wound?  Wash hands thoroughly with soap or use hand  before all wound care  Clean the  wound with gentle soap and water  Apply white petroleum/Vaseline  to wound after it is clean  Replace the Bandaid /bandage to keep the wound covered for the first few days or as instructed by your doctor  If you had a scalp biopsy, warm shower water to the area on a daily basis should suffice    What should I use to clean my wound?   Cotton-tipped applicators (Qtips )  White petroleum jelly (Vaseline ). Use a clean new container and use Q-tips to apply.  Bandaids   as needed  Gentle soap     How should I care for my wound long term?  Do not get your wound dirty  Keep up with wound care for one week or until the area is healed.  A small scab will form and fall off by itself when the area is completely healed. The area will be red and will become pink in color as it heals. Sun protection is very important for how your scar will turn out. Sunscreen with an SPF 30 or greater is recommended once the area is healed.  You should have some soreness but it should be mild and slowly go away over several days. Talk to your doctor about using tylenol for pain,    When should I call my doctor?  If you have increased:   Pain or swelling  Pus or drainage (clear or slightly yellow drainage is ok)  Temperature over 100F  Spreading redness or warmth around wound    When will I hear about my results?  The biopsy results can take 2 weeks to come back.  Your results will automatically release to [employer] before your provider has even reviewed them.  The clinic will call you with the results, send you a Nykaa message, or have you schedule a follow-up clinic or phone time to discuss the results.  Contact our clinics if you do not hear from us in 2 weeks.    Who should I call with questions?  [facility]: [phone]  [facility]: [phone]  For urgent needs outside of business hours call the [facility] at [phone] and ask for the dermatology resident on call      Patient Education     Checking for Skin Cancer  You can find cancer early by checking your skin each month. There are 3 kinds of skin cancer. They are melanoma, basal cell carcinoma, and squamous cell carcinoma. Doing monthly skin checks is the best way to find new marks or skin changes. Follow the instructions below for checking your skin.   The ABCDEs of checking moles for melanoma   Check your moles or growths for signs of melanoma using ABCDE:   Asymmetry: the sides of the mole or growth don t match  Border: the edges are ragged, notched, or blurred  Color: the color within the mole or growth varies  Diameter: the mole or growth is larger than 6 mm (size of a pencil eraser)  Evolving: the size, shape, or color of the mole or growth is changing (evolving is not shown in the images below)    Checking for other types of skin cancer  Basal cell carcinoma or squamous cell carcinoma have symptoms such as:     A spot or mole that looks different from all other marks on your skin  Changes in how an area feels, such as itching, tenderness, or pain  Changes in the skin's surface, such as oozing, bleeding, or scaliness  A sore that does not heal  New swelling or redness beyond the border of a mole    Who s at risk?  Anyone can get skin cancer. But you are at greater risk if you have:   Fair skin, light-colored hair, or light-colored eyes  Many moles or abnormal moles on your skin  A history of sunburns from sunlight or tanning beds  A family history of skin cancer  A history of exposure to radiation or chemicals  A weakened immune system  If you have had skin cancer in the past, you are at risk for recurring skin cancer.   How to check your skin  Do your monthly skin checkups in front of a full-length mirror. Check all parts of your body, including your:   Head (ears, face, neck, and scalp)  Torso (front, back, and sides)  Arms (tops, undersides, upper, and lower armpits)  Hands (palms, backs, and fingers, including  under the nails)  Buttocks and genitals  Legs (front, back, and sides)  Feet (tops, soles, toes, including under the nails, and between toes)  If you have a lot of moles, take digital photos of them each month. Make sure to take photos both up close and from a distance. These can help you see if any moles change over time.   Most skin changes are not cancer. But if you see any changes in your skin, call your doctor right away. Only he or she can diagnose a problem. If you have skin cancer, seeing your doctor can be the first step toward getting the treatment that could save your life.   [employer] last reviewed this educational content on 4/1/2019 2000-2020 [employer]. [address] [place] [address], [place] [address]. All rights reserved. This information is not intended as a substitute for professional medical care. Always follow your healthcare professional's instructions.       When should I call my doctor?  If you are worsening or not improving, please, contact us or seek urgent care as noted below.     Who should I call with questions (adults)?  [facility] (adult and pediatric): [phone]  [facility] (adult): [phone]  For urgent needs outside of business hours call the [facility] at [phone] and ask for the dermatology resident on call to be paged  If this is a medical emergency and you are unable to reach an ER, Call [phone]    Who should I call with questions (pediatric)?  [facility]- Pediatric Dermatology  Dr. Raquel Briggs, Dr. Ruth Powell, Dr. Lisbeth Tucker, ELIJAH Linton, Dr. Regine Edwards, Dr. Yamini Pablo & Dr. Lucien Maciel  Non-urgent nurse triage line; [phone]- Migdalia and Charito RASMUSSEN Care Coordinatorgustavo Quiroz (/Complex ) [phone]    If you need a prescription refill, please contact your pharmacy. Refills are approved or denied by our  Physicians during normal business hours, Monday through Fridays  Per office policy, refills will not be granted if you have not been seen within the past year (or sooner depending on your child's condition)    Scheduling Information:  Pediatric Appointment Scheduling and Call Center [phone]  Radiology Scheduling- [phone]  Sedation Unit Scheduling- [phone]  [place] Scheduling- General [phone]; Pediatric Dermatology [phone]  Main  Services: [phone]  Romansh: [phone]  Tanzanian: [phone]  Hmong/Costa Rican/Syriac: [phone]  Preadmission Nursing Department Fax Number: [phone] (Fax all pre-operative paperwork to this number)    For urgent matters arising during evenings, weekends, or holidays that cannot wait for normal business hours please call [phone] and ask for the dermatology resident on call to be paged.

## 2022-08-22 NOTE — PROGRESS NOTES
[facility] Dermatology Note  Encounter Date: Aug 22, 2022  Office Visit     Dermatology Problem List:  1. Compound dysplastic nevus with mild atypia, left medial mid back, s/p shave bx 5/13/2021  # NUB, left nasal sidewall, s/p shave bx 8/22/2022  ____________________________________________    Assessment & Plan:    # NUB, left nasal sidewall, biopsy ddx r/o BCC  - Shave biopsy today, see procedure below    # Cherry angiomas  # Seborrheic keratoses  - Reassured of benign nature, no treatment necessary    # Multiple benign nevi  # Solar lentigines  - No concerning lesions today  - Monitor for ABCDEs of melanoma   - Continue sun protection - recommend SPF 30 or higher with frequent application   - Return sooner if noticing changing or symptomatic lesions     # Hx dysplastic nevus, no evidence of recurrent disease  - Continue regular skin exams    Procedures Performed:   - Shave biopsy procedure note, location(s): see above. After discussion of benefits and risks including but not limited to bleeding, infection, scar, incomplete removal, recurrence, and non-diagnostic biopsy, written consent and photographs were obtained. The area was cleaned with isopropyl alcohol. 0.5mL of 1% lidocaine with epinephrine was injected to obtain adequate anesthesia of lesion(s). Shave biopsy at site(s) performed. Hemostasis was achieved with aluminium chloride. Petrolatum ointment and a sterile dressing were applied. The patient tolerated the procedure and no complications were noted. The patient was provided with verbal and written post care instructions.    Follow-up: 1 year(s) in-person, or earlier for new or changing lesions    Staff and Scribe:     Scribe Disclosure:  I, Moi Apodaca, am serving as a scribe to document services personally performed by Heather Ricardo PA-C based on data collection and the provider's statements to me.     Provider Disclosure:   The documentation recorded by the scribe  accurately reflects the services I personally performed and the decisions made by me.    All risks, benefits and alternatives were discussed with patient.  Patient is in agreement and understands the assessment and plan.  All questions were answered.  Sun Screen Education was given.   Return to Clinic annually or sooner as needed.   Heather Ricardo PA-C   [facility]   ____________________________________________    CC: Skin Check (Marjorie is here for a skin check and has spots of concern on her L side of neck, back, upper L arm, Rear R thigh, nose, under L eye, R side of face.)    HPI:    Marjorie Wolf is a(n) 47 year old adult who presents today as a return patient for FBSE. Last seen in dermatology by Anuja Singh PA-C, on 5/13/2021, at which time patient underwent shave biopsy on the left medial mid back which returned as compound dysplastic nevus with mild atypia. Additionally, patient underwent cryo for treatment of ISK.    Today, patient reports spots of concern on L side of neck, back, upper L arm, Rear R thigh, nose, under L eye, R side of face. Family history (mother) of multiple NMSC. History of 1 blistering sunburn at 19 years old. She uses sunscreen regularly.    Patient is otherwise feeling well, without additional skin concerns.    Labs Reviewed:  N/A    Physical Exam:  Vitals: LMP 07/05/2020 (Exact Date)   SKIN: Total skin excluding the undergarment areas was performed. The exam included the head/face, neck, both arms, chest, back, abdomen, both legs, digits and/or nails.   - 3 mm, white papule on the left nasal sidewall.  - There are dome shaped bright red papules on the trunk and extremities.   - Multiple regular brown pigmented macules and papules are identified on the trunk and extremities.   - Scattered brown macules on sun exposed areas.  - There are waxy stuck on tan to brown papules on the trunk and extremities.  - No other lesions of concern on  areas examined.             Medications:  Current Outpatient Medications   Medication     amitriptyline (ELAVIL) 25 MG tablet     estradiol (VAGIFEM) 10 MCG TABS vaginal tablet     Lactase (LACTAID PO)     levalbuterol (XOPENEX HFA) 45 MCG/ACT inhaler     meloxicam (MOBIC) 15 MG tablet     meloxicam (MOBIC) 15 MG tablet     tiZANidine (ZANAFLEX) 4 MG tablet     UNKNOWN MED DOSAGE     venlafaxine (EFFEXOR XR) 75 MG 24 hr capsule     VITAMIN D, CHOLECALCIFEROL, PO     No current facility-administered medications for this visit.      Past Medical History:   Patient Active Problem List   Diagnosis     Allergic rhinitis due to pollen     Family history of other cardiovascular diseases     Low back pain     Hypertriglyceridemia     CARDIOVASCULAR SCREENING; LDL GOAL LESS THAN 160     Intermittent asthma     Insomnia     SVT (supraventricular tachycardia) (H)     Family history of hemochromatosis     Hashimoto's thyroiditis     Thyroid enlargement     Thyroid nodule     ACP (advance care planning)     Past Medical History:   Diagnosis Date     Allergic rhinitis due to other allergen      Arthritis      Insomnia     benadryl nightly (50mg), se's on trazodone     Mild intermittent asthma     uses maxair 2-3x/yr     Personal history of other diseases of circulatory system 1995    Virally induced stroke- high fever, resolved completely     SVT (supraventricular tachycardia) (H)      Thyroid disease May 2014    Hypothyroidism        CC Jeanette Rodrigues,   [address] [facility]  [address]  [place] [address] on close of this encounter.

## 2022-08-22 NOTE — NURSING NOTE
Dermatology Rooming Note    Marjorie Lozanowilfrido's goals for this visit include:   Chief Complaint   Patient presents with     Skin Check     Marjorie is here for a skin check and has spots of concern on her L side of neck, back, upper L arm, Rear R thigh, nose, under L eye, R side of face.   Jorge A Perez, EMT

## 2022-08-22 NOTE — LETTER
8/22/2022       RE: Marjorie Wolf  [address] [facility] [address]     Dear Colleague,    Thank you for referring your patient, Marjorie Wolf, to the [facility] DERMATOLOGY CLINIC [place] at [facility]. Please see a copy of my visit note below.    [facility] Dermatology Note  Encounter Date: Aug 22, 2022  Office Visit     Dermatology Problem List:  1. Compound dysplastic nevus with mild atypia, left medial mid back, s/p shave bx 5/13/2021  # NUB, left nasal sidewall, s/p shave bx 8/22/2022  ____________________________________________    Assessment & Plan:    # NUB, left nasal sidewall, biopsy ddx r/o BCC  - Shave biopsy today, see procedure below    # Cherry angiomas  # Seborrheic keratoses  - Reassured of benign nature, no treatment necessary    # Multiple benign nevi  # Solar lentigines  - No concerning lesions today  - Monitor for ABCDEs of melanoma   - Continue sun protection - recommend SPF 30 or higher with frequent application   - Return sooner if noticing changing or symptomatic lesions     # Hx dysplastic nevus, no evidence of recurrent disease  - Continue regular skin exams    Procedures Performed:   - Shave biopsy procedure note, location(s): see above. After discussion of benefits and risks including but not limited to bleeding, infection, scar, incomplete removal, recurrence, and non-diagnostic biopsy, written consent and photographs were obtained. The area was cleaned with isopropyl alcohol. 0.5mL of 1% lidocaine with epinephrine was injected to obtain adequate anesthesia of lesion(s). Shave biopsy at site(s) performed. Hemostasis was achieved with aluminium chloride. Petrolatum ointment and a sterile dressing were applied. The patient tolerated the procedure and no complications were noted. The patient was provided with verbal and written post care instructions.    Follow-up: 1 year(s) in-person, or  earlier for new or changing lesions    Staff and Scribe:     Scribe Disclosure:  I, Moi Apodaca, am serving as a scribe to document services personally performed by Heather Ricardo PA-C based on data collection and the provider's statements to me.     Provider Disclosure:   The documentation recorded by the scribe accurately reflects the services I personally performed and the decisions made by me.    All risks, benefits and alternatives were discussed with patient.  Patient is in agreement and understands the assessment and plan.  All questions were answered.  Sun Screen Education was given.   Return to Clinic annually or sooner as needed.   Heather Ricardo PA-C   [facility] D[facility]   ____________________________________________    CC: Skin Check (Marjorie is here for a skin check and has spots of concern on her L side of neck, back, upper L arm, Rear R thigh, nose, under L eye, R side of face.)    HPI:    Marjorie Wolf is a(n) 47 year old adult who presents today as a return patient for FBSE. Last seen in dermatology by Anuja Singh PA-C, on 5/13/2021, at which time patient underwent shave biopsy on the left medial mid back which returned as compound dysplastic nevus with mild atypia. Additionally, patient underwent cryo for treatment of ISK.    Today, patient reports spots of concern on L side of neck, back, upper L arm, Rear R thigh, nose, under L eye, R side of face. Family history (mother) of multiple NMSC. History of 1 blistering sunburn at 19 years old. She uses sunscreen regularly.    Patient is otherwise feeling well, without additional skin concerns.    Labs Reviewed:  N/A    Physical Exam:  Vitals: LMP 07/05/2020 (Exact Date)   SKIN: Total skin excluding the undergarment areas was performed. The exam included the head/face, neck, both arms, chest, back, abdomen, both legs, digits and/or nails.   - 3 mm, white papule on the left nasal sidewall.  - There  are dome shaped bright red papules on the trunk and extremities.   - Multiple regular brown pigmented macules and papules are identified on the trunk and extremities.   - Scattered brown macules on sun exposed areas.  - There are waxy stuck on tan to brown papules on the trunk and extremities.  - No other lesions of concern on areas examined.             Medications:  Current Outpatient Medications   Medication     amitriptyline (ELAVIL) 25 MG tablet     estradiol (VAGIFEM) 10 MCG TABS vaginal tablet     Lactase (LACTAID PO)     levalbuterol (XOPENEX HFA) 45 MCG/ACT inhaler     meloxicam (MOBIC) 15 MG tablet     meloxicam (MOBIC) 15 MG tablet     tiZANidine (ZANAFLEX) 4 MG tablet     UNKNOWN MED DOSAGE     venlafaxine (EFFEXOR XR) 75 MG 24 hr capsule     VITAMIN D, CHOLECALCIFEROL, PO     No current facility-administered medications for this visit.      Past Medical History:   Patient Active Problem List   Diagnosis     Allergic rhinitis due to pollen     Family history of other cardiovascular diseases     Low back pain     Hypertriglyceridemia     CARDIOVASCULAR SCREENING; LDL GOAL LESS THAN 160     Intermittent asthma     Insomnia     SVT (supraventricular tachycardia) (H)     Family history of hemochromatosis     Hashimoto's thyroiditis     Thyroid enlargement     Thyroid nodule     ACP (advance care planning)     Past Medical History:   Diagnosis Date     Allergic rhinitis due to other allergen      Arthritis      Insomnia     benadryl nightly (50mg), se's on trazodone     Mild intermittent asthma     uses maxair 2-3x/yr     Personal history of other diseases of circulatory system 1995    Virally induced stroke- high fever, resolved completely     SVT (supraventricular tachycardia) (H)      Thyroid disease May 2014    Hypothyroidism        CC Jeanette Rodrigues,   [address] [id number] on close of this encounter.

## 2022-08-24 LAB
PATH REPORT.COMMENTS IMP SPEC: NORMAL
PATH REPORT.COMMENTS IMP SPEC: NORMAL
PATH REPORT.FINAL DX SPEC: NORMAL
PATH REPORT.GROSS SPEC: NORMAL
PATH REPORT.MICROSCOPIC SPEC OTHER STN: NORMAL
PATH REPORT.RELEVANT HX SPEC: NORMAL

## 2022-08-26 ENCOUNTER — TELEPHONE (OUTPATIENT)
Dept: DERMATOLOGY | Facility: CLINIC | Age: 47
End: 2022-08-26

## 2022-08-26 DIAGNOSIS — C44.91 BCC (BASAL CELL CARCINOMA): Primary | ICD-10-CM

## 2022-08-29 ENCOUNTER — TELEPHONE (OUTPATIENT)
Dept: DERMATOLOGY | Facility: CLINIC | Age: 47
End: 2022-08-29

## 2022-08-29 NOTE — TELEPHONE ENCOUNTER
Called patient to schedule surgery with Dr. Evans    Date of Surgery: 10/26    Surgery type: mohs    Consult scheduled: Yes    Has patient had mohs before? 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 Wanted to wait until she got back for mohs. Prefers appts on Mon/Weds      Shilpa Calloway on 8/29/2022 at 3:51 PM

## 2022-08-30 NOTE — TELEPHONE ENCOUNTER
FUTURE VISIT INFORMATION      FUTURE VISIT INFORMATION:    Date: 9.28.22    Time: 1:15    Location: Telephone  REFERRAL INFORMATION:    Referring provider:  Davion    Referring providers clinic:  Derm    Reason for visit/diagnosis  Nodular BCC, Left Nasal Sidewall    RECORDS REQUESTED FROM:       Clinic name Comments Records Status Imaging Status   Derm 8.22.22  Path # [id number] Epic Epic

## 2022-09-01 DIAGNOSIS — M51.16 LUMBAR DISC HERNIATION WITH RADICULOPATHY: ICD-10-CM

## 2022-09-01 RX ORDER — MELOXICAM 15 MG/1
TABLET ORAL
Qty: 90 TABLET | Refills: 0 | Status: SHIPPED | OUTPATIENT
Start: 2022-09-01 | End: 2024-10-02

## 2022-09-09 ENCOUNTER — ANCILLARY PROCEDURE (OUTPATIENT)
Dept: MAMMOGRAPHY | Facility: CLINIC | Age: 47
End: 2022-09-09
Attending: FAMILY MEDICINE
Payer: COMMERCIAL

## 2022-09-09 DIAGNOSIS — Z12.31 VISIT FOR SCREENING MAMMOGRAM: ICD-10-CM

## 2022-09-09 PROCEDURE — 77067 SCR MAMMO BI INCL CAD: CPT | Mod: GC | Performed by: RADIOLOGY

## 2022-09-09 PROCEDURE — 77063 BREAST TOMOSYNTHESIS BI: CPT | Mod: GC | Performed by: RADIOLOGY

## 2022-09-21 ASSESSMENT — ENCOUNTER SYMPTOMS
COUGH: 0
SHORTNESS OF BREATH: 0
HEMATOCHEZIA: 0
DIARRHEA: 0
FREQUENCY: 0
PALPITATIONS: 0
DYSURIA: 0
HEARTBURN: 0
NAUSEA: 0
MYALGIAS: 0
FEVER: 0
PARESTHESIAS: 0
CONSTIPATION: 0
EYE PAIN: 0
CHILLS: 0
HEMATURIA: 0
HEADACHES: 0
JOINT SWELLING: 0
DIZZINESS: 0
ABDOMINAL PAIN: 0
ARTHRALGIAS: 0
NERVOUS/ANXIOUS: 0
WEAKNESS: 0
BREAST MASS: 0
SORE THROAT: 0

## 2022-09-21 ASSESSMENT — ASTHMA QUESTIONNAIRES
QUESTION_3 LAST FOUR WEEKS HOW OFTEN DID YOUR ASTHMA SYMPTOMS (WHEEZING, COUGHING, SHORTNESS OF BREATH, CHEST TIGHTNESS OR PAIN) WAKE YOU UP AT NIGHT OR EARLIER THAN USUAL IN THE MORNING: NOT AT ALL
QUESTION_4 LAST FOUR WEEKS HOW OFTEN HAVE YOU USED YOUR RESCUE INHALER OR NEBULIZER MEDICATION (SUCH AS ALBUTEROL): NOT AT ALL
ACT_TOTALSCORE: 25
QUESTION_2 LAST FOUR WEEKS HOW OFTEN HAVE YOU HAD SHORTNESS OF BREATH: NOT AT ALL
ACT_TOTALSCORE: 25
QUESTION_5 LAST FOUR WEEKS HOW WOULD YOU RATE YOUR ASTHMA CONTROL: COMPLETELY CONTROLLED
QUESTION_1 LAST FOUR WEEKS HOW MUCH OF THE TIME DID YOUR ASTHMA KEEP YOU FROM GETTING AS MUCH DONE AT WORK, SCHOOL OR AT HOME: NONE OF THE TIME

## 2022-09-28 ENCOUNTER — VIRTUAL VISIT (OUTPATIENT)
Dept: DERMATOLOGY | Facility: CLINIC | Age: 47
End: 2022-09-28

## 2022-09-28 ENCOUNTER — OFFICE VISIT (OUTPATIENT)
Dept: FAMILY MEDICINE | Facility: CLINIC | Age: 47
End: 2022-09-28
Payer: COMMERCIAL

## 2022-09-28 ENCOUNTER — PRE VISIT (OUTPATIENT)
Dept: DERMATOLOGY | Facility: CLINIC | Age: 47
End: 2022-09-28

## 2022-09-28 ENCOUNTER — TELEPHONE (OUTPATIENT)
Dept: GASTROENTEROLOGY | Facility: CLINIC | Age: 47
End: 2022-09-28

## 2022-09-28 VITALS
BODY MASS INDEX: 25.75 KG/M2 | TEMPERATURE: 97.3 F | WEIGHT: 182 LBS | OXYGEN SATURATION: 97 % | SYSTOLIC BLOOD PRESSURE: 116 MMHG | HEART RATE: 75 BPM | DIASTOLIC BLOOD PRESSURE: 75 MMHG

## 2022-09-28 DIAGNOSIS — Z12.4 SCREENING FOR CERVICAL CANCER: ICD-10-CM

## 2022-09-28 DIAGNOSIS — E06.3 HASHIMOTO'S THYROIDITIS: ICD-10-CM

## 2022-09-28 DIAGNOSIS — I47.10 SVT (SUPRAVENTRICULAR TACHYCARDIA) (H): ICD-10-CM

## 2022-09-28 DIAGNOSIS — C44.311 BASAL CELL CARCINOMA (BCC) OF LATERAL SIDE WALL OF NOSE: Primary | ICD-10-CM

## 2022-09-28 DIAGNOSIS — Z12.11 SCREEN FOR COLON CANCER: ICD-10-CM

## 2022-09-28 DIAGNOSIS — N95.2 VAGINAL ATROPHY: ICD-10-CM

## 2022-09-28 DIAGNOSIS — C44.310 BCC (BASAL CELL CARCINOMA), FACE: ICD-10-CM

## 2022-09-28 DIAGNOSIS — G47.00 INSOMNIA, UNSPECIFIED TYPE: ICD-10-CM

## 2022-09-28 DIAGNOSIS — N95.1 MENOPAUSAL SYNDROME (HOT FLASHES): ICD-10-CM

## 2022-09-28 DIAGNOSIS — Z00.00 ROUTINE GENERAL MEDICAL EXAMINATION AT A HEALTH CARE FACILITY: Primary | ICD-10-CM

## 2022-09-28 DIAGNOSIS — M51.26 LUMBAR DISC HERNIATION: ICD-10-CM

## 2022-09-28 PROCEDURE — 99214 OFFICE O/P EST MOD 30 MIN: CPT | Mod: 25 | Performed by: FAMILY MEDICINE

## 2022-09-28 PROCEDURE — 90715 TDAP VACCINE 7 YRS/> IM: CPT | Performed by: FAMILY MEDICINE

## 2022-09-28 PROCEDURE — 90471 IMMUNIZATION ADMIN: CPT | Performed by: FAMILY MEDICINE

## 2022-09-28 PROCEDURE — 90686 IIV4 VACC NO PRSV 0.5 ML IM: CPT | Performed by: FAMILY MEDICINE

## 2022-09-28 PROCEDURE — 87624 HPV HI-RISK TYP POOLED RSLT: CPT | Performed by: FAMILY MEDICINE

## 2022-09-28 PROCEDURE — 99396 PREV VISIT EST AGE 40-64: CPT | Mod: 25 | Performed by: FAMILY MEDICINE

## 2022-09-28 PROCEDURE — 99212 OFFICE O/P EST SF 10 MIN: CPT | Mod: 95 | Performed by: DERMATOLOGY

## 2022-09-28 PROCEDURE — 90472 IMMUNIZATION ADMIN EACH ADD: CPT | Performed by: FAMILY MEDICINE

## 2022-09-28 PROCEDURE — G0145 SCR C/V CYTO,THINLAYER,RESCR: HCPCS | Performed by: FAMILY MEDICINE

## 2022-09-28 RX ORDER — VENLAFAXINE HYDROCHLORIDE 75 MG/1
75 CAPSULE, EXTENDED RELEASE ORAL DAILY
Qty: 90 CAPSULE | Refills: 1 | Status: SHIPPED | OUTPATIENT
Start: 2022-09-28 | End: 2023-05-05

## 2022-09-28 NOTE — TELEPHONE ENCOUNTER
Screening Questions    BlueKIND OF PREP RedLOCATION [review exclusion criteria] GreenSEDATION TYPE        Y Are you active on mychart?   Jeanette Rodrigues, DO Ordering/Referring Provider?    PO  What type of coverage do you have?  N Have you had a positive covid test in the last 90 days?     1. BMI  [BMI 40+ - review exclusion criteria]  25.4     2. Are you able to give consent for your medical care? [IF NO,RN REVIEW]  SELF           3. Are you taking any prescription pain medications on a routine schedule?    N       3a. N EXTENDED PREP What kind of prescription?   4. Do you have any chemical dependencies such as alcohol, street drugs, or methadone?  N     5. Do you have any history of post-traumatic stress syndrome, severe anxiety or history of psychosis?    N    **If yes 3- 5 , please schedule with MAC sedation.**    BMI OVER 40 NEED PAC EVALUATION FOR [facility]          IF YES TO ANY 6 - 10 - HOSPITAL SETTING ONLY.     6.   Do you need assistance transferring?    N   7.   Have you had a heart or lung transplant?    N  8.   Are you currently on dialysis?   N  9.  Do you use daily home oxygen?   N  10. Do you take nitroglycerin?   N  10a. N If yes, how often?      11. [FEMALES]  N Are you currently pregnant?    11a. N If yes, how many weeks? [ Greater than 12 weeks, OR NEEDED]    12. Do you have Pulmonary Hypertension? *NEED PAC APPT AT [facility]*   N    13. [review exclusion criteria]  Do you have any implantable devices in your body (pacemaker, defib, LVAD)?  N    14. In the past 6 months, have you had any heart related issues including cardiomyopathy or heart attack?   N    14a. N If yes, did it require cardiac stenting if so when?     15. Have you had a stroke or Transient ischemic attack (TIA - aka  mini stroke ) within 6 months?    N    16. Do you have mod to severe Obstructive Sleep Apnea?  [Hospital only - Ok at [place]]  N    17. Do you have SEVERE AND UNCONTROLLED asthma?   N  *NEED PAC APPT AT [facility]*     18. Are  "you currently taking any blood thinners?  N     18a. If yes, inform patient to \"follow up w/ ordering provider for bridging instructions.\"    19. Do you take the medication Phentermine?  N    19a. If yes, \"Hold for 7 days before procedure.  Please consult your prescribing provider if you have questions about holding this medication.\"     20.  Do you have chronic kidney disease?  N      21.  Do you have a diagnosis of diabetes?   N    22.  On a regular basis do you go 3-5 days between bowel movements?   N    23. Preferred LOCAL Pharmacy for Pre Prescription    [ LIST ONLY ONE PHARMACY]     [id number] - [place], [place] - [address] AT [address] [facility]        - CLOSING REMINDERS -    Informed patient they will need an adult    Cannot take any type of public or medical transportation alone  1. Conscious Sedation- Needs  for 6 hours after the procedure       MAC/General-Needs  for 24 hours after procedure    Pre-Procedure Covid test to be completed [[facility] PCR Testing Required]    Confirmed Nurse will call to complete assessment       - SCHEDULING DETAILS -    MATIAS Surgeon    12/19/2022  Date of Procedure  Type of Procedure Scheduled  Lower Endoscopy [Colonoscopy]     [facility]  Location  Which Colonoscopy Prep was Sent?  [facility] PREP-If you answer yes to questions #8, #20, #21     CS  Sedation Type     N  PAC / Pre-op Required         Additional comments:  N                 "

## 2022-09-28 NOTE — PROGRESS NOTES
SUBJECTIVE:   CC: Marjorie is an 47 year old who presents for preventive health visit.       Patient has been advised of split billing requirements and indicates understanding: Yes  Healthy Habits:     Getting at least 3 servings of Calcium per day:  Yes    Bi-annual eye exam:  Yes    Dental care twice a year:  Yes    Sleep apnea or symptoms of sleep apnea:  None    Diet:  Vegetarian/vegan    Frequency of exercise:  4-5 days/week    Duration of exercise:  45-60 minutes    Taking medications regularly:  Yes    Medication side effects:  Not applicable    PHQ-2 Total Score: 0    Additional concerns today:  Yes          -------------------------------------    Today's PHQ-2 Score:   PHQ-2 ( 1999 Pfizer) 9/21/2022   Q1: Little interest or pleasure in doing things 0   Q2: Feeling down, depressed or hopeless 0   PHQ-2 Score 0   PHQ-2 Total Score (12-17 Years)- Positive if 3 or more points; Administer PHQ-A if positive -   Q1: Little interest or pleasure in doing things Not at all   Q2: Feeling down, depressed or hopeless Not at all   PHQ-2 Score 0       Abuse: Current or Past (Physical, Sexual or Emotional) - No  Do you feel safe in your environment? Yes    Have you ever done Advance Care Planning? (For example, a Health Directive, POLST, or a discussion with a medical provider or your loved ones about your wishes):     Social History     Tobacco Use     Smoking status: Never Smoker     Smokeless tobacco: Never Used   Substance Use Topics     Alcohol use: Yes     Alcohol/week: 0.0 standard drinks     Comment: Occas.     If you drink alcohol do you typically have >3 drinks per day or >7 drinks per week? No    Alcohol Use 9/21/2022   Prescreen: >3 drinks/day or >7 drinks/week? No   Prescreen: >3 drinks/day or >7 drinks/week? -   No flowsheet data found.    Reviewed orders with patient.  Reviewed health maintenance and updated orders accordingly - Yes  Patient Active Problem List   Diagnosis     Allergic rhinitis due to  pollen     Family history of other cardiovascular diseases     Low back pain     Hypertriglyceridemia     CARDIOVASCULAR SCREENING; LDL GOAL LESS THAN 160     Intermittent asthma     Insomnia     SVT (supraventricular tachycardia) (H)     Family history of hemochromatosis     Hashimoto's thyroiditis     Thyroid enlargement     Thyroid nodule     ACP (advance care planning)     BCC (basal cell carcinoma), face     Past Surgical History:   Procedure Laterality Date     ENT SURGERY  1990    2 surgeries for deviated septum     SURGICAL HISTORY OF -   2000    wisdom teeth extraction     ZZC APPENDECTOMY  4/1989     ZZC NONSPECIFIC PROCEDURE  6/1990    Nasal surgery for deviated septum     ZZC NONSPECIFIC PROCEDURE  11/1990    Nasal Surgery for deviated septum       Social History     Tobacco Use     Smoking status: Never Smoker     Smokeless tobacco: Never Used   Substance Use Topics     Alcohol use: Yes     Alcohol/week: 0.0 standard drinks     Comment: Occas.     Family History   Problem Relation Age of Onset     Diabetes Mother         Type 2     Arthritis Mother      Depression Mother         anxiety/panic attacks as well     Obesity Mother      Thyroid Disease Mother         dx ~35yrs     Hypertension Mother      Hyperlipidemia Mother      Skin Cancer Mother      C.A.D. Father         MI at age 54     Gastrointestinal Disease Father         Ulcer H-Pylori?     Lipids Father      Hypertension Father      Hyperlipidemia Father      Genetic Disorder Father      Arthritis Maternal Grandmother      C.A.D. Maternal Grandfather         Artherosclerosis- cause of death, unsure if previous MI     Diabetes Maternal Grandfather         Type 2     Eye Disorder Maternal Grandfather         Glaucoma     Arthritis Sister      Depression Sister      Neurologic Disorder Sister         Migraines     Lipids Sister      Cerebrovascular Disease No family hx of      Breast Cancer No family hx of         Mother had biopsy 2016, found  non-malignant     Cancer - colorectal No family hx of            Breast Cancer Screening:    Breast CA Risk Assessment (FHS-7) 9/21/2021   Do you have a family history of breast, colon, or ovarian cancer? No / Unknown         Mammogram Screening: Recommended annual mammography  Pertinent mammograms are reviewed under the imaging tab.    History of abnormal Pap smear: NO - age 30-65 PAP every 5 years with negative HPV co-testing recommended  PAP / HPV Latest Ref Rng & Units 6/20/2018 5/16/2014 3/22/2011   PAP (Historical) - NIL NIL NIL   HPV16 NEG:Negative Negative - -   HPV18 NEG:Negative Negative - -   HRHPV NEG:Negative Negative - -     Reviewed and updated as needed this visit by clinical staff   Tobacco  Allergies  Meds   Med Hx  Surg Hx  Fam Hx  Soc Hx          Reviewed and updated as needed this visit by Provider                       Review of Systems  CONSTITUTIONAL:slight weight gain - some menopausal symptoms   INTEGUMENTARY/SKIN: NEGATIVE for worrisome rashes, moles or lesions  EYES: NEGATIVE for vision changes or irritation  ENT: NEGATIVE for ear, mouth and throat problems  RESP: NEGATIVE for significant cough or SOB  BREAST: NEGATIVE for masses, tenderness or discharge  CV: NEGATIVE for chest pain, palpitations or peripheral edema  GI: NEGATIVE for nausea, abdominal pain, heartburn, or change in bowel habits  : NEGATIVE for unusual urinary or vaginal symptoms. No vaginal bleeding.  MUSCULOSKELETAL: NEGATIVE for significant arthralgias or myalgia  NEURO: NEGATIVE for weakness, dizziness or paresthesias  PSYCHIATRIC: NEGATIVE for changes in mood or affect      OBJECTIVE:   /75   Pulse 75   Temp 97.3  F (36.3  C) (Temporal)   Wt 82.6 kg (182 lb)   LMP 07/05/2020 (Exact Date)   SpO2 97%   BMI 25.75 kg/m    Physical Exam  GENERAL APPEARANCE: healthy, alert and no distress  EYES: Eyes grossly normal to inspection, PERRL and conjunctivae and sclerae normal  HENT: ear canals and TM's normal,  nose and mouth without ulcers or lesions, oropharynx clear and oral mucous membranes moist  NECK: no adenopathy, no asymmetry, masses, or scars and thyroid normal to palpation  RESP: lungs clear to auscultation - no rales, rhonchi or wheezes  BREAST: normal without masses, tenderness or nipple discharge and no palpable axillary masses or adenopathy  CV: regular rate and rhythm, normal S1 S2, no S3 or S4, no murmur, click or rub, no peripheral edema and peripheral pulses strong  ABDOMEN: soft, nontender, no hepatosplenomegaly, no masses and bowel sounds normal   (female): normal female external genitalia, normal urethral meatus, vaginal mucosal atrophy noted, normal cervix, adnexae, and uterus without masses or abnormal discharge  MS: no musculoskeletal defects are noted and gait is age appropriate without ataxia  SKIN: no suspicious lesions or rashes  NEURO: Normal strength and tone, sensory exam grossly normal, mentation intact and speech normal  PSYCH: mentation appears normal and affect normal/bright    Diagnostic Test Results:  Labs reviewed in Epic    ASSESSMENT/PLAN:   (Z00.00) Routine general medical examination at a health care facility  (primary encounter diagnosis)  Comment:    Plan: Lipid panel reflex to direct LDL Fasting,         Comprehensive metabolic panel (BMP + Alb, Alk         Phos, ALT, AST, Total. Bili, TP)             (Z12.11) Screen for colon cancer  Comment:    Plan: Colonoscopy Screening  Referral,         Comprehensive metabolic panel (BMP + Alb, Alk         Phos, ALT, AST, Total. Bili, TP)             (C44.310) BCC (basal cell carcinoma), face  Comment:  Working with dermatology   Plan: Comprehensive metabolic panel (BMP + Alb, Alk         Phos, ALT, AST, Total. Bili, TP)             (M51.26) Lumbar disc herniation  Comment:    Plan: Comprehensive metabolic panel (BMP + Alb, Alk         Phos, ALT, AST, Total. Bili, TP), tiZANidine         (ZANAFLEX) 4 MG tablet          "    (N95.2) Vaginal atrophy  Comment:  Will refill vaginal estrogen when needed   Plan: Comprehensive metabolic panel (BMP + Alb, Alk         Phos, ALT, AST, Total. Bili, TP)             (N95.1) Menopausal syndrome (hot flashes)  Comment: on effexor and some help but still having menopausal symptoms  She will look into HRT and send evisit with me if interested in future   Plan: Comprehensive metabolic panel (BMP + Alb, Alk         Phos, ALT, AST, Total. Bili, TP), venlafaxine         (EFFEXOR XR) 75 MG 24 hr capsule             (G47.00) Insomnia, unspecified type  Comment: referral to sleep clinic -   Plan: Comprehensive metabolic panel (BMP + Alb, Alk         Phos, ALT, AST, Total. Bili, TP), amitriptyline        (ELAVIL) 25 MG tablet, Adult Sleep Eval &         Management  Referral             (E06.3) Hashimoto's thyroiditis  Comment:  Lab only visit in one year   Plan: TSH with free T4 reflex, Comprehensive         metabolic panel (BMP + Alb, Alk Phos, ALT, AST,        Total. Bili, TP)             (Z12.4) Screening for cervical cancer  Comment:    Plan: Pap screen with HPV - recommended age 30 - 65         years          SVT -   Has been stable  -                Answers for HPI/ROS submitted by the patient on 9/21/2022  Blood in stool: No  heartburn: No  peripheral edema: No  mood changes: No  Skin sensation changes: No  pelvic pain: No  vaginal bleeding: No  vaginal discharge: No  tenderness: No  breast mass: No  breast discharge: No  impotence: No  penile discharge: No    COUNSELING:  Reviewed preventive health counseling, as reflected in patient instructions    Estimated body mass index is 25.75 kg/m  as calculated from the following:    Height as of 6/2/22: 1.791 m (5' 10.5\").    Weight as of this encounter: 82.6 kg (182 lb).    Weight management plan: Discussed healthy diet and exercise guidelines    Marjorie Wolf reports that Marjorie Wolf has never smoked. Marjorie Wolf has never " used smokeless tobacco.      Counseling Resources:  ATP IV Guidelines  Pooled Cohorts Equation Calculator  Breast Cancer Risk Calculator  BRCA-Related Cancer Risk Assessment: FHS-7 Tool  FRAX Risk Assessment  ICSI Preventive Guidelines  Dietary Guidelines for Americans, 2010  USDA's MyPlate  ASA Prophylaxis  Lung CA Screening    Jeanette Rodrigues DO  [facility]

## 2022-09-28 NOTE — LETTER
9/28/2022       RE: Marjorie Wolf  [address] [facility] [address]     Dear Colleague,    Thank you for referring your patient, Marjorie Wolf, to the [facility] DERMATOLOGIC SURGERY CLINIC [place] at [facility]. Please see a copy of my visit note below.    Telephone visit  Location [facility]  Phone: patient cell  Start time 1:00  End time 1:10    BCC nose  - Discussed R/B/alt of Mohs  - Patient wishes to proceed  - Likely sliding flap repair.      Vishnu Evans MD

## 2022-09-28 NOTE — PROGRESS NOTES
Telephone visit  Location [facility]  Phone: patient cell  Start time 1:00  End time 1:10    BCC nose  - Discussed R/B/alt of Mohs  - Patient wishes to proceed  - Likely sliding flap repair.      Vishnu Evans MD

## 2022-10-01 LAB
BKR LAB AP GYN ADEQUACY: NORMAL
BKR LAB AP GYN INTERPRETATION: NORMAL
BKR LAB AP HPV REFLEX: NORMAL
BKR LAB AP PREVIOUS ABNORMAL: NORMAL
PATH REPORT.COMMENTS IMP SPEC: NORMAL
PATH REPORT.COMMENTS IMP SPEC: NORMAL
PATH REPORT.RELEVANT HX SPEC: NORMAL

## 2022-10-04 LAB
HUMAN PAPILLOMA VIRUS 16 DNA: NEGATIVE
HUMAN PAPILLOMA VIRUS 18 DNA: NEGATIVE
HUMAN PAPILLOMA VIRUS FINAL DIAGNOSIS: NORMAL
HUMAN PAPILLOMA VIRUS OTHER HR: NEGATIVE

## 2022-10-05 ENCOUNTER — LAB (OUTPATIENT)
Dept: LAB | Facility: CLINIC | Age: 47
End: 2022-10-05
Payer: COMMERCIAL

## 2022-10-05 DIAGNOSIS — Z12.11 SCREEN FOR COLON CANCER: ICD-10-CM

## 2022-10-05 DIAGNOSIS — E06.3 HASHIMOTO'S THYROIDITIS: ICD-10-CM

## 2022-10-05 DIAGNOSIS — N95.2 VAGINAL ATROPHY: ICD-10-CM

## 2022-10-05 DIAGNOSIS — C44.310 BCC (BASAL CELL CARCINOMA), FACE: ICD-10-CM

## 2022-10-05 DIAGNOSIS — G47.00 INSOMNIA, UNSPECIFIED TYPE: ICD-10-CM

## 2022-10-05 DIAGNOSIS — M51.26 LUMBAR DISC HERNIATION: ICD-10-CM

## 2022-10-05 DIAGNOSIS — Z00.00 ROUTINE GENERAL MEDICAL EXAMINATION AT A HEALTH CARE FACILITY: ICD-10-CM

## 2022-10-05 DIAGNOSIS — N95.1 MENOPAUSAL SYNDROME (HOT FLASHES): ICD-10-CM

## 2022-10-05 LAB
ALBUMIN SERPL-MCNC: 3.9 G/DL (ref 3.4–5)
ALP SERPL-CCNC: 116 U/L (ref 40–150)
ALT SERPL W P-5'-P-CCNC: 31 U/L (ref 0–70)
ANION GAP SERPL CALCULATED.3IONS-SCNC: 4 MMOL/L (ref 3–14)
AST SERPL W P-5'-P-CCNC: 27 U/L (ref 0–45)
BILIRUB SERPL-MCNC: 0.4 MG/DL (ref 0.2–1.3)
BUN SERPL-MCNC: 11 MG/DL (ref 7–30)
CALCIUM SERPL-MCNC: 9.3 MG/DL (ref 8.5–10.1)
CHLORIDE BLD-SCNC: 106 MMOL/L (ref 94–109)
CHOLEST SERPL-MCNC: 225 MG/DL
CO2 SERPL-SCNC: 28 MMOL/L (ref 20–32)
CREAT SERPL-MCNC: 0.72 MG/DL (ref 0.52–1.25)
FASTING STATUS PATIENT QL REPORTED: YES
GFR SERPL CREATININE-BSD FRML MDRD: >90 ML/MIN/1.73M2
GLUCOSE BLD-MCNC: 89 MG/DL (ref 70–99)
HDLC SERPL-MCNC: 69 MG/DL
LDLC SERPL CALC-MCNC: 122 MG/DL
NONHDLC SERPL-MCNC: 156 MG/DL
POTASSIUM BLD-SCNC: 4.1 MMOL/L (ref 3.4–5.3)
PROT SERPL-MCNC: 7.7 G/DL (ref 6.8–8.8)
SODIUM SERPL-SCNC: 138 MMOL/L (ref 133–144)
T4 FREE SERPL-MCNC: 0.62 NG/DL
TRIGL SERPL-MCNC: 168 MG/DL
TSH SERPL DL<=0.005 MIU/L-ACNC: 4.13 MU/L (ref 0.4–4)

## 2022-10-05 PROCEDURE — 84443 ASSAY THYROID STIM HORMONE: CPT

## 2022-10-05 PROCEDURE — 80053 COMPREHEN METABOLIC PANEL: CPT

## 2022-10-05 PROCEDURE — 36415 COLL VENOUS BLD VENIPUNCTURE: CPT

## 2022-10-05 PROCEDURE — 84439 ASSAY OF FREE THYROXINE: CPT

## 2022-10-05 PROCEDURE — 80061 LIPID PANEL: CPT

## 2022-10-11 NOTE — RESULT ENCOUNTER NOTE
Dear Marjorie,   Your test results are all back -   -Cholesterol levels (LDL,HDL, Triglycerides) are borderline - keep working on healthy diet and exercise.  ADVISE: rechecking in 1 year.   -Liver and gallbladder tests are normal (ALT,AST, Alk phos, bilirubin), kidney function is normal (Cr, GFR), sodium is normal, potassium is normal, calcium is normal, glucose is normal.  -TSH is elevated but the T4 is normal.  We do not need to treat you at this time but should monitor closely.  Plan to recheck a TSH in 6 months.  Let us know if you have any questions.  -Jeanette Rodrigues, DO

## 2022-10-26 ENCOUNTER — OFFICE VISIT (OUTPATIENT)
Dept: DERMATOLOGY | Facility: CLINIC | Age: 47
End: 2022-10-26
Payer: COMMERCIAL

## 2022-10-26 VITALS — DIASTOLIC BLOOD PRESSURE: 80 MMHG | SYSTOLIC BLOOD PRESSURE: 124 MMHG | HEART RATE: 76 BPM

## 2022-10-26 DIAGNOSIS — C44.311 BASAL CELL CARCINOMA (BCC) OF LEFT NASAL SIDEWALL: Primary | ICD-10-CM

## 2022-10-26 PROCEDURE — 17311 MOHS 1 STAGE H/N/HF/G: CPT | Mod: GC | Performed by: DERMATOLOGY

## 2022-10-26 PROCEDURE — 14060 TIS TRNFR E/N/E/L 10 SQ CM/<: CPT | Mod: GC | Performed by: DERMATOLOGY

## 2022-10-26 ASSESSMENT — PAIN SCALES - GENERAL: PAINLEVEL: NO PAIN (0)

## 2022-10-26 NOTE — LETTER
10/26/2022       RE: Marjorie Wolf  [address] [facility] [address]     Dear Colleague,    Thank you for referring your patient, Marjorie Wolf, to the [facility] DERMATOLOGIC SURGERY CLINIC [place] at [facility]. Please see a copy of my visit note below.    [facility] Mohs Surgery Procedure Note    Case #: 1  Date of Service:  Oct 26, 2022  Surgery: Mohs micrographic surgery (MMS)  Staff surgeon: Vishnu Evans MD  Fellow surgeon: Jaleel Marlow MD  Resident surgeon: Adithya Carpenter MD  Nurse: Amarilis Mejias, BOSTON    Tumor Type: BCC  Location: Left nasal sidewall  Derm-Path Accession #: [id number]    Mohs Accession #:   Pre-Op Size: 1.0 cm x 1.0 cm  Final Defect Size: 1.2 cm x 1.1 cm  Number of Mohs stages: 1  Level of Defect: Perichondrium  Local anesthetic: 6 mL 1% lidocaine with epinephrine  Repair Type: Advancement flap  Repair Size: 3.0 cm x 1.5 cm  Suture Material: 4-0 Monocryl; 5-0 fast absorbing gut    Procedure:    Stage I  We discussed the principles of treatment and most likely complications including scarring, bleeding, infection, swelling, pain, crusting, nerve damage, large wound,  incomplete excision, wound dehiscence,  nerve damage, recurrence, and a second procedure may be recommended to obtain the best cosmetic or functional result.    Informed consent was obtained and the patient underwent the procedure as follows:  The patient was placed supine on the operating table.  The cancer was identified, outlined with a marker, and verified by the patient.  The entire surgical field was prepped with chlorhexidine.  The surgical site was anesthetized using lidocaine with epinephrine.    The area of clinically apparent tumor was debulked. The layer of tissue was then surgically excised using a #15 blade and was then transferred onto a specimen sheet maintaining the orientation of the specimen. Hemostasis was  obtained using electrocoagulation. The wound site was then covered with a dressing while the tissue samples were processed for examination.    The excised tissue was transported to the Mohs histology laboratory maintaining the tissue [id number].  The tissue specimen was relaxed so that the entire surgical margin was in a a single horizontal plane for sectioning and inked for precise mapping.  A precise reference map was drawn to reflect the sectioning of the specimen, colored inking of the margins, and orientation on the patient.  The tissue was processed using horizontal sectioning of the base and continuous peripheral margins.  The histopathologic sections were reviewed in conjunction with the reference map.    Total blocks: 1  Total slides: 2    There were no cancer cells visualized on examination, therefore Mohs surgery was complete.    Island Pedicle Flap (Nasalis sling flap)    PROCEDURE:  The patient was taken to the operative suite and placed recumbent position on the operating room table.  The wound was identified and the planned Nasalis sling flap island pedicle reconstruction was outlined with a marker.  The area was then infiltrated with 1% lidocaine with epinephrine.  The area was then prepped in a sterile fashion using Chlorhexidine and rinsed with sterile saline and sterile drapes were placed.  The wound was debeveled and undermined within the supraperichondrial plane.The flap was incised with a #15 scalpel, down to the perichondrium over the medial and inferior aspect of the flap, and superficially over the lateral aspect. Laterally, bilevel undermining was then performed, with planes both above and below nasalis muscle fibers. The nasalis musculocutaneous pedicle was narrowed and released thoughtfully until appropriate movement was obtained; an appropriately sized pedical remained at the completion of this step (>1/3 of lateral margin).   Hemostasis was obtained with electrocoagulation.  The flap  was then advanced into the defect and secured with buried dermal 4-0 monocryl sutures.  The wound edges were then carefully approximated using simple running 5-0 fast absorbing gut epiderma sutures.  The wound was cleansed with saline and ointment was applied.  A non-adherent pressure dressing was placed. Wound care was reviewed verbally and in writing.  The patient left the operative suite in stable condition.    Vishnu Evans MD was always immediately available.     Dr. Jaleel Marlow (Mohs micrographic surgery fellow) performed the Mohs micrographic surgery and reconstruction under the direct supervision of Vishnu Evans MD, who was present for the entire micrographic surgery and key portions of the reconstruction, and always immediately available.    Jaleel Marlow MD  Dermatology, PGY-5  Mohs surgery fellow    Scribe Disclosure:  I, Roney Velasco, am serving as a scribe to document services personally performed by Vishnu Evans MD based on data collection and the provider's statements to me.       Attending attestation:  I was present for key elements of the procedure and immediately available for all other portions of the procedure.  I have reviewed the note and edited it as necessary.    Vishnu Evans M.D.  Professor  Director of Dermatologic Surgery  Department of Dermatology  [facility]    Dermatology Surgery Clinic  [facility]  [address] [place] [address]      Pressure bandage applied to surgery site(s). Provided patient with verbal wound care instruction and printed instructions. Patient given wound care supplies including Vaseline, Telfa non-stick gauze, gauze, and tape. Answered all concerns/questions regarding wound care and provided clinic phone number to call with concerns.

## 2022-10-26 NOTE — PATIENT INSTRUCTIONS

## 2022-10-26 NOTE — PROGRESS NOTES
Pressure bandage applied to surgery site(s). Provided patient with verbal wound care instruction and printed instructions. Patient given wound care supplies including Vaseline, Telfa non-stick gauze, gauze, and tape. Answered all concerns/questions regarding wound care and provided clinic phone number to call with concerns.

## 2022-10-26 NOTE — NURSING NOTE
Chief Complaint   Patient presents with     Derm Problem     Mohs L nasal sidewall     Amarilis MARX, EMT  Dermatology/Dermatology Surgery  [phone]

## 2022-10-26 NOTE — PROGRESS NOTES
[facility] Mohs Surgery Procedure Note    Case #: 1  Date of Service:  Oct 26, 2022  Surgery: Mohs micrographic surgery (MMS)  Staff surgeon: Vishnu Evans MD  Fellow surgeon: Jaleel Marlow MD  Resident surgeon: Adithya Carpenter MD  Nurse: Amarilis Mejias, B[place]ON    Tumor Type: BCC  Location: Left nasal sidewall  Derm-Path Accession #: [id number]    Mohs Accession #:   Pre-Op Size: 1.0 cm x 1.0 cm  Final Defect Size: 1.2 cm x 1.1 cm  Number of Mohs stages: 1  Level of Defect: Perichondrium  Local anesthetic: 6 mL 1% lidocaine with epinephrine  Repair Type: Advancement flap  Repair Size: 3.0 cm x 1.5 cm  Suture Material: 4-0 Monocryl; 5-0 fast absorbing gut    Procedure:    Stage I  We discussed the principles of treatment and most likely complications including scarring, bleeding, infection, swelling, pain, crusting, nerve damage, large wound,  incomplete excision, wound dehiscence,  nerve damage, recurrence, and a second procedure may be recommended to obtain the best cosmetic or functional result.    Informed consent was obtained and the patient underwent the procedure as follows:  The patient was placed supine on the operating table.  The cancer was identified, outlined with a marker, and verified by the patient.  The entire surgical field was prepped with chlorhexidine.  The surgical site was anesthetized using lidocaine with epinephrine.    The area of clinically apparent tumor was debulked. The layer of tissue was then surgically excised using a #15 blade and was then transferred onto a specimen sheet maintaining the orientation of the specimen. Hemostasis was obtained using electrocoagulation. The wound site was then covered with a dressing while the tissue samples were processed for examination.    The excised tissue was transported to the Mohs histology laboratory maintaining the tissue [id number].  The tissue specimen was relaxed so that the entire surgical margin was in a a single  horizontal plane for sectioning and inked for precise mapping.  A precise reference map was drawn to reflect the sectioning of the specimen, colored inking of the margins, and orientation on the patient.  The tissue was processed using horizontal sectioning of the base and continuous peripheral margins.  The histopathologic sections were reviewed in conjunction with the reference map.    Total blocks: 1  Total slides: 2    There were no cancer cells visualized on examination, therefore Mohs surgery was complete.    Island Pedicle Flap (Nasalis sling flap)    PROCEDURE:  The patient was taken to the operative suite and placed recumbent position on the operating room table.  The wound was identified and the planned Nasalis sling flap island pedicle reconstruction was outlined with a marker.  The area was then infiltrated with 1% lidocaine with epinephrine.  The area was then prepped in a sterile fashion using Chlorhexidine and rinsed with sterile saline and sterile drapes were placed.  The wound was debeveled and undermined within the supraperichondrial plane.The flap was incised with a #15 scalpel, down to the perichondrium over the medial and inferior aspect of the flap, and superficially over the lateral aspect. Laterally, bilevel undermining was then performed, with planes both above and below nasalis muscle fibers. The nasalis musculocutaneous pedicle was narrowed and released thoughtfully until appropriate movement was obtained; an appropriately sized pedical remained at the completion of this step (>1/3 of lateral margin).   Hemostasis was obtained with electrocoagulation.  The flap was then advanced into the defect and secured with buried dermal 4-0 monocryl sutures.  The wound edges were then carefully approximated using simple running 5-0 fast absorbing gut epiderma sutures.  The wound was cleansed with saline and ointment was applied.  A non-adherent pressure dressing was placed. Wound care was reviewed  verbally and in writing.  The patient left the operative suite in stable condition.    Vishnu Evans MD was always immediately available.     Dr. Jaleel Marlow (Mohs micrographic surgery fellow) performed the Mohs micrographic surgery and reconstruction under the direct supervision of Vishnu Evans MD, who was present for the entire micrographic surgery and key portions of the reconstruction, and always immediately available.    Jaleel Marlow MD  Dermatology, PGY-5  Mohs surgery fellow    Scribe Disclosure:  I, Roney Velasco, am serving as a scribe to document services personally performed by Vishnu Evans MD based on data collection and the provider's statements to me.       Attending attestation:  I was present for key elements of the procedure and immediately available for all other portions of the procedure.  I have reviewed the note and edited it as necessary.    Vishnu Evans M.D.  Professor  Director of Dermatologic Surgery  Department of Dermatology  [facility]    Dermatology Surgery Clinic  [facility] [place] [phone]

## 2022-10-31 ENCOUNTER — E-VISIT (OUTPATIENT)
Dept: FAMILY MEDICINE | Facility: CLINIC | Age: 47
End: 2022-10-31
Payer: COMMERCIAL

## 2022-10-31 DIAGNOSIS — Z53.9 ERRONEOUS ENCOUNTER--DISREGARD: Primary | ICD-10-CM

## 2022-11-02 ENCOUNTER — OFFICE VISIT (OUTPATIENT)
Dept: FAMILY MEDICINE | Facility: CLINIC | Age: 47
End: 2022-11-02
Payer: COMMERCIAL

## 2022-11-02 VITALS
HEART RATE: 89 BPM | TEMPERATURE: 97.3 F | WEIGHT: 182.2 LBS | BODY MASS INDEX: 25.77 KG/M2 | DIASTOLIC BLOOD PRESSURE: 77 MMHG | SYSTOLIC BLOOD PRESSURE: 127 MMHG | OXYGEN SATURATION: 98 %

## 2022-11-02 DIAGNOSIS — J20.9 ACUTE BRONCHITIS WITH SYMPTOMS > 10 DAYS: Primary | ICD-10-CM

## 2022-11-02 PROCEDURE — 99213 OFFICE O/P EST LOW 20 MIN: CPT | Performed by: FAMILY MEDICINE

## 2022-11-02 RX ORDER — PREDNISONE 20 MG/1
20 TABLET ORAL DAILY
Qty: 5 TABLET | Refills: 0 | Status: SHIPPED | OUTPATIENT
Start: 2022-11-02 | End: 2023-01-27

## 2022-11-02 RX ORDER — DOXYCYCLINE HYCLATE 100 MG
100 TABLET ORAL 2 TIMES DAILY
Qty: 14 TABLET | Refills: 0 | Status: SHIPPED | OUTPATIENT
Start: 2022-11-02 | End: 2023-01-27

## 2022-11-02 NOTE — PROGRESS NOTES
Assessment & Plan     Acute bronchitis with symptoms > 10 days  On exam - expiratory wheezes   Will treat with abx and prednisone as >10 days of symptoms and not improving or resolving  Advised warning signs and symptoms  Pt will call or RTC if symptoms worsen or do not improve.   - doxycycline hyclate (VIBRA-TABS) 100 MG tablet; Take 1 tablet (100 mg) by mouth 2 times daily  - predniSONE (DELTASONE) 20 MG tablet; Take 1 tablet (20 mg) by mouth daily                 No follow-ups on file.    Jeanette Rodrigues DO  [facility]    Fredrick Amador is a 47 year old, presenting for the following health issues:  Cough      History of Present Illness       Reason for visit:  Upper respiratory symptoms  Symptom onset:  1-2 weeks ago  Symptoms include:  Cough, runny nose, headache  Symptom intensity:  Severe  Symptom progression:  Staying the same  Had these symptoms before:  Yes  Has tried/received treatment for these symptoms:  Yes    Marjorie Wolf eats 4 or more servings of fruits and vegetables daily.Marjorie Wolf consumes 0 sweetened beverage(s) daily.Marjorie Wolf exercises with enough effort to increase Marjorie Wolf's heart rate 30 to 60 minutes per day.  Marjorie Wolf exercises with enough effort to increase Marjorie Wolf's heart rate 3 or less days per week.   Marjorie Wolf is taking medications regularly.       Acute Illness  Acute illness concerns:   Onset/Duration: 1-2 weeks ago   Symptoms:  Fever: No  Chills/Sweats: No  Headache (location?): YES- OVER EYES   Sinus Pressure: No  Conjunctivitis:  No  Ear Pain: no  Rhinorrhea: YES  Congestion: YES  Sore Throat: YES- sLIGHT  Cough: YES  Wheeze: No  Decreased Appetite: No  Nausea: No  Vomiting: No  Diarrhea: No  Dysuria/Freq.: No  Dysuria or Hematuria: No  Fatigue/Achiness: YES- Fatigue   Sick/Strep Exposure: No  Therapies tried and outcome: Tylenol       Review of Systems   Constitutional, HEENT,  cardiovascular, pulmonary, gi and gu systems are negative, except as otherwise noted.      Objective    /77   Pulse 89   Temp 97.3  F (36.3  C) (Temporal)   Wt 82.6 kg (182 lb 3.2 oz)   LMP 07/05/2020 (Exact Date)   SpO2 98%   BMI 25.77 kg/m    Body mass index is 25.77 kg/m .  Physical Exam   GENERAL: alert and no distress  HENT: normal cephalic/atraumatic and ear canals and TM's normal  NECK: no adenopathy, no asymmetry, masses, or scars and thyroid normal to palpation  RESP: expiratory wheezes throughout  CV: regular rate and rhythm, normal S1 S2, no S3 or S4, no murmur, click or rub, no peripheral edema and peripheral pulses strong

## 2022-11-04 ENCOUNTER — TELEPHONE (OUTPATIENT)
Dept: GASTROENTEROLOGY | Facility: CLINIC | Age: 47
End: 2022-11-04

## 2022-11-04 NOTE — TELEPHONE ENCOUNTER
Caller: Marjorie Wolf      Procedure: COLONOSCOPY    Date, Location, and [facility] Procedure [facility]    Ordering Provider:Jeanette Rodrigues, DO    Reason for cancel (please be detailed, any staff messages or encounters to note?): PATIENT SCHEDULING CONFLICT        Rescheduled: Y     If rescheduled:    Date: 1/5   Location: [facility]   Prep Resent: RESENT(changes to prep?)   Covid Test Rescheduled: HOME TEST   Note any change or update to original order/sedation: N/A

## 2022-11-11 ENCOUNTER — MYC MEDICAL ADVICE (OUTPATIENT)
Dept: DERMATOLOGY | Facility: CLINIC | Age: 47
End: 2022-11-11

## 2022-12-21 ENCOUNTER — TELEPHONE (OUTPATIENT)
Dept: SURGERY | Facility: AMBULATORY SURGERY CENTER | Age: 47
End: 2022-12-21

## 2022-12-21 DIAGNOSIS — Z12.11 ENCOUNTER FOR SCREENING COLONOSCOPY: Primary | ICD-10-CM

## 2022-12-21 RX ORDER — BISACODYL 5 MG
TABLET, DELAYED RELEASE (ENTERIC COATED) ORAL
Qty: 4 TABLET | Refills: 0 | Status: SHIPPED | OUTPATIENT
Start: 2022-12-21 | End: 2022-12-27 | Stop reason: ALTCHOICE

## 2022-12-21 NOTE — TELEPHONE ENCOUNTER
Attempted to contact patient regarding upcoming colonoscopy  procedure on 01.05.2023 for pre assessment questions. No answer.     Left message to return call to [phone] #[phone]    Discuss Covid policy.     Pre op exam scheduled: N/A    Arrival time: 1015    [facility]; [address] [place] [address], [address] [place], [place] [address]    Sedation type: MAC    Anticoagulants: No    Electronic implanted devices? No    Diabetic? No    Indication for procedure: Screening    Patient is on Mobic    Bowel prep recommendation: Standard Golytely     Prep instructions sent via [employer]. Bowel prep script sent to [id number] - [place], [place] - [address] [facility]      Anuja Hugo RN

## 2022-12-27 RX ORDER — BISACODYL 5 MG/1
TABLET, DELAYED RELEASE ORAL
Qty: 4 TABLET | Refills: 0 | Status: SHIPPED | OUTPATIENT
Start: 2022-12-27 | End: 2023-01-05

## 2022-12-27 NOTE — TELEPHONE ENCOUNTER
2nd attempt    Attempted to contact patient regarding upcoming colonoscopy  procedure on 1.5.23 for pre assessment questions. No answer.     Left message to return call to [phone] #4    Preclickhart message sent.    Yee Stiles RN

## 2022-12-27 NOTE — TELEPHONE ENCOUNTER
Pre assessment questions completed for upcoming colonoscopy  procedure scheduled on 1/5/23    COVID policy reviewed.     Reviewed procedural arrival time 1015 and facility location [facility]; [address] [facility] [address], [address], [place] [address]    Designated  policy reviewed. Instructed to have someone stay 24 hours post procedure.     Anticoagulation/blood thinners? No    Electronic implanted devices? No    Bowel prep recommendation: Extended prep Golytely  Switched to Extended due to the Pt reporting chronic constipation that she takes daily Miralax for. New script and prep instructions sent.     Reviewed procedure prep instructions.     Prep instructions sent via "[employer].  Bowel prep script sent to [id number] - [place], [place] - [address]     Patient verbalized understanding and had no questions or concerns at this time.    Arianna Soliman RN

## 2023-01-04 ENCOUNTER — ANESTHESIA EVENT (OUTPATIENT)
Dept: SURGERY | Facility: AMBULATORY SURGERY CENTER | Age: 48
End: 2023-01-04
Payer: COMMERCIAL

## 2023-01-05 ENCOUNTER — HOSPITAL ENCOUNTER (OUTPATIENT)
Facility: AMBULATORY SURGERY CENTER | Age: 48
Discharge: HOME | End: 2023-01-05
Attending: STUDENT IN AN ORGANIZED HEALTH CARE EDUCATION/TRAINING PROGRAM
Payer: COMMERCIAL

## 2023-01-05 ENCOUNTER — ANESTHESIA (OUTPATIENT)
Dept: SURGERY | Facility: AMBULATORY SURGERY CENTER | Age: 48
End: 2023-01-05
Payer: COMMERCIAL

## 2023-01-05 VITALS
HEART RATE: 82 BPM | TEMPERATURE: 97.2 F | OXYGEN SATURATION: 98 % | SYSTOLIC BLOOD PRESSURE: 116 MMHG | DIASTOLIC BLOOD PRESSURE: 71 MMHG | HEIGHT: 71 IN | BODY MASS INDEX: 25.48 KG/M2 | WEIGHT: 182 LBS | RESPIRATION RATE: 16 BRPM

## 2023-01-05 VITALS — HEART RATE: 81 BPM

## 2023-01-05 LAB — COLONOSCOPY: NORMAL

## 2023-01-05 PROCEDURE — 88305 TISSUE EXAM BY PATHOLOGIST: CPT | Mod: 26 | Performed by: STUDENT IN AN ORGANIZED HEALTH CARE EDUCATION/TRAINING PROGRAM

## 2023-01-05 PROCEDURE — 45385 COLONOSCOPY W/LESION REMOVAL: CPT | Mod: 33

## 2023-01-05 PROCEDURE — 88305 TISSUE EXAM BY PATHOLOGIST: CPT | Mod: TC | Performed by: STUDENT IN AN ORGANIZED HEALTH CARE EDUCATION/TRAINING PROGRAM

## 2023-01-05 RX ORDER — LIDOCAINE 40 MG/G
CREAM TOPICAL
Status: DISCONTINUED | OUTPATIENT
Start: 2023-01-05 | End: 2023-01-05 | Stop reason: HOSPADM

## 2023-01-05 RX ORDER — FLUMAZENIL 0.1 MG/ML
0.2 INJECTION, SOLUTION INTRAVENOUS
Status: DISCONTINUED | OUTPATIENT
Start: 2023-01-05 | End: 2023-01-06 | Stop reason: HOSPADM

## 2023-01-05 RX ORDER — NALOXONE HYDROCHLORIDE 0.4 MG/ML
0.4 INJECTION, SOLUTION INTRAMUSCULAR; INTRAVENOUS; SUBCUTANEOUS
Status: DISCONTINUED | OUTPATIENT
Start: 2023-01-05 | End: 2023-01-06 | Stop reason: HOSPADM

## 2023-01-05 RX ORDER — NALOXONE HYDROCHLORIDE 0.4 MG/ML
0.2 INJECTION, SOLUTION INTRAMUSCULAR; INTRAVENOUS; SUBCUTANEOUS
Status: DISCONTINUED | OUTPATIENT
Start: 2023-01-05 | End: 2023-01-06 | Stop reason: HOSPADM

## 2023-01-05 RX ORDER — PROCHLORPERAZINE MALEATE 10 MG
10 TABLET ORAL EVERY 6 HOURS PRN
Status: DISCONTINUED | OUTPATIENT
Start: 2023-01-05 | End: 2023-01-06 | Stop reason: HOSPADM

## 2023-01-05 RX ORDER — ONDANSETRON 4 MG/1
4 TABLET, ORALLY DISINTEGRATING ORAL EVERY 6 HOURS PRN
Status: DISCONTINUED | OUTPATIENT
Start: 2023-01-05 | End: 2023-01-06 | Stop reason: HOSPADM

## 2023-01-05 RX ORDER — PROPOFOL 10 MG/ML
INJECTION, EMULSION INTRAVENOUS PRN
Status: DISCONTINUED | OUTPATIENT
Start: 2023-01-05 | End: 2023-01-05

## 2023-01-05 RX ORDER — PROPOFOL 10 MG/ML
INJECTION, EMULSION INTRAVENOUS CONTINUOUS PRN
Status: DISCONTINUED | OUTPATIENT
Start: 2023-01-05 | End: 2023-01-05

## 2023-01-05 RX ORDER — ONDANSETRON 2 MG/ML
4 INJECTION INTRAMUSCULAR; INTRAVENOUS EVERY 6 HOURS PRN
Status: DISCONTINUED | OUTPATIENT
Start: 2023-01-05 | End: 2023-01-06 | Stop reason: HOSPADM

## 2023-01-05 RX ORDER — ONDANSETRON 2 MG/ML
4 INJECTION INTRAMUSCULAR; INTRAVENOUS
Status: DISCONTINUED | OUTPATIENT
Start: 2023-01-05 | End: 2023-01-05 | Stop reason: HOSPADM

## 2023-01-05 RX ORDER — SODIUM CHLORIDE, SODIUM LACTATE, POTASSIUM CHLORIDE, CALCIUM CHLORIDE 600; 310; 30; 20 MG/100ML; MG/100ML; MG/100ML; MG/100ML
INJECTION, SOLUTION INTRAVENOUS CONTINUOUS
Status: DISCONTINUED | OUTPATIENT
Start: 2023-01-05 | End: 2023-01-05 | Stop reason: HOSPADM

## 2023-01-05 RX ADMIN — SODIUM CHLORIDE, SODIUM LACTATE, POTASSIUM CHLORIDE, CALCIUM CHLORIDE: 600; 310; 30; 20 INJECTION, SOLUTION INTRAVENOUS at 11:16

## 2023-01-05 RX ADMIN — PROPOFOL 50 MG: 10 INJECTION, EMULSION INTRAVENOUS at 11:37

## 2023-01-05 RX ADMIN — PROPOFOL 200 MCG/KG/MIN: 10 INJECTION, EMULSION INTRAVENOUS at 11:37

## 2023-01-05 RX ADMIN — PROPOFOL 50 MG: 10 INJECTION, EMULSION INTRAVENOUS at 11:36

## 2023-01-05 ASSESSMENT — ENCOUNTER SYMPTOMS: ORTHOPNEA: 0

## 2023-01-05 ASSESSMENT — LIFESTYLE VARIABLES: TOBACCO_USE: 0

## 2023-01-05 ASSESSMENT — COPD QUESTIONNAIRES: COPD: 0

## 2023-01-05 NOTE — ANESTHESIA PREPROCEDURE EVALUATION
Anesthesia Pre-Procedure Evaluation    Patient: Marjorie Wolf   MRN: [id number] : 1975        Procedure : Procedure(s):  COLONOSCOPY, WITH POLYPECTOMY          Past Medical History:   Diagnosis Date     Allergic rhinitis due to other allergen      Arthritis      BCC (basal cell carcinoma), face 2022     Insomnia     benadryl nightly (50mg), se's on trazodone     Mild intermittent asthma     uses maxair 2-3x/yr     Personal history of other diseases of circulatory system     Virally induced stroke- high fever, resolved completely     SVT (supraventricular tachycardia) (H)      Thyroid disease May 2014    Hypothyroidism      Past Surgical History:   Procedure Laterality Date     ENT SURGERY      2 surgeries for deviated septum     SURGICAL HISTORY OF -       wisdom teeth extraction     ZZC APPENDECTOMY  1989     ZZC NONSPECIFIC PROCEDURE  1990    Nasal surgery for deviated septum     ZZC NONSPECIFIC PROCEDURE  1990    Nasal Surgery for deviated septum      Allergies   Allergen Reactions     Erythromycin Rash     Penicillins Rash     Sulfa Drugs Rash      Social History     Tobacco Use     Smoking status: Never     Smokeless tobacco: Never   Substance Use Topics     Alcohol use: Yes     Alcohol/week: 0.0 standard drinks     Comment: Occas.      Wt Readings from Last 1 Encounters:   23 82.6 kg (182 lb)        Anesthesia Evaluation   Pt has had prior anesthetic. Type: General.    No history of anesthetic complications       ROS/MED HX  ENT/Pulmonary:     (+) Intermittent, asthma (Only needs inhalers with bronchitis) Treatment: Inhaler prn,   (-) tobacco use, COPD and recent URI   Neurologic:       Cardiovascular:    (-) DUFFY, orthopnea/PND and syncope   METS/Exercise Tolerance: >4 METS    Hematologic:       Musculoskeletal:       GI/Hepatic:    (-) GERD   Renal/Genitourinary:       Endo:     (+) thyroid problem (Hashimotos. Last FT4 wnl.),     Psychiatric/Substance Use:        Infectious Disease:       Malignancy:       Other:            Physical Exam    Airway        Mallampati: II   TM distance: > 3 FB   Neck ROM: full   Mouth opening: > 3 cm    Respiratory Devices and Support         Dental  no notable dental history         Cardiovascular          Rhythm and rate: regular and normal     Pulmonary           breath sounds clear to auscultation           OUTSIDE LABS:  CBC:   Lab Results   Component Value Date    WBC 5.4 06/26/2019    WBC 5.7 10/06/2016    HGB 14.4 06/26/2019    HGB 14.0 10/06/2016    HCT 43.2 06/26/2019    HCT 40.8 10/06/2016     06/26/2019     10/06/2016     BMP:   Lab Results   Component Value Date     10/05/2022     09/24/2021    POTASSIUM 4.1 10/05/2022    POTASSIUM 3.7 09/24/2021    CHLORIDE 106 10/05/2022    CHLORIDE 108 09/24/2021    CO2 28 10/05/2022    CO2 26 09/24/2021    BUN 11 10/05/2022    BUN 8 09/24/2021    CR 0.72 10/05/2022    CR 0.80 09/24/2021    GLC 89 10/05/2022    GLC 90 09/24/2021     COAGS: No results found for: PTT, INR, FIBR  POC: No results found for: BGM, HCG, HCGS  HEPATIC:   Lab Results   Component Value Date    ALBUMIN 3.9 10/05/2022    PROTTOTAL 7.7 10/05/2022    ALT 31 10/05/2022    AST 27 10/05/2022    ALKPHOS 116 10/05/2022    BILITOTAL 0.4 10/05/2022     OTHER:   Lab Results   Component Value Date    MICAH 9.3 10/05/2022    MAG 2.4 (H) 09/23/2020    TSH 4.13 (H) 10/05/2022    T4 0.62 10/05/2022    CRP 6.7 10/22/2010    SED 8 10/22/2010       Anesthesia Plan    ASA Status:  2   NPO Status:  NPO Appropriate    Anesthesia Type: MAC.     - Reason for MAC: straight local not clinically adequate              Consents    Anesthesia Plan(s) and associated risks, benefits, and realistic alternatives discussed. Questions answered and patient/representative(s) expressed understanding.    - Discussed:     - Discussed with:  Patient         Postoperative Care            Comments:    Other Comments: Discussed plan for MAC,  including possibility of awareness, risk of aspiration pneumonia, risk of hypoxia/low oxygen/airway obstruction requiring additional airway support/devices. Discussed alternatives. Discussed backup plan of general anesthesia and intubation. Ensured understanding, invited questions and all questions were answered.       H&P reviewed: Unable to attach H&P to encounter due to EHR limitations. H&P Update: appropriate H&P reviewed, patient examined. No interval changes since H&P (within 30 days).         Pearl Robles MD

## 2023-01-05 NOTE — LETTER
January 6, 2023      Marjorie Wolf  [address] [facility] [address]        Dear  Luciano,    We are writing to inform you of your test results.    The polyp removed during your recent colonoscopy was found to be an adenoma - this is considered to be a precancerous polyp.  Please note there was NO cancer in the polyp.     Given the finding of this type of polyp, you are at higher risk for growing precancerous polyps in the future. I recommend that you undergo a repeat colonoscopy in five years. However, a sooner examination might be necessary if you start developing any symptoms such as rectal bleeding, change in bowel habits, anemia, etc.  Please discuss this with your primary physician at the time of your next office appointment.  Your primary physician will schedule this repeat colonoscopy at the appropriate time.    Please share this information with your family members so they can discuss with their primary physician their need for colorectal cancer screening.      It has been a pleasure to participate in your care.  Please call our clinic if you have any questions or concerns.          Resulted Orders   Surgical Pathology Exam   Result Value Ref Range    Case Report       Surgical Pathology Report                         Case: [id number]                                  Authorizing Provider:  Vesta Conner MD          Collected:           01/05/2023 12:07 PM          Ordering Location:     [facility] OR  Received:            01/05/2023 12:41 PM                                 [place]                                                                  Pathologist:           Ree Peter MD                                                          Specimen:    Other, Descending Colon Polyp                                                              Final Diagnosis       A. DESCENDING COLON, POLYP, POLYPECTOMY:  - Sessile serrated adenoma; negative for cytologic dysplasia          "Clinical Information       Screen for colon cancer      Gross Description       A(1). Other, Descending Colon Polyp:  The specimen is received in formalin with proper patient identification, labeled \"descending colon polyp\".  The specimen consists of a 1.2 x 0.6 x 0.1 cm tan flattened polyp with diffuse petechial hemorrhage.  The resection margin is inked blue.  The specimen is bisected and entirely submitted in cassette A1.       Microscopic Description       Microscopic examination has been performed.         Performing Labs       The technical component of this testing was completed at [facility]      Case Images         If you have any questions or concerns, please call the clinic at the number listed above.       Sincerely,      Vesta Conner MD          "

## 2023-01-05 NOTE — H&P
Marjorie Wolf  [id number]  adult  47 year old      Reason for procedure/surgery: screening colonoscopy, no known family history    Patient Active Problem List   Diagnosis     Allergic rhinitis due to pollen     Family history of other cardiovascular diseases     Low back pain     Hypertriglyceridemia     CARDIOVASCULAR SCREENING; LDL GOAL LESS THAN 160     Intermittent asthma     Insomnia     SVT (supraventricular tachycardia) (H)     Family history of hemochromatosis     Hashimoto's thyroiditis     Thyroid enlargement     Thyroid nodule     ACP (advance care planning)     BCC (basal cell carcinoma), face       Past Surgical History:    Past Surgical History:   Procedure Laterality Date     ENT SURGERY  1990    2 surgeries for deviated septum     SURGICAL HISTORY OF -   2000    wisdom teeth extraction     Z APPENDECTOMY  4/1989     Z NONSPECIFIC PROCEDURE  6/1990    Nasal surgery for deviated septum     Z NONSPECIFIC PROCEDURE  11/1990    Nasal Surgery for deviated septum       Past Medical History:   Past Medical History:   Diagnosis Date     Allergic rhinitis due to other allergen      Arthritis      BCC (basal cell carcinoma), face 9/28/2022     Insomnia     benadryl nightly (50mg), se's on trazodone     Mild intermittent asthma     uses maxair 2-3x/yr     Personal history of other diseases of circulatory system 1995    Virally induced stroke- high fever, resolved completely     SVT (supraventricular tachycardia) (H)      Thyroid disease May 2014    Hypothyroidism       Social History:   Social History     Tobacco Use     Smoking status: Never     Smokeless tobacco: Never   Substance Use Topics     Alcohol use: Yes     Alcohol/week: 0.0 standard drinks     Comment: Occas.       Family History:   Family History   Problem Relation Age of Onset     Diabetes Mother         Type 2     Arthritis Mother      Depression Mother         anxiety/panic attacks as well     Obesity Mother      Thyroid Disease Mother          dx ~35yrs     Hypertension Mother      Hyperlipidemia Mother      Skin Cancer Mother      C.A.D. Father         MI at age 54     Gastrointestinal Disease Father         Ulcer H-Pylori?     Lipids Father      Hypertension Father      Hyperlipidemia Father      Genetic Disorder Father      Arthritis Maternal Grandmother      C.A.D. Maternal Grandfather         Artherosclerosis- cause of death, unsure if previous MI     Diabetes Maternal Grandfather         Type 2     Eye Disorder Maternal Grandfather         Glaucoma     Arthritis Sister      Depression Sister      Neurologic Disorder Sister         Migraines     Lipids Sister      Cerebrovascular Disease No family hx of      Breast Cancer No family hx of         Mother had biopsy 2016, found non-malignant     Cancer - colorectal No family hx of        Allergies:   Allergies   Allergen Reactions     Erythromycin Rash     Penicillins Rash     Sulfa Drugs Rash       Active Medications:   Current Outpatient Medications   Medication Sig Dispense Refill     amitriptyline (ELAVIL) 25 MG tablet TAKE 3 TABLETS BY MOUTH NIGHTLY AS NEEDED FOR SLEEP 270 tablet 3     doxycycline hyclate (VIBRA-TABS) 100 MG tablet Take 1 tablet (100 mg) by mouth 2 times daily 14 tablet 0     estradiol (VAGIFEM) 10 MCG TABS vaginal tablet Place 1 tablet (10 mcg) vaginally twice a week 8 tablet 5     Lactase (LACTAID PO)        levalbuterol (XOPENEX HFA) 45 MCG/ACT inhaler Inhale 2 puffs into the lungs every 4 hours as needed for shortness of breath / dyspnea or wheezing 15 g 2     meloxicam (MOBIC) 15 MG tablet TAKE 1 TABLET BY MOUTH EVERY DAY 90 tablet 0     predniSONE (DELTASONE) 20 MG tablet Take 1 tablet (20 mg) by mouth daily 5 tablet 0     tiZANidine (ZANAFLEX) 4 MG tablet Take 1 tablet (4 mg) by mouth nightly as needed for muscle spasms 90 tablet 3     UNKNOWN MED DOSAGE digestive advantage for lactose intolorance - 1 tab daily       venlafaxine (EFFEXOR XR) 75 MG 24 hr capsule Take  "1 capsule (75 mg) by mouth daily 90 capsule 1     VITAMIN D, CHOLECALCIFEROL, PO Take by mouth daily        bisacodyl (DULCOLAX) 5 MG EC tablet Two days prior to exam take two (2) tablets at 4pm. One day prior to exam take two (2) tablets at 4pm 4 tablet 0     polyethylene glycol (GOLYTELY) 236 g suspension Take as directed. Two days before your exam fill the first container with water. Cover and shake until mixed well. At 5:00pm drink one 8oz glass every 10-15 minutes until half (1/2) of the first container is empty. Store the remainder in the refrigerator. One day before your exam at 5:00pm drink the second half of the first container until it is gone. Before you go to bed mix the second container with water and put in refrigerator. Six hours before your check in time drink one 8oz glass every 10-15 minutes until half of container is empty. Discard the remainder of solution. 8000 mL 0       Systemic Review:   CONSTITUTIONAL: NEGATIVE for fever, chills, change in weight  ENT/MOUTH: NEGATIVE for ear, mouth and throat problems  RESP: NEGATIVE for significant cough or SOB  CV: NEGATIVE for chest pain, palpitations or peripheral edema    Physical Examination:   Vital Signs: /78   Temp 97.9  F (36.6  C) (Temporal)   Resp 16   Ht 1.791 m (5' 10.51\")   Wt 82.6 kg (182 lb)   LMP 07/05/2020 (Exact Date)   SpO2 99%   BMI 25.74 kg/m    GENERAL: healthy, alert and no distress  NECK: no adenopathy, no asymmetry, masses, or scars  RESP: lungs clear to auscultation - no rales, rhonchi or wheezes  CV: regular rate and rhythm, normal S1 S2, no S3 or S4, no murmur, click or rub, no peripheral edema and peripheral pulses strong  ABDOMEN: soft, nontender, no hepatosplenomegaly, no masses and bowel sounds normal  MS: no gross musculoskeletal defects noted, no edema    Plan: Appropriate to proceed as scheduled.      Vesta Conner MD  1/5/2023    PCP:  Jeanette Rodrigues"

## 2023-01-05 NOTE — ANESTHESIA POSTPROCEDURE EVALUATION
Patient: Marjorie Wolf    Procedure: Procedure(s):  COLONOSCOPY, WITH POLYPECTOMY       Anesthesia Type:  MAC    Note:  Disposition: Outpatient   Postop Pain Control: Uneventful            Sign Out: Well controlled pain   PONV: No   Neuro/Psych: Uneventful            Sign Out: Acceptable/Baseline neuro status   Airway/Respiratory: Uneventful            Sign Out: Acceptable/Baseline resp. status   CV/Hemodynamics: Uneventful            Sign Out: Acceptable CV status; No obvious hypovolemia; No obvious fluid overload   Other NRE:    DID A NON-ROUTINE EVENT OCCUR?            Last vitals:  Vitals Value Taken Time   /71 01/05/23 1245   Temp 36.2  C (97.2  F) 01/05/23 1245   Pulse 82 01/05/23 1245   Resp 16 01/05/23 1245   SpO2 98 % 01/05/23 1245       Electronically Signed By: Pearl Robles MD  January 5, 2023  1:21 PM

## 2023-01-05 NOTE — ANESTHESIA CARE TRANSFER NOTE
Patient: Marjorie Wolf    Procedure: Procedure(s):  COLONOSCOPY, WITH POLYPECTOMY       Diagnosis: Screen for colon cancer [Z12.11]  Diagnosis Additional Information: No value filed.    Anesthesia Type:   MAC     Note:    Oropharynx: oropharynx clear of all foreign objects and spontaneously breathing  Level of Consciousness: awake  Oxygen Supplementation: room air    Independent Airway: airway patency satisfactory and stable  Dentition: dentition unchanged  Vital Signs Stable: post-procedure vital signs reviewed and stable    Patient transferred to: Phase II  Comments: Uneventful transport   Report to RN  Exchanging well; color natl  Pt responds appropriately to command  IV patent  Lips/teeth/dentition as preop status  Questions answered    Handoff Report: Identifed the Patient, Identified the Reponsible Provider, Reviewed the pertinent medical history, Discussed the surgical course, Reviewed Intra-OP anesthesia mangement and issues during anesthesia, Set expectations for post-procedure period and Allowed opportunity for questions and acknowledgement of understanding      Vitals:  Vitals Value Taken Time   /65    Temp 36.1  C (96.9  F) 01/05/23 1216   Pulse 77 01/05/23 1216   Resp 16 01/05/23 1216   SpO2 99 % 01/05/23 1216       Electronically Signed By: NICHO BOURNE CRNA  January 5, 2023  12:17 PM

## 2023-01-06 LAB
PATH REPORT.COMMENTS IMP SPEC: NORMAL
PATH REPORT.COMMENTS IMP SPEC: NORMAL
PATH REPORT.FINAL DX SPEC: NORMAL
PATH REPORT.GROSS SPEC: NORMAL
PATH REPORT.MICROSCOPIC SPEC OTHER STN: NORMAL
PATH REPORT.RELEVANT HX SPEC: NORMAL
PHOTO IMAGE: NORMAL

## 2023-01-22 ENCOUNTER — E-VISIT (OUTPATIENT)
Dept: FAMILY MEDICINE | Facility: CLINIC | Age: 48
End: 2023-01-22
Payer: COMMERCIAL

## 2023-01-22 DIAGNOSIS — Z53.9 ERRONEOUS ENCOUNTER--DISREGARD: Primary | ICD-10-CM

## 2023-01-27 ENCOUNTER — OFFICE VISIT (OUTPATIENT)
Dept: FAMILY MEDICINE | Facility: CLINIC | Age: 48
End: 2023-01-27
Payer: COMMERCIAL

## 2023-01-27 VITALS
BODY MASS INDEX: 25.38 KG/M2 | RESPIRATION RATE: 16 BRPM | WEIGHT: 179.5 LBS | TEMPERATURE: 98.7 F | OXYGEN SATURATION: 95 % | SYSTOLIC BLOOD PRESSURE: 129 MMHG | HEART RATE: 77 BPM | DIASTOLIC BLOOD PRESSURE: 77 MMHG

## 2023-01-27 DIAGNOSIS — H93.A1 PULSATILE TINNITUS OF RIGHT EAR: Primary | ICD-10-CM

## 2023-01-27 PROCEDURE — 99213 OFFICE O/P EST LOW 20 MIN: CPT | Performed by: FAMILY MEDICINE

## 2023-01-27 ASSESSMENT — PAIN SCALES - GENERAL: PAINLEVEL: NO PAIN (0)

## 2023-01-27 NOTE — PROGRESS NOTES
Assessment & Plan     Pulsatile tinnitus of right ear  Right ear symptoms ongoing   Exam today - no wax or other findings  Will refer to ENT for further evaluation for possible underlying cause of the symptoms  Pt will call or RTC if symptoms worsen or do not improve.   - Adult ENT  Referral; Future                 No follow-ups on file.    DO FLORECITA Sommer [facility]    Fredrick Amador is a 47 year old, presenting for the following health issues:  Ear Problem      History of Present Illness       Reason for visit:  Heartbeat in ear affecting sleep  Symptom onset:  More than a month  Symptoms include:  Hear heartbeat in right ear  Symptom intensity:  Severe  Symptom progression:  Worsening  Had these symptoms before:  Yes  Has tried/received treatment for these symptoms:  No  What makes it worse:  Nighttime and maybe stress  What makes it better:  No    Marjorie Wolf eats 4 or more servings of fruits and vegetables daily.Marjorie Wolf consumes 0 sweetened beverage(s) daily.Marjorieshar Lozanoance exercises with enough effort to increase Marjorie Wolf's heart rate 30 to 60 minutes per day.  Marjorieshar Lozanoance exercises with enough effort to increase Marjorie Wolf's heart rate 3 or less days per week.   Marjorie Wolf is taking medications regularly.             Review of Systems   Constitutional, HEENT, cardiovascular, pulmonary, gi and gu systems are negative, except as otherwise noted.      Objective    /77 (BP Location: Right arm, Patient Position: Sitting, Cuff Size: Adult Large)   Pulse 77   Temp 98.7  F (37.1  C) (Temporal)   Resp 16   Wt 81.4 kg (179 lb 8 oz)   LMP 07/05/2020 (Exact Date)   SpO2 95%   BMI 25.38 kg/m    Body mass index is 25.38 kg/m .  Physical Exam   GENERAL: healthy, alert and no distress  HENT: ear canals and TM's normal, nose and mouth without ulcers or lesions  NECK: no adenopathy, no asymmetry, masses, or scars and  thyroid normal to palpation

## 2023-01-30 ENCOUNTER — OFFICE VISIT (OUTPATIENT)
Dept: DERMATOLOGY | Facility: CLINIC | Age: 48
End: 2023-01-30
Payer: COMMERCIAL

## 2023-01-30 DIAGNOSIS — L90.5 SCAR: Primary | ICD-10-CM

## 2023-01-30 DIAGNOSIS — Z48.89 ENCOUNTER FOR POSTOPERATIVE WOUND CHECK: ICD-10-CM

## 2023-01-30 DIAGNOSIS — N95.2 VAGINAL ATROPHY: ICD-10-CM

## 2023-01-30 PROCEDURE — 99024 POSTOP FOLLOW-UP VISIT: CPT | Mod: GC | Performed by: DERMATOLOGY

## 2023-01-30 RX ORDER — ESTRADIOL 10 UG/1
INSERT VAGINAL
Qty: 24 TABLET | Refills: 0 | Status: SHIPPED | OUTPATIENT
Start: 2023-01-30 | End: 2023-05-05

## 2023-01-30 ASSESSMENT — PAIN SCALES - GENERAL: PAINLEVEL: NO PAIN (0)

## 2023-01-30 NOTE — TELEPHONE ENCOUNTER
Prescription approved per [facility] Refill Protocol.    Lisa Alonso RN   [facility]

## 2023-01-30 NOTE — PROGRESS NOTES
Dermatologic Surgery Clinic Note    Jan 30, 2023    Dermatology Problem List:  1. Compound dysplastic nevus with mild atypia, left medial mid back, s/p shave bx 5/13/2021  2. History of NMSC  - BCC, left nasal sidewall, s/p MMS 10/26/22    Subjective: The patient is a 47 year old woman who presents today for a wound check after recent dermatologic surgery.     Patient reports uncomplicated healing at recent surgical site on L side of nose. Patient is satisfied with the cosmetic appearance and denies symptoms along scar. Patient is somewhat bothered by pink color of surrounding skin.    No other associated symptoms, modifying factors, or prior treatments, except when noted above. The patient denies any constitutional symptoms, lymphadenopathy, unintentional weight loss or decreased appetite. No other skin concerns today.    Objective:   Gen: This is a well appearing individual in no acute distress. The patient is alert and oriented x 3.  An exam of the nose was performed today and visualized the following:  - Well-healed advancement flap on L nasal sidewall with mild surrounding erythema  - The surgical site noted above is clean, dry, and intact. There is no surrounding erythema, purulence, or significant tenderness to palpation. No clinical evidence of infection noted today.    Assessment and Plan:     # BCC, left nasal sidewall, s/p MMS 10/26/22  - The patient's surgery site(s) is/are healing very well. No evidence of infection on examination today.  - The patient was told to continue with wound cares until the area(s) is/are no longer crusted.   - Discussed scar is as expected to improve but could treat with PDL in the future if desired  - The patient should follow up with dermatologic surgery PRN, as well as continue with regular skin exams in general dermatology clinic.    The patient was discussed with and evaluated by attending physician, Vishnu Evans MD.    Jaleel Marlow MD  Dermatology, PGY-5  Mohs surgery  fellow    Scribe Disclosure:  I, Roney Velasco, am serving as a scribe to document services personally performed by Vishnu Evans MD based on data collection and the provider's statements to me.      Attending Attestation  I attest that the Fellow recorded the interview and exam that I personally performed.  I have reviewed the note and edited it as necessary.    Vishnu Evans M.D.  Professor  Director of Dermatologic Surgery  Department of Dermatology  [facility]

## 2023-01-30 NOTE — LETTER
1/30/2023       RE: Marjorie Wolf  [address] [facility] [id number]     Dear Colleague,    Thank you for referring your patient, Marjorie Wolf, to the [facility] DERMATOLOG[facility]INIC [place] at [facility]. Please see a copy of my visit note below.    Dermatologic Surgery Clinic Note    Jan 30, 2023    Dermatology Problem List:  1. Compound dysplastic nevus with mild atypia, left medial mid back, s/p shave bx 5/13/2021  2. History of NMSC  - BCC, left nasal sidewall, s/p MMS 10/26/22    Subjective: The patient is a 47 year old woman who presents today for a wound check after recent dermatologic surgery.     Patient reports uncomplicated healing at recent surgical site on L side of nose. Patient is satisfied with the cosmetic appearance and denies symptoms along scar. Patient is somewhat bothered by pink color of surrounding skin.    No other associated symptoms, modifying factors, or prior treatments, except when noted above. The patient denies any constitutional symptoms, lymphadenopathy, unintentional weight loss or decreased appetite. No other skin concerns today.    Objective:   Gen: This is a well appearing individual in no acute distress. The patient is alert and oriented x 3.  An exam of the nose was performed today and visualized the following:  - Well-healed advancement flap on L nasal sidewall with mild surrounding erythema  - The surgical site noted above is clean, dry, and intact. There is no surrounding erythema, purulence, or significant tenderness to palpation. No clinical evidence of infection noted today.    Assessment and Plan:     # BCC, left nasal sidewall, s/p MMS 10/26/22  - The patient's surgery site(s) is/are healing very well. No evidence of infection on examination today.  - The patient was told to continue with wound cares until the area(s) is/are no longer crusted.   - Discussed scar is as expected to  improve but could treat with PDL in the future if desired  - The patient should follow up with dermatologic surgery PRN, as well as continue with regular skin exams in general dermatology clinic.    The patient was discussed with and evaluated by attending physician, Vishnu Evans MD.    Jaleel Marlow MD  Dermatology, PGY-5  Mohs surgery fellow    Scribe Disclosure:  I, Roney Velasco, am serving as a scribe to document services personally performed by Vishnu Evans MD based on data collection and the provider's statements to me.      Attending Attestation  I attest that the Fellow recorded the interview and exam that I personally performed.  I have reviewed the note and edited it as necessary.    Vishnu Evans M.D.  Professor  Director of Dermatologic Surgery  Department of Dermatology  [facility]

## 2023-01-30 NOTE — NURSING NOTE
"Chief Complaint   Patient presents with     Derm Problem     3 month follow up scar eval, thinks it looks a \"little wonky\", lumps and bumps     Amarilis MARX, EMT  Dermatology/Dermatology Surgery  [phone]      "

## 2023-02-06 NOTE — PROGRESS NOTES
FUTURE VISIT INFORMATION      FUTURE VISIT INFORMATION:    Date: 5/2/23    Time: 9:00am    [place] [facility] [place]ERRAL INFORMATION:    Referring provider:  Jeanette Rodrigues DO    Referring providers clinic:  [facility]    Reason for visit/diagnosis  Pulsatile tinnitus of right ear    RECORDS REQUESTED FROM:       Clinic name Comments Records Status Imaging Status   [facility] Ov/referral 1/27/23- Jeanette Rodrigues DO  E-visit 1/22/23- Jeanette Rodrigues DO EP[facility]

## 2023-03-09 ASSESSMENT — SLEEP AND FATIGUE QUESTIONNAIRES
HOW LIKELY ARE YOU TO NOD OFF OR FALL ASLEEP WHILE SITTING AND READING: WOULD NEVER DOZE
HOW LIKELY ARE YOU TO NOD OFF OR FALL ASLEEP IN A CAR, WHILE STOPPED FOR A FEW MINUTES IN TRAFFIC: WOULD NEVER DOZE
HOW LIKELY ARE YOU TO NOD OFF OR FALL ASLEEP WHILE SITTING QUIETLY AFTER LUNCH WITHOUT ALCOHOL: WOULD NEVER DOZE
HOW LIKELY ARE YOU TO NOD OFF OR FALL ASLEEP WHILE WATCHING TV: SLIGHT CHANCE OF DOZING
HOW LIKELY ARE YOU TO NOD OFF OR FALL ASLEEP WHEN YOU ARE A PASSENGER IN A CAR FOR AN HOUR WITHOUT A BREAK: WOULD NEVER DOZE
HOW LIKELY ARE YOU TO NOD OFF OR FALL ASLEEP WHILE SITTING AND TALKING TO SOMEONE: WOULD NEVER DOZE
HOW LIKELY ARE YOU TO NOD OFF OR FALL ASLEEP WHILE SITTING INACTIVE IN A PUBLIC PLACE: WOULD NEVER DOZE
HOW LIKELY ARE YOU TO NOD OFF OR FALL ASLEEP WHILE LYING DOWN TO REST IN THE AFTERNOON WHEN CIRCUMSTANCES PERMIT: SLIGHT CHANCE OF DOZING

## 2023-03-16 ENCOUNTER — VIRTUAL VISIT (OUTPATIENT)
Dept: SLEEP MEDICINE | Facility: CLINIC | Age: 48
End: 2023-03-16
Payer: COMMERCIAL

## 2023-03-16 VITALS — WEIGHT: 190 LBS | HEIGHT: 71 IN | BODY MASS INDEX: 26.6 KG/M2

## 2023-03-16 DIAGNOSIS — F51.04 CHRONIC INSOMNIA: Primary | ICD-10-CM

## 2023-03-16 PROCEDURE — 90791 PSYCH DIAGNOSTIC EVALUATION: CPT | Mod: VID | Performed by: PSYCHOLOGIST

## 2023-03-16 NOTE — PROGRESS NOTES
Video-Visit Details    Type of service:  Video Visit    Video Start Time (time video started): 8:57 AM    Video End Time (time video stopped): 9:55 AM    Originating Location (pt. Location): Home    Distant Location (provider location):  Off-site    Mode of Communication:  Video Conference via [facility]    SLEEP MEDICINE CONSULTATION  Sleep Psychology  Mar 16, 2023    Name: Marjorie Wolf MRN# [id number]   Age: 48 year old YOB: 1975     Date of Consultation: Mar 16, 2023  Consultation is requested by: Jeanette Rodrigues DO  [address] [address]  Primary care provider: Jeanette Rodrigues    Reason for Sleep Consultation     Marjorie Wolf is a 48 year old adult seen today for a behavioral sleep medicine consultation because of insomnia    Assessment and Plan     Sleep Diagnoses:         Chronic insomnia    Co-occurring Conditions:         Low back pain       Allergies       SVT        Hashimoto's Thyroiditis    Clinical Impressions:    Marjorie Wolf was seen for a sleep psychology consultation and possible behavioral sleep intervention and treatment. History and clinical presentation is consistent with chronic psychophysiologic insomnia.  This evaluation suggest relatively low pretest probability for obstructive sleep apnea.  However with a strong family history of sleep apnea, both mother and father, we may want to revisit this if we do not achieve sufficient improvement in sleep with evidence-based behavioral therapy for insomnia.  Currently patient reports that she is sleeping fairly well with a combination of 3 sleep aids including tizanidine, amitriptyline, and melatonin.  Her treatment goal is to be able to maintain and improve sleep well limiting or eliminating the need for sleep medications.    Recommendations and Counselin.  Patient is a very suitable candidate for multi if component cognitive behavioral therapy for insomnia.  We will initiate  course of treatment in June.  Estimated duration will be 5- 6 video visit.    2.  Patient will be introduced to the therapy and is prescribed an educational module and module identifying/addressing maladaptive believes worry and thoughts about sleep that may increased psychophysiologic arousal and unhelpful sleep effort.    Marjorie was provided information about the pathophysiology of insomnia, psychophysiological factors contributing to the onset and maintenance of insomnia and how co-occurring medical conditions and intrinsic sleep disorders can affect sleep.  Treatment options were discussed  as applicable to patient specific sleep concerns and symptoms. The benefits and potential early side effects of treatment including increased daytime sleepiness were discussed.     Patient was advised to consult with their prescribing provider around use of or changes to prescription sleep medication.  Patient was counseled on the importance of avoiding driving if drowsy.    Services provided are compliant with the requirements of [place] Statute SS 256B.0625 Subd. 3b and paragraph (b)     Follow-up: 3 months and Course of 4-6 session CBT-I for insomnia.     History of Present Sleep Complaint     Marjorie Wolf is a 48 year old year old adult with a relevant medical history of  Hashimoto's Thyroiditis, SVT who presents with a longstanding history of insomnia with sleep initiation and sleep maintenance insomnia. She initially took diphenhydramine but it stopped working.  She was prescribed amitriptyline and has taken this for about a decade.   In recent years menopausal symptoms affected sleep consolidation.    She reports onset in graduate school.  She reports active mind and     Insomnia  sleep diary  Time in bed:  8.9   Estimated sleep efficiency: 84%    Activities in bed: Read, use Cb    Prescribed or over the counter stimulants: None reported    Prescribed or over the counter sleep medication:  Amitriptyline, Tizanidine, melatonin    Previous sleep medications patients has tried: Benadryl    Patient does not report morning headaches, choking, gasping or witnessed apneas.  She does snore lightly.  She does not report excessive daytime sleepiness.  There is no report of sudden sleep attacks, sleep paralysis, cataplexy, hypnagogic hallucinations.  She does not report recurrent nightmares, grinding of teeth, sleepwalking or sleep talking.  She does not report any dream enactment behavior.  She does not report episodes of drowsy driving or accidents due to drowsiness.    SLEEP-WAKE SCHEDULE:    Work/School Days: Patient goes to school/work: Yes       Time to Bed:  9:30 pm      Sleep Latency: More than an hour to fall asleep      Difficulty falling asleep:  7 nights per week      Number of awakenings: 3-8 times per night due to Snorting self awake, External stimuli (bed partner, pets, noise, etc), Anxiety, Other      Trouble falling back asleep 2  times a week       Usually takes Not too long these days, maybe due to drugs to get back to sleep                Rise time: 6:30 or 7:00 on weekdays, 7:30 or 8:00 on weekends      Uses alarm? Yes    Weekends/Non-work Days/All Other Days      Usually gets into bed at 10:00       Sleep latency:  An hour or more       Rise time: 7:30 or 8:00      Uses alarm? Yes    Patient sleep estimates:      Average total sleep time:  7 hours       Patient estimate of sleep need: 8-9 hours      Preferred sleep position(s): Back, Side     Naps      Intentional naps: 0 times a week      Duration:  N/A in duration      Feels better after a nap: No      Unintentional [place] days per week      Driving accident or near-miss due to sleepiness/drowsiness? No    SLEEP DISRUPTIONS:    Breathing/Snoring      Patient snores:Yes      Other people complain about Marjorie Wolf's snoring: Yes      Patient has been told Marjorie Wolf stops breathing in Marjorie Wolf's sleep:  No      Marjorie Wolf has issues with any of the following: Stuffy nose when you wake up    Movement:      Patient gets pain, discomfort, with an urge to move: Yes      Symptoms occur when Marjorie Wolf is resting: No      Symptoms occur more at night:No      Patient has been told Marjorie Wolf kicks Marjorie Wolf's legs at night:No     Behaviors in Sleep:      Marjorie Wolf has experienced the following behaviors while sleeping: Recurring Nightmares, See or hear things that are not really there upon awakening or just falling asleep      Marjorie Wolf has experienced sudden muscle weakness during the day: No    Caffeine, Alcohol and Other Substances:      Number of caffeinated beverages(per day: 2-4 tea or 1-2 coffee (either tea or coffee, not both on same day)      Last caffeine use is usually: 11:00 am      Uses alcohol to promote sleep: No      Drinks alcohol near bedtime: No      FAMILY HISTORY OF SLEEP DISORDERS      Patient has a family member been diagnosed with a sleep disorder: Yes  Mom has sleep apnea, Dad probably has sleep apnea but undiagnosed         SCALES      EPWORTH SLEEPINESS SCALE    Likeliness of dozing or falling  asleep:    Sitting and reading: Would never doze    Watching TV: Slight chance of dozing    Sitting, inactive in a public place (e.g. a theatre or a meeting): Would never doze    As a passenger in a car for an hour without a break: Would never doze    Lying down to rest in the afternoon when circumstances permit: Slight chance of dozing    Sitting and talking to someone: Would never doze    Sitting quietly after a lunch without alcohol: Would never doze    In a car, while stopped for a few minutes in traffic: Would never doze    Total score - [place]: 2      INSOMNIA SEVERITY INDEX (EUGENIO)      The Insomnia Severity Index measures the severity of insomnia symptoms over the past two weeks.    1. Difficulty falling asleep: Severe    2. Difficulty staying  "asleep: Mild    3. Problems waking up too early: Moderate    4. How SATISFIED/DISSATISFIED are youwith your CURRENT sleep pattern? Moderately Satisfied    5. How NOTICEABLE to others do you think your sleep problem is in terms of impairing the quality of your life? A Little      6. How WORRIED/DISTRESSED are you about your sleep problem? Somewhat    7. To what extent do you consider your sleep problem to INTERFERE with your daily functioning (e.g. daytime fatigue, mood, ability to functon at work/daily chores, concentration, memory, mood, etc.) CURRENTLY?   A Little     EUGENIO Total Score: 12    Guidelines for Scoring/Interpretation:   0-7 = No clinically significant insomnia   8-14 = Subthreshold insomnia   15-21 = Clinical insomnia (moderate severity)  22-28 = Clinical insomnia (severe)      STOP BANG     1. Snoring: Do you snore loudly (louder than talking or loud enough to be heard through closed doors)?  No    2. Tired: Do you often feel tired, fatigued, or sleepy during daytime?  Yes    3. Observed: Has anyone observed you stop breathing during your sleep?  No    4. Blood pressure: Do you have or are you being treated for high blood pressure?  No    5. BMI: BMI more than 35 kg/m2?  Yes    6. Age: Age over 50 yr old?  No    7. Neck circumference: Neck circumference greater than 40 cm?  No    8. Gender: Gender male?  No    STOP-BANG Total Score: 2    DANIEL Risk  0-2 Low risk for DANIEL  3-4  Intermediate risk for DANIEL  5-8 High risk        Previous Sleep Studies     No prior sleep studies.    Vitals     Ht 1.803 m (5' 11\")   Wt 86.2 kg (190 lb)   LMP 07/05/2020 (Exact Date)   BMI 26.50 kg/m       Medical History     Allergies:    Allergies   Allergen Reactions     Erythromycin Rash     Penicillins Rash     Sulfa Drugs Rash       Medications:    Current Outpatient Medications   Medication Sig Dispense Refill     amitriptyline (ELAVIL) 25 MG tablet TAKE 3 TABLETS BY MOUTH NIGHTLY AS NEEDED FOR SLEEP 270 tablet 3     " Lactase (LACTAID PO)        levalbuterol (XOPENEX HFA) 45 MCG/ACT inhaler Inhale 2 puffs into the lungs every 4 hours as needed for shortness of breath / dyspnea or wheezing 15 g 2     meloxicam (MOBIC) 15 MG tablet TAKE 1 TABLET BY MOUTH EVERY DAY 90 tablet 0     tiZANidine (ZANAFLEX) 4 MG tablet Take 1 tablet (4 mg) by mouth nightly as needed for muscle spasms 90 tablet 3     UNKNOWN MED DOSAGE digestive advantage for lactose intolorance - 1 tab daily       VAGIFEM 10 MCG TABS vaginal tablet PLACE 1 TABLET (10 MCG) VAGINALLY TWICE A WEEK 24 tablet 0     venlafaxine (EFFEXOR XR) 75 MG 24 hr capsule Take 1 capsule (75 mg) by mouth daily 90 capsule 1     VITAMIN D, CHOLECALCIFEROL, PO Take by mouth daily          Problem List:  Patient Active Problem List    Diagnosis Date Noted     BCC (basal cell carcinoma), face 09/28/2022     Priority: Medium     Saw derm [facility]  Having mohs in 10/2022       ACP (advance care planning) 10/06/2017     Priority: Medium     Thyroid nodule 01/07/2015     Priority: Medium     Hashimoto's thyroiditis 07/09/2014     Priority: Medium     Noted with elevated antibodies  TSH is still WNL -   Took meds from 2014 - 2018 stopped  Will monitor annually and treat if becomes clinical.         Thyroid enlargement 07/09/2014     Priority: Medium     Family history of hemochromatosis 05/03/2013     Priority: Medium     Pt tested positive for one minor genetic mutation but no clinical significance       Insomnia 03/22/2011     Priority: Medium     Uses elavil nightly  Tried trazodone but made her dizzy in AM  Trouble staying asleep - not an issue falling asleep  Taking tizanidine and amitriptyline       SVT (supraventricular tachycardia) (H) 03/22/2011     Priority: Medium     Found on holter monitor corresponding to symptoms  Improving         Intermittent asthma 11/08/2010     Priority: Medium     CARDIOVASCULAR SCREENING; LDL GOAL LESS THAN 160 10/31/2010     Priority: Medium      Hypertriglyceridemia 03/19/2010     Priority: Medium     Low back pain 03/16/2009     Priority: Medium     2 steroid injections  [facility] PT [facility]         Family history of other cardiovascular diseases 01/31/2006     Priority: Medium     Father MI at age 54  Father also has hematochromatosis    Problem list name updated by automated process. Provider to review       Allergic rhinitis due to pollen 03/15/2005     Priority: Medium        Past Medical/Surgical History:  Past Medical History:   Diagnosis Date     Allergic rhinitis due to other allergen      Arthritis      BCC (basal cell carcinoma), face 9/28/2022     Insomnia     benadryl nightly (50mg), se's on trazodone     Mild intermittent asthma     uses maxair 2-3x/yr     Personal history of other diseases of circulatory system 1995    Virally induced stroke- high fever, resolved completely     SVT (supraventricular tachycardia) (H)      Thyroid disease May 2014    Hypothyroidism     Patient Active Problem List    Diagnosis Date Noted     BCC (basal cell carcinoma), face 09/28/2022     Priority: Medium     Saw derm [facility]  Having mohs in 10/2022       ACP (advance care planning) 10/06/2017     Priority: Medium     Thyroid nodule 01/07/2015     Priority: Medium     Hashimoto's thyroiditis 07/09/2014     Priority: Medium     Noted with elevated antibodies  TSH is still WNL -   Took meds from 2014 - 2018 stopped  Will monitor annually and treat if becomes clinical.         Thyroid enlargement 07/09/2014     Priority: Medium     Family history of hemochromatosis 05/03/2013     Priority: Medium     Pt tested positive for one minor genetic mutation but no clinical significance       Insomnia 03/22/2011     Priority: Medium     Uses elavil nightly  Tried trazodone but made her dizzy in AM  Trouble staying asleep - not an issue falling asleep  Taking tizanidine and amitriptyline       SVT (supraventricular tachycardia) (H) 03/22/2011      Priority: Medium     Found on holter monitor corresponding to symptoms  Improving         Intermittent asthma 11/08/2010     Priority: Medium     CARDIOVASCULAR SCREENING; LDL GOAL LESS THAN 160 10/31/2010     Priority: Medium     Hypertriglyceridemia 03/19/2010     Priority: Medium     Low back pain 03/16/2009     Priority: Medium     2 steroid injections  [facility]         Family history of other cardiovascular diseases 01/31/2006     Priority: Medium     Father MI at age 54  Father also has hematochromatosis    Problem list name updated by automated process. Provider to review       Allergic rhinitis due to pollen 03/15/2005     Priority: Medium     Patient Active Problem List   Diagnosis     Allergic rhinitis due to pollen     Family history of other cardiovascular diseases     Low back pain     Hypertriglyceridemia     CARDIOVASCULAR SCREENING; LDL GOAL LESS THAN 160     Intermittent asthma     Insomnia     SVT (supraventricular tachycardia) (H)     Family history of hemochromatosis     Hashimoto's thyroiditis     Thyroid enlargement     Thyroid nodule     ACP (advance care planning)     BCC (basal cell carcinoma), face        Mental Health History     Prior Mental Health Diagnosis:   None reported    Mental Health Treatment:   None reported    Chemical Abuse/Treatment:    None reported      Family History     Family History   Problem Relation Age of Onset     Diabetes Mother         Type 2     Arthritis Mother      Depression Mother         anxiety/panic attacks as well     Obesity Mother      Thyroid Disease Mother         dx ~35yrs     Hypertension Mother      Hyperlipidemia Mother      Skin Cancer Mother      C.A.D. Father         MI at age 54     Gastrointestinal Disease Father         Ulcer H-Pylori?     Lipids Father      Hypertension Father      Hyperlipidemia Father      Genetic Disorder Father      Arthritis Maternal Grandmother      C.A.D. Maternal Grandfather          Artherosclerosis- cause of death, unsure if previous MI     Diabetes Maternal Grandfather         Type 2     Eye Disorder Maternal Grandfather         Glaucoma     Arthritis Sister      Depression Sister      Neurologic Disorder Sister         Migraines     Lipids Sister      Cerebrovascular Disease No family hx of      Breast Cancer No family hx of         Mother had biopsy 2016, found non-malignant     Cancer - colorectal No family hx of        Social History     Social History     Socioeconomic History     Marital status:      Spouse name: Maddie Renee     Number of children: 0     Years of education: Ph.D     Highest education level: None   Occupational History     Occupation:      Employer: [employer]     Comment: Sociology   Tobacco Use     Smoking status: Never     Smokeless tobacco: Never   Substance and Sexual Activity     Alcohol use: Yes     Alcohol/week: 0.0 standard drinks     Comment: Occas.     Drug use: No     Sexual activity: Yes     Partners: Female     Birth control/protection: None     Comment: stable relationship   Other Topics Concern     Parent/sibling w/ CABG, MI or angioplasty before 65F 55M? Yes   Social History Narrative    Social Documentation:3/19/10        Balanced Diet: YES    Calcium intake: more than 2 per day    Caffeine: 1-2 cups per day    Exercise:  type of activity gym;  4 times per week    Sunscreen: Yes    Seatbelts:  Yes    Self Breast Exam:  No    Self Testicular Exam: n/a    Physical/Emotional/Sexual Abuse: No    Do you feel safe in your environment? Yes        Cholesterol screen up to date: Lab Test   3/4/09    2/2/07                  0934      0911         CHOL       194       179          HDL        36*       51           LDL        107       103          TRIG       256*      124          CHOLHDLRA* 5.5*      3.5              Eye Exam up to date: No    Dental Exam up to date: Yes    Pap smear up to date: PAP      NIL   3/4/2009    Mammogram up  to date: Does Not Apply    Dexa Scan up to date: Does Not Apply    Colonoscopy up to date: Does Not Apply    Immunizations up to date: Yes last td 2005    Glucose screen if over 40:  Yes        Kacy Mcc[facility]                            Teacher     Mental Status Examination     Marjorie presented as oriented X3 with speech and language intact.  The patient was cooperative throughout the evaluation with no signs of hallucinations, delusions, loosening of associations or other thought disturbance.  Mood was normal Affect was congruent with mood. Insight and judgement were intact.  Memory appeared intact for recent and remote elements.  There was no report of suicidal ideation, intention or plan. Attention and concentration were within normal.        Jordan Mcdonald, Torie, LP, DBSM  Diplomate, Behavioral Sleep Medicine  [facility]      Copy:   Jeanette Rodrigues DO  [address] [address]    Note: This dictation was created using voice recognition software. This document may contain an error not identified before finalizing the document. If the error changes the accuracy of the document, I would appreciate it being brought to my attention.

## 2023-03-16 NOTE — NURSING NOTE
Is the patient currently in the state of [place]? YES    Visit mode:VIDEO    If the visit is dropped, the patient can be reconnected by: VIDEO VISIT: Text to cell phone: [phone]    Will anyone else be joining the visit? NO      How would you like to obtain your AVS? MyChart    Are changes needed to the allergy or medication list? NO    Reason for visit: insomnia consult

## 2023-03-16 NOTE — PATIENT INSTRUCTIONS
CBT-I Introductory Module    Understanding Sleep and Insomnia    Most people have trouble sleeping at some point in their life.   Chronic insomnia means you have had trouble falling asleep and/or staying asleep for at least the past three months.  Despite allowing enough time for sleep, it is affecting how you feel. You are not alone.  It is estimated that 10-15% of adults experience chronic insomnia.     Cognitive Behavioral Therapy for Insomnia (CBT-I)    Consistent with the guidelines of the [employer], [facility] recommends  Cognitive Behavioral Therapy for Insomnia (CBT-I) as the first-line treatment for insomnia.  CBT-I targets factors that lead to long-term insomnia:    Behavioral Conditioning    When you lay awake in bed over many nights, your body actually becomes trained or 'conditioned' to be awake during the night.  It makes the bed associated with alertness instead of sleepiness.    Habits that weaken your body's sleep drive and circadian sleep rhythm    Changing sleep schedules, extended time in bed, using mobile devices and computers close to bedtime, and naps too late in the day can harm your sleep at night.    Emotional and Physical Arousal    Things such as worrying about sleep, stress, drinking too much caffeine, and not winding down before bed can interfere with your body's natural sleep drive.    Understanding Sleep and Insomnia    [facility] CBT-I is a four-part program that teaches you the skills needed for a better night's sleep. The first step in your program is learning a bit about sleep and insomnia.      The Basics of Sleep    How does sleep help us?    Sleep, like food and water, is something we need every day. The purpose of sleep is still not exactly clear, but sleep experts agree we need consistent quality sleep to function at our best. There is evidence that sleep helps maintain brain and body functions.  It helps maintain thinking ability  and mood.  Sleep is actually a very active part of life. Sleep occurs in four stages that cycle every  minutes throughout the night. We get our deepest sleep during the first few hours of sleep.  During the last half of our sleep, we usually get the bulk of our REM (Rapid Eye Movement) sleep.  REM sleep is when most of our dreaming occurs.    How much sleep do we need?    Sleep needs vary from person to person. Most adults need between 6-8 hours of sleep.  Sleeping too little or too much may be a health risk.  As we age, most people report their sleep gets lighter, earlier, shorter, and more restless.     What Controls Our Sleep?    The three things that regulate your sleep are your sleep drive, biological clock and your arousal system (emotional and physical).  Together these make us feel alert during the day and promote sleep at night.    Your sleep drive depends on how long you have been awake. It is lowest when you first wake up.  Sleep drive gradually increases as the day goes on.  The longer time you are awake the easier it is to fall asleep.  Sleeping gradually reduces your sleep drive. That is why napping in the evening or close to bed can make it harder to sleep at night.    Your biological clock promotes wakefulness during the day and sleep at night.          From Jaye et al. (2009). Evaluation and Management of Insomnia in the Psychiatric setting. Published Online: 19/1/2009; DOI: https://doi.org/[email or web address]/[email or web address]      Understanding Insomnia    What causes insomnia?    Some people have a greater predisposition to developing insomnia due to genetics, personality or age. A host of things can trigger or precipitate it: jet lag, working a different shift, medication, or the onset of a medical or mental health condition.         Insomnia and Your Arousal System    Mental activity, emotions and physical symptoms can make your brain too active to sleep by masking the strength of your sleep  drive. Your brain's arousal system not only triggers insomnia, it plays a role in maintaining it as well.  Common sources of arousal include:    Worry about sleep in bed  An active mind concerned about unfinished tasks  Anxiety, Stress and Depression  Pain     What perpetuates insomnia?    Short-term insomnia can become chronic when you begin to feel fearful, worried and  on guard  about sleep loss. As you spend more time in bed or try forcing yourself to sleep your bed becomes linked with wakefulness.  This is why people with insomnia commonly report feeling tired before bed but suddenly more alert when getting into bed.  This type of  conditioning  along with unhelpful sleep habits maintains the insomnia even when the triggering event has resolved.    Tracking your Sleep    Sleep tracking is an essential part of training yourself to sleep better and monitoring your progress.  There are several ways to keep track of your sleep habits.     Insomnia  Carmita    The Insomnia  carmita is a convenient way to keep track of your sleep prior to and during treatment.  Simply download the free carmita on your Apple or Android phone and record your information each morning.   The data you enter should be your recollection of the past nigh of sleep. Do not watch or monitor the clock in the middle of the night while keeping your sleep diary.     The carmita also includes training and sleep schedule recommendations.  We recommend you use only the tracking function unless instructed by your provider. You can email your data to yourself prior to your visit by using the [place] User Data function found in the Settings Section.  It is important that you have your data available to review with your provider at the time of your visit.             [employer] Sleep Diary    You can also track your sleep using the [employer] paper sleep diary.  You can upload your sleep diary and send it via a [employer] message  or have it with you  at the time of your visit.        Mobile and Wearable Sleep Tracking Devices    There are many sleep tracking devices and mobile applications that estimate the timing and quantity of sleep by measuring body movement.  Though many of these devices claim to measure the depth or stages of sleep, they cannot measure brain wave activity or other indicators required to determine the actual stages of sleep.  They are helpful in estimating the timing and total amount of time you sleep.    CBT-I and Sleeping Pills    Sleep medications can be helpful in the short-term but often stop working in the long-term.  Sleeping pills treat symptoms and not the underlying cause of insomnia.  They also can have side effects that last well into the day. Abruptly stopping sleeping pills can cause temporary rebound insomnia and lead to increased distress about sleeplessness.  This in turn strengthens the belief that pills are necessary for sleep.    Many patients choose to discontinue sleeping pills prior to beginning CBT-I.  You should talk to your prescribing provider before tapering or discontinuing sleep medication.    [facility]  Cognitive Behavioral Therapy for Insomnia   Cognitive Training Module    Developing Healthy Sleep Thoughts    Insomnia is often is triggered by stressful events such as a change in employment, a separation, medical illness, or loss.  How you handle your sleep problem, mainly your thoughts and behaviors, determines in large part whether your sleeplessness is short -term or develops into chronic insomnia well after the stressful event is over.     This part of your program involves learning a set of skills to change negative and worrisome thoughts about sleep.   Insomnia is more likely to persist if you interpret the onset as a threat or loss of control.  Worry, fears and untrue beliefs about insomnia can become a vicious cycle.  Negative sleep thoughts activate the physical and emotional systems of your  body.  This strengthens wakefulness and weakens your sleep system.  This in turn produces increased stress and pressure to sleep.  We call this unhelpful sleep effort.     Changing How You Think About Insomnia    We now know that our thoughts and attitudes affect our stress response.  Negative sleep thoughts can worsen your insomnia. They can lead to greater fear or anxiety about sleep, which in turn can aggravate your sleep problem. Thinking more positively and realistically about your sleep promotes its health and healing.     Myths about Sleep    People need 8 hours of sleep     This is untrue.  Sleep needs vary from person to person.  Most people need between 6-8 hours to feel alert during the day.  People who sleep 7 hours live longer than those who sleep 8 hours.  Research also suggests people who sleep 5 hours live longer than those who sleep 9 hours    Insomnia Causes Dementia     While there is lots of research happening looking into  various causes of dementia, there is currently no conclusive evidence that insomnia causes dementia.  We do know that the rate of Insomnia is higher in a host of health conditions that have been associated with increased risk of certain types of dementia.  In general, people with primary insomnia perform similar to       normal sleepers in tests of neurocognitive ability.      Insomnia is the same as sleep deprivation    Sleep deprivation is lack of sleep due to not allowing enough time for sleep.  This is typically not true for insomnia where people have enough time for sleep and even extend their sleep time trying to get more sleep.  Most people with insomnia get about the same amount of sleep as normal sleepers.  The difference is that with insomnia the sleep is fragmented and less consolidated.       You are Getting More Sleep than You Think    Research using objective sleep tests reveal that people with insomnia get an average of one hour more sleep than they think. This  is due in part because the brain misperceives Stage 1 and 2 sleep as being awake.  In addition, stress and arousal while awake in bed changes the brain's perception of time awake.    Examples of Unhealthy Sleep Thoughts    Negative sleep thoughts can have a profound impact on your ability to get a good night's sleep. Below are some examples of negative thoughts associated with insomnia that may sound familiar to you:    I must get 8 hours of sleep to function during the day.    I won't get to sleep tonight.    Insomnia is going to cause health problems.    I am dreading going to bed.    I woke up early again.  I know I won't get back to sleep.    The reason I feel terrible today is because of my insomnia.    I've totally lost control of my sleep.    I can't sleep without a sleeping pill.    Negative thoughts usually occur automatically and feel like a knee-jerk reaction.  They are often untrue or distorted, especially late in the evening as you become increasingly tired and others are asleep.      Changing Unhealthy Sleep Thoughts    Now that you understand the impact that negative thoughts can have on your sleep, you are ready to change these unhelpful thoughts.  The strategy is powerful and simple:  By recognizing and replacing your negative thoughts about sleep with more accurate, positive thoughts, you will be less anxious and frustrated about your sleep. A more realistic and positive attitude about sleep will allow you to relax and sleep more easily through the night.         There are several important steps involved in changing your unhelpful and negative thoughts about sleep:      Examples of Healthy Sleep Thoughts    My work will not suffer much if I have a poor night's sleep.    I'm probably getting more sleep than I think.    Other things than my sleep affect my daytime functioning.    Because I didn't sleep well last night, I am more likely to sleep well tonight because of increased sleep drive that  leads to deeper sleep.    Sleep requirements vary from one person to another.    If I don't sleep well, most of the time the worst that can happen is that my mood might not be as bright the next day.    If I awaken after about 5 hours of sleep, I have gotten the core sleep I need for the day.    I'm more likely to fall asleep the longer I've been awake    I'm more likely to fall asleep as my body temperature begins to decrease through the night.    My body's wakefulness system takes charge during the day to promote daytime functioning.    These sleep skills have worked for others, and they can work for me.    Other Recommendations for Changing Beliefs and Attitudes   About Your Sleep    Keep Realistic Expectations     There is a widespread belief that 8 hours of sleep is necessary to feel refreshed and function well during the day. Many believe that good sleep means never waking up at night.  Others come to expect that they should always wake up in the morning feeling full of energy.  Concerns may arise when your actual sleep falls short of these expectations. Try to avoid placing undue pressure on yourself to achieve certain sleep levels.  It only increases anxiety about sleeping.  Focus on quality sleep not quantity of sleep.    Revise Your Thoughts about the Causes of Insomnia    There is a natural tendency to attribute our sleep problems completely to external factors such as a chemical imbalance, pain, aging or things over which we may have little control.  Although these factors may contribute to your insomnia, research shows CBT-I is beneficial even if they are present.    Don't Blame Insomnia for All Daytime Impairments      Many individuals blame insomnia for every symptom or concern they experience during the day from fatigue to lack of concentration. Though poor sleep may produce some of these symptoms, it us usually untrue that all daytime impairment is attributable to insomnia.  It is more often that  other factors such as stress and co-occurring medical problems contribute more to how you feel during the day.      Don't Catastrophize      Catastrophic thinking means making a sleep mountain out of a molehill.  Some people believe poor sleep will have catastrophic consequences to their physical health, mental health and appearance. Others see insomnia as a complete loss of control.  These perceived consequences of insomnia often prompt people to seek medication or other treatment.  Keep in mind insomnia can be very unpleasant but for the most part is not dangerous.    Don't Focus on Sleep     Some people reduce their activity level because of poor sleep.  Although sleep is a necessary part of life, don't make it the focus of your thoughts and concern. Trust that if you engage in health sleep habits, your body will give you the sleep it needs.  Make sure you continue your normal activities despite your insomnia.    Never Try to Sleep      Of all the habits the most important one is this:  Never try to force yourself to sleep.  Doing so usually backfires and makes things worse. Instead, focus on keeping to your prescribed sleep schedule, getting up at the same time every day and using the bed only for sleep.     What are Your Three Top Negative Thoughts About Your Sleep?    Negative Thought                        Resulting Feeling                   Alternate Thought  I must get 8 hours of sleep                   Fear                      People need different amounts   night or my health will suffer                                              of sleep and sleep time varies                                                                                             Somewhat from night to  night  1._____________________________________________________________________    2._____________________________________________________________________    3._____________________________________________________________________    Portions of this module contains concepts and edited content developed by   Ezekiel Ariza PsyD, WILLY, DBSFLORECITA and used with permission.    c

## 2023-04-04 NOTE — RESULT ENCOUNTER NOTE
Dear Marjorie,   Your test results are all back -   -Normal red blood cell (hgb) levels, normal white blood cell count and normal platelet levels.  -Cholesterol levels (LDL,HDL, Triglycerides) are borderline.  ADVISE: rechecking in 1 year.   -Liver and gallbladder tests are normal (ALT,AST, Alk phos, bilirubin), kidney function is normal (Cr, GFR), sodium is normal, potassium is normal, calcium is normal, glucose is normal.  -TSH (thyroid stimulating hormone) level is normal which indicates normal thyroid function.  -Magnesium is elevated - lets have you cut back to once a day   Let us know if you have any questions.  -Jeanette Rodrigues, DO  
Render Risk Assessment In Note?: yes
Additional Notes: Discussed excision
Detail Level: Simple

## 2023-04-12 DIAGNOSIS — Z01.10 ENCOUNTER FOR HEARING TEST: Primary | ICD-10-CM

## 2023-05-02 ENCOUNTER — OFFICE VISIT (OUTPATIENT)
Dept: AUDIOLOGY | Facility: CLINIC | Age: 48
End: 2023-05-02
Payer: COMMERCIAL

## 2023-05-02 ENCOUNTER — PATIENT OUTREACH (OUTPATIENT)
Dept: OTOLARYNGOLOGY | Facility: CLINIC | Age: 48
End: 2023-05-02

## 2023-05-02 ENCOUNTER — OFFICE VISIT (OUTPATIENT)
Dept: OTOLARYNGOLOGY | Facility: CLINIC | Age: 48
End: 2023-05-02
Payer: COMMERCIAL

## 2023-05-02 VITALS
WEIGHT: 189 LBS | HEIGHT: 70 IN | TEMPERATURE: 97.3 F | HEART RATE: 87 BPM | SYSTOLIC BLOOD PRESSURE: 124 MMHG | DIASTOLIC BLOOD PRESSURE: 74 MMHG | OXYGEN SATURATION: 96 % | BODY MASS INDEX: 27.06 KG/M2

## 2023-05-02 DIAGNOSIS — D16.4 OSTEOMA OF EXTERNAL EAR CANAL: Primary | ICD-10-CM

## 2023-05-02 DIAGNOSIS — H93.11 TINNITUS OF RIGHT EAR: Primary | ICD-10-CM

## 2023-05-02 DIAGNOSIS — H61.23 EXCESSIVE CERUMEN IN BOTH EAR CANALS: ICD-10-CM

## 2023-05-02 DIAGNOSIS — H93.A1 PULSATILE TINNITUS OF RIGHT EAR: ICD-10-CM

## 2023-05-02 PROCEDURE — 92550 TYMPANOMETRY & REFLEX THRESH: CPT | Performed by: AUDIOLOGIST

## 2023-05-02 PROCEDURE — 99203 OFFICE O/P NEW LOW 30 MIN: CPT | Mod: 25 | Performed by: OTOLARYNGOLOGY

## 2023-05-02 PROCEDURE — 92557 COMPREHENSIVE HEARING TEST: CPT | Performed by: AUDIOLOGIST

## 2023-05-02 PROCEDURE — 92504 EAR MICROSCOPY EXAMINATION: CPT | Performed by: OTOLARYNGOLOGY

## 2023-05-02 ASSESSMENT — PAIN SCALES - GENERAL: PAINLEVEL: NO PAIN (0)

## 2023-05-02 NOTE — PROGRESS NOTES
Dear Jeanette Pinto:    I had the pleasure of meeting Marjorie Wolf in consultation today at the [facility] at your request.    CHIEF COMPLAINT: Right pulsatile tinnitus    HISTORY OF PRESENT ILLNESS: Patient is a 48-year-old in today for consultation on her right ear and right pulsatile tinnitus referred from her family physician.  She says she has noticed the tinnitus for couple years.  Does seem to increase with stress and is intermittent.  Does not notice it on the left side at all.  Seems to be a little bit more frequent since January.  It varies in intensity sometimes better sometimes worse.  She does not have any pain or drainage in your ears.  She feels her hearing is equal and symmetrical.  There is no dizziness or balance concerns.  Further denies any dysphagia, hoarseness, facial paresthesias.  She has 1 sister, no ear issues with her.  Father has hearing aids.    ALLERGIES:    Allergies   Allergen Reactions     Erythromycin Rash     Penicillins Rash     Sulfa Antibiotics Rash       HABITS: Social History    Substance and Sexual Activity      Alcohol use: Yes        Alcohol/week: 0.0 standard drinks of alcohol        Comment: Occas.     History   Smoking Status     Never   Smokeless Tobacco     Never         PAST MEDICAL HISTORY: Please see today's intake form (for the remainder of the PMH) which I reviewed and signed.  Past Medical History:   Diagnosis Date     Allergic rhinitis due to other allergen      Arthritis      BCC (basal cell carcinoma), face 9/28/2022     Insomnia     benadryl nightly (50mg), se's on trazodone     Mild intermittent asthma     uses maxair 2-3x/yr     Personal history of other diseases of circulatory system 1995    Virally induced stroke- high fever, resolved completely     SVT (supraventricular tachycardia) (H)      Thyroid disease May 2014    Hypothyroidism       FAMILY HISTORY/SOCIAL HISTORY:   Family History   Problem Relation Age  of Onset     Diabetes Mother         Type 2     Arthritis Mother      Depression Mother         anxiety/panic attacks as well     Obesity Mother      Thyroid Disease Mother         dx ~35yrs     Hypertension Mother      Hyperlipidemia Mother      Skin Cancer Mother      C.A.D. Father         MI at age 54     Gastrointestinal Disease Father         Ulcer H-Pylori?     Lipids Father      Hypertension Father      Hyperlipidemia Father      Genetic Disorder Father      Arthritis Maternal Grandmother      C.A.D. Maternal Grandfather         Artherosclerosis- cause of death, unsure if previous MI     Diabetes Maternal Grandfather         Type 2     Eye Disorder Maternal Grandfather         Glaucoma     Arthritis Sister      Depression Sister      Neurologic Disorder Sister         Migraines     Lipids Sister      Cerebrovascular Disease No family hx of      Breast Cancer No family hx of         Mother had biopsy 2016, found non-malignant     Cancer - colorectal No family hx of       Social History     Socioeconomic History     Marital status:      Spouse name: Maddie Renee     Number of children: 0     Years of education: Ph.D     Highest education level: Not on file   Occupational History     Occupation:      Employer: [employer]     Comment: Sociology   Tobacco Use     Smoking status: Never     Smokeless tobacco: Never   Vaping Use     Vaping status: Not on file   Substance and Sexual Activity     Alcohol use: Yes     Alcohol/week: 0.0 standard drinks of alcohol     Comment: Occas.     Drug use: No     Sexual activity: Yes     Partners: Female     Birth control/protection: None     Comment: stable relationship   Other Topics Concern     Parent/sibling w/ CABG, MI or angioplasty before 65F 55M? Yes   Social History Narrative    Social Documentation:3/19/10        Balanced Diet: YES    Calcium intake: more than 2 per day    Caffeine: 1-2 cups per day    Exercise:  type of activity gym;  4 times  per week    Sunscreen: Yes    Seatbelts:  Yes    Self Breast Exam:  No    Self Testicular Exam: n/a    Physical/Emotional/Sexual Abuse: No    Do you feel safe in your environment? Yes        Cholesterol screen up to date: Lab Test   3/4/09    2/2/07                  0934      0911         CHOL       194       179          HDL        36*       51           LDL        107       103          TRIG       256*      124          CHOLHDLRA* 5.5*      3.5              Eye Exam up to date: No    Dental Exam up to date: Yes    Pap smear up to date: PAP      NIL   3/4/2009    Mammogram up to date: Does Not Apply    Dexa Scan up to date: Does Not Apply    Colonoscopy up to date: Does Not Apply    Immunizations up to date: Yes last td 2005    Glucose screen if over 40:  Yes        Kacy Mccabe CMA                          Social Determinants of Health     Financial Resource Strain: Not on file   Food Insecurity: Not on file   Transportation Needs: Not on file   Physical Activity: Not on file   Stress: Not on file   Social Connections: Not on file   Intimate Partner Violence: Not on file   Housing Stability: Not on file       REVIEW OF SYSTEMS: Patient Supplied Answers to Review of Systems      5/2/2023     8:02 AM    ENT ROS   Constitutional Weight gain    Problems with sleep   Ears, Nose, Throat Ringing/noise in ears    Nasal congestion or drainage   Endocrine Heat or cold intolerance            The remainder of the 10 point ROS is negative    PHYSICIAL EXAMINATION:  Constitutional: The patient was well-groomed and in no acute distress.   Skin: Warm and pink.  Psychiatric: The patient's affect was calm, cooperative, and appropriate.   Respiratory: Breathing comfortably without stridor or exertion of accessory muscles.  Eyes: Pupils were equal and reactive. Extraocular movement intact.   Head: Normocephalic and atraumatic. No lesions or scars.  Ears: Patient placed under the microscope for microscopic evaluation and cleaning  of cerumen which was obscuring full visualization and complete assessment of both TMs. Under high power magnification, the right ear was examined and cleaned of cerumen using curet, alligator forceps, and suction.  After cleaning, TM is fully visualized and has normal position with normal middle ear aeration. The left ear was then cleaned and inspected using microscope, instruments and similar techniques. After cleaning of cerumen, the TM has normal position with normal aeration to middle ear.  Specifically no vascular abnormalities noted within the middle ear.  She does have medial external canal osteomas more prominent on the right side but not large or obstructing.  She does have  Nose: Sinuses were nontender. Anterior rhinoscopy revealed midline septum and absence of purulence or polyps.  Oral Cavity: Normal tongue, floor of mouth, buccal mucosa, and palate. No lesions or masses on inspection or palpation. No abnormal lymph tissue in the oropharynx.   Neck: The parotid is soft without masses. Supple with normal laryngeal and tracheal landmarks.   Lymphatic: There is no palpable lymphadenopathy or other masses in the neck.   Neurologic: Alert and oriented x 3. Cranial nerves III-XI within normal limits. Voice quality normal.  Cerebellar Function Tests:  Grossly normal    Audiogram: Audiogram performed shows normal hearing in both ears through all frequencies.  She has 96% discrimination on the right and 100% on the left.  Also has normal bilateral type A tympanograms.      IMPRESSION AND PLAN:   1. Right pulsatile tinnitus: Discussed this with her in detail.  Normal exam today and see no sign of any vascular abnormality but certainly with unilateral pulsations discussed possibility for issues such as with a glomus tumor.  Imaging studies with her normal exam today would be the next step if problematic or to monitor.  Not a significant issue with her and after discussing this with answering multiple questions she  would prefer just to monitor for now.  I discussed with her the importance of close monitoring though and to this end we will follow-up in 1 year.  She will come in sooner with any change in symptoms or increased issues.  Can monitor for several years and we will discuss imaging issues with each visit again.    2. External canal osteomas: Not symptomatic, recommend monitor as above.  3. Excessive cerumen: Cleaned today with instruments microscope as above.  Monitor.    Thank you very much for the opportunity to participate in the care of your patient.    Rick L Nissen MD

## 2023-05-02 NOTE — PROGRESS NOTES
AUDIOLOGY REPORT    SUMMARY: Audiology visit completed. See audiogram for results.      RECOMMENDATIONS: Follow-up with ENT.      Owen Miller.  Licensed Audiologist  [place] #[id number]

## 2023-05-02 NOTE — PATIENT INSTRUCTIONS
1. You were seen in the ENT Clinic today by Dr. Nissen.  If you have any questions or concerns after your appointment, please call   - Option 1: ENT Clinic: [phone]   - Option 2: Selena (Dr. Nissen's Nurse): [phone]                   Teena (Dr. Nissen's Nurse): [phone]      2.   Plan to return to clinic in 1 year with hearing test      How to Contact Us:  Send a Connexient message to your provider. Our team will respond to you via Connexient. Occasionally, we will need to call you to get further information.  For urgent matters (Monday-Friday), call the ENT Clinic: [phone] and speak with a call center team member - they will route your call appropriately.   If you'd like to speak directly with a nurse, please find our contact information below. We do our best to check voicemail frequently throughout the day, and will work to call you back within 1-2 days. For urgent matters, please use the general clinic phone numbers listed above.       Selena DYSON LPN  [facility] - Otolaryngology

## 2023-05-02 NOTE — NURSING NOTE
"Chief Complaint   Patient presents with     Consult     Pulsatile tinnitus -right ear      Blood pressure 124/74, pulse 87, temperature 97.3  F (36.3  C), height 1.778 m (5' 10\"), weight 85.7 kg (189 lb), last menstrual period 07/05/2020, SpO2 96 %, not currently breastfeeding.    Wayne Ely LPN    "

## 2023-05-02 NOTE — LETTER
5/2/2023       RE: Marjorie Wolf  [address] [facility] [address]     Dear Colleague,    Thank you for referring your patient, Marjorie Wolf, to the [facility]RO[facility] [place] at [facility]. Please see a copy of my visit note below.    Dear Jeanette Pinto:    I had the pleasure of meeting Marjorie Wolf in consultation today at the [facility] Ot[facility] at your request.    CHIEF COMPLAINT: Right pulsatile tinnitus    HISTORY OF PRESENT ILLNESS: Patient is a 48-year-old in today for consultation on her right ear and right pulsatile tinnitus referred from her family physician.  She says she has noticed the tinnitus for couple years.  Does seem to increase with stress and is intermittent.  Does not notice it on the left side at all.  Seems to be a little bit more frequent since January.  It varies in intensity sometimes better sometimes worse.  She does not have any pain or drainage in your ears.  She feels her hearing is equal and symmetrical.  There is no dizziness or balance concerns.  Further denies any dysphagia, hoarseness, facial paresthesias.  She has 1 sister, no ear issues with her.  Father has hearing aids.    ALLERGIES:    Allergies   Allergen Reactions    Erythromycin Rash    Penicillins Rash    Sulfa Antibiotics Rash       HABITS: Social History    Substance and Sexual Activity      Alcohol use: Yes        Alcohol/week: 0.0 standard drinks of alcohol        Comment: Occas.     History   Smoking Status    Never   Smokeless Tobacco    Never         PAST MEDICAL HISTORY: Please see today's intake form (for the remainder of the PMH) which I reviewed and signed.  Past Medical History:   Diagnosis Date    Allergic rhinitis due to other allergen     Arthritis     BCC (basal cell carcinoma), face 9/28/2022    Insomnia     benadryl nightly (50mg), se's on trazodone    Mild intermittent  asthma     uses maxair 2-3x/yr    Personal history of other diseases of circulatory system 1995    Virally induced stroke- high fever, resolved completely    SVT (supraventricular tachycardia) (H)     Thyroid disease May 2014    Hypothyroidism       FAMILY HISTORY/SOCIAL HISTORY:   Family History   Problem Relation Age of Onset    Diabetes Mother         Type 2    Arthritis Mother     Depression Mother         anxiety/panic attacks as well    Obesity Mother     Thyroid Disease Mother         dx ~35yrs    Hypertension Mother     Hyperlipidemia Mother     Skin Cancer Mother     C.A.D. Father         MI at age 54    Gastrointestinal Disease Father         Ulcer H-Pylori?    Lipids Father     Hypertension Father     Hyperlipidemia Father     Genetic Disorder Father     Arthritis Maternal Grandmother     C.A.D. Maternal Grandfather         Artherosclerosis- cause of death, unsure if previous MI    Diabetes Maternal Grandfather         Type 2    Eye Disorder Maternal Grandfather         Glaucoma    Arthritis Sister     Depression Sister     Neurologic Disorder Sister         Migraines    Lipids Sister     Cerebrovascular Disease No family hx of     Breast Cancer No family hx of         Mother had biopsy 2016, found non-malignant    Cancer - colorectal No family hx of       Social History     Socioeconomic History    Marital status:      Spouse name: Maddie Renee    Number of children: 0    Years of education: Ph.D    Highest education level: Not on file   Occupational History    Occupation:      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    Drug use: No    Sexual activity: Yes     Partners: Female     Birth control/protection: None     Comment: stable relationship    Other Topics Concern    Parent/sibling w/ CABG, MI or angioplasty before 65F 55M? Yes   Social History Narrative    Social Documentation:3/19/10        Balanced Diet: YES    Calcium intake: more than 2 per day    Caffeine: 1-2 cups per day    Exercise:  type of activity gym;  4 times per week    Sunscreen: Yes    Seatbelts:  Yes    Self Breast Exam:  No    Self Testicular Exam: n/a    Physical/Emotional/Sexual Abuse: No    Do you feel safe in your environment? Yes        Cholesterol screen up to date: Lab Test   3/4/09    2/2/07                  0934      0911         CHOL       194       179          HDL        36*       51           LDL        107       103          TRIG       256*      124          CHOLHDLRA* 5.5*      3.5              Eye Exam up to date: No    Dental Exam up to date: Yes    Pap smear up to date: PAP      NIL   3/4/2009    Mammogram up to date: Does Not Apply    Dexa Scan up to date: Does Not Apply    Colonoscopy up to date: Does Not Apply    Immunizations up to date: Yes last td 2005    Glucose screen if over 40:  Yes        Kacy Mccabe [facility]                          Social Determinants of Health     Financial Resource Strain: Not on file   Food Insecurity: Not on file   Transportation Needs: Not on file   Physical Activity: Not on file   Stress: Not on file   Social Connections: Not on file   Intimate Partner Violence: Not on file   Housing Stability: Not on file       REVIEW OF SYSTEMS: Patient Supplied Answers to Review of Systems      5/2/2023     8:02 AM    ENT ROS   Constitutional Weight gain    Problems with sleep   Ears, Nose, Throat Ringing/noise in ears    Nasal congestion or drainage   Endocrine Heat or cold intolerance            The remainder of the 10 point ROS is negative    PHYSICIAL EXAMINATION:  Constitutional: The patient was well-groomed and in no acute distress.   Skin: Warm and pink.  Psychiatric: The patient's affect was calm, cooperative, and appropriate.    Respiratory: Breathing comfortably without stridor or exertion of accessory muscles.  Eyes: Pupils were equal and reactive. Extraocular movement intact.   Head: Normocephalic and atraumatic. No lesions or scars.  Ears: Patient placed under the microscope for microscopic evaluation and cleaning of cerumen which was obscuring full visualization and complete assessment of both TMs. Under high power magnification, the right ear was examined and cleaned of cerumen using curet, alligator forceps, and suction.  After cleaning, TM is fully visualized and has normal position with normal middle ear aeration. The left ear was then cleaned and inspected using microscope, instruments and similar techniques. After cleaning of cerumen, the TM has normal position with normal aeration to middle ear.  Specifically no vascular abnormalities noted within the middle ear.  She does have medial external canal osteomas more prominent on the right side but not large or obstructing.  She does have  Nose: Sinuses were nontender. Anterior rhinoscopy revealed midline septum and absence of purulence or polyps.  Oral Cavity: Normal tongue, floor of mouth, buccal mucosa, and palate. No lesions or masses on inspection or palpation. No abnormal lymph tissue in the oropharynx.   Neck: The parotid is soft without masses. Supple with normal laryngeal and tracheal landmarks.   Lymphatic: There is no palpable lymphadenopathy or other masses in the neck.   Neurologic: Alert and oriented x 3. Cranial nerves III-XI within normal limits. Voice quality normal.  Cerebellar Function Tests:  Grossly normal    Audiogram: Audiogram performed shows normal hearing in both ears through all frequencies.  She has 96% discrimination on the right and 100% on the left.  Also has normal bilateral type A tympanograms.      IMPRESSION AND PLAN:   Right pulsatile tinnitus: Discussed this with her in detail.  Normal exam today and see no sign of any vascular abnormality but  certainly with unilateral pulsations discussed possibility for issues such as with a glomus tumor.  Imaging studies with her normal exam today would be the next step if problematic or to monitor.  Not a significant issue with her and after discussing this with answering multiple questions she would prefer just to monitor for now.  I discussed with her the importance of close monitoring though and to this end we will follow-up in 1 year.  She will come in sooner with any change in symptoms or increased issues.  Can monitor for several years and we will discuss imaging issues with each visit again.    External canal osteomas: Not symptomatic, recommend monitor as above.  Excessive cerumen: Cleaned today with instruments microscope as above.  Monitor.    Thank you very much for the opportunity to participate in the care of your patient.    Rick L Nissen MD

## 2023-06-06 ASSESSMENT — SLEEP AND FATIGUE QUESTIONNAIRES
HOW LIKELY ARE YOU TO NOD OFF OR FALL ASLEEP WHILE SITTING INACTIVE IN A PUBLIC PLACE: WOULD NEVER DOZE
HOW LIKELY ARE YOU TO NOD OFF OR FALL ASLEEP IN A CAR, WHILE STOPPED FOR A FEW MINUTES IN TRAFFIC: WOULD NEVER DOZE
HOW LIKELY ARE YOU TO NOD OFF OR FALL ASLEEP WHILE SITTING AND READING: SLIGHT CHANCE OF DOZING
HOW LIKELY ARE YOU TO NOD OFF OR FALL ASLEEP WHEN YOU ARE A PASSENGER IN A CAR FOR AN HOUR WITHOUT A BREAK: SLIGHT CHANCE OF DOZING
HOW LIKELY ARE YOU TO NOD OFF OR FALL ASLEEP WHILE WATCHING TV: SLIGHT CHANCE OF DOZING
HOW LIKELY ARE YOU TO NOD OFF OR FALL ASLEEP WHILE LYING DOWN TO REST IN THE AFTERNOON WHEN CIRCUMSTANCES PERMIT: MODERATE CHANCE OF DOZING
HOW LIKELY ARE YOU TO NOD OFF OR FALL ASLEEP WHILE SITTING QUIETLY AFTER LUNCH WITHOUT ALCOHOL: SLIGHT CHANCE OF DOZING
HOW LIKELY ARE YOU TO NOD OFF OR FALL ASLEEP WHILE SITTING AND TALKING TO SOMEONE: WOULD NEVER DOZE

## 2023-06-08 ASSESSMENT — SLEEP AND FATIGUE QUESTIONNAIRES
HOW LIKELY ARE YOU TO NOD OFF OR FALL ASLEEP WHILE SITTING INACTIVE IN A PUBLIC PLACE: WOULD NEVER DOZE
HOW LIKELY ARE YOU TO NOD OFF OR FALL ASLEEP WHILE WATCHING TV: SLIGHT CHANCE OF DOZING
HOW LIKELY ARE YOU TO NOD OFF OR FALL ASLEEP WHILE SITTING AND READING: SLIGHT CHANCE OF DOZING
HOW LIKELY ARE YOU TO NOD OFF OR FALL ASLEEP WHILE LYING DOWN TO REST IN THE AFTERNOON WHEN CIRCUMSTANCES PERMIT: MODERATE CHANCE OF DOZING
HOW LIKELY ARE YOU TO NOD OFF OR FALL ASLEEP IN A CAR, WHILE STOPPED FOR A FEW MINUTES IN TRAFFIC: WOULD NEVER DOZE
HOW LIKELY ARE YOU TO NOD OFF OR FALL ASLEEP WHEN YOU ARE A PASSENGER IN A CAR FOR AN HOUR WITHOUT A BREAK: SLIGHT CHANCE OF DOZING
HOW LIKELY ARE YOU TO NOD OFF OR FALL ASLEEP WHILE SITTING AND TALKING TO SOMEONE: WOULD NEVER DOZE
HOW LIKELY ARE YOU TO NOD OFF OR FALL ASLEEP WHILE SITTING QUIETLY AFTER LUNCH WITHOUT ALCOHOL: SLIGHT CHANCE OF DOZING

## 2023-06-13 ENCOUNTER — VIRTUAL VISIT (OUTPATIENT)
Dept: SLEEP MEDICINE | Facility: CLINIC | Age: 48
End: 2023-06-13
Payer: COMMERCIAL

## 2023-06-13 VITALS — HEIGHT: 71 IN | BODY MASS INDEX: 26.6 KG/M2 | WEIGHT: 190 LBS

## 2023-06-13 DIAGNOSIS — F51.04 CHRONIC INSOMNIA: Primary | ICD-10-CM

## 2023-06-13 PROCEDURE — 90832 PSYTX W PT 30 MINUTES: CPT | Mod: VID | Performed by: PSYCHOLOGIST

## 2023-06-13 ASSESSMENT — PAIN SCALES - GENERAL: PAINLEVEL: NO PAIN (0)

## 2023-06-13 NOTE — NURSING NOTE
Is the patient currently in the state of [place]? YES    Visit mode:VIDEO    If the visit is dropped, the patient can be reconnected by: VIDEO VISIT: Send to e-mail at: [email or web address]    Will anyone else be joining the visit? NO      How would you like to obtain your AVS? MyChart    Are changes needed to the allergy or medication list? NO    Reason for visit: ROB Lyons

## 2023-06-13 NOTE — PROGRESS NOTES
Virtual Visit Details    Type of service:  Video Visit     Originating Location (pt. Location): [facility]  Distant Location (provider location):  Off-site  Platform used for Video Visit: AmWell     Visit Start Time: 3:18 PM  Visit End Time:3:54 PM    SLEEP MEDICINE VIRTUAL VIDEO FOLLOW-UP VISIT  Sleep Psychology    Patient Name: Marjorie Wolf  MRN:  [id number]  Date of Service: Jun 13, 2023       Subjective Report     Marjorie Wolf  returns for a telehealth video visit to discuss progress in implementing behavioral strategies for the management of insomnia.  Patient consent for initiation of video visit was obtained and documented prior to initiation of visit.     Marjorie reports she did well while traveling to [place].    She did keep track of her sleep using the Insomnia  Application.  She feels she continues to have early morning awakening.  She continues to have recurrent hotflashes.         .     Sleep Data:     Source of Sleep Estimates:  Insomnia  [facility]    Average Time in Bed: 8.3 hrs.  Average Total Sleep Time:  7.2 hrs  Sleep Efficiency estimate: 88%    EPWORTH SLEEPINESS SCALE    Sitting and reading 1   Watching TV 1   Sitting, inactive in a public place (theatre or mtg.) 0    As a passenger in a car 1   Lying down to rest in the afternoon when circumstance permit 2   Sitting and talking to someone 0   Sitting quietly after lunch without alcohol 1   In a car, while stopped for a few minutes in traffic 0   TOTAL SCORE (nl <11) 6     INSOMNIA SEVERITY INDEX     Difficulty falling asleep 1   Difficult staying asleep 3   Problems waking up to early 3   How SATISFIED/DISSATISFIED are you with your CURRENT sleep pattern? 3   How NOTICEABLE to others do you think your sleep pattern is in terms of your quality of life? 1   How WORRIED/DISTRESSED are you about your current sleep pattern? 2   To what extent do you consider your sleep problem to INTERFERE with your daily fuctioning(e.g. daytime fatigue,  "mood, ability to function at work/daily chores, concentration, mood,etc.) CURRENTLY? 1   INSOMNIA SEVERITY INDEX TOTAL SCORE 14    Absence of insomnia (0-7); sub-threshold insomnia (8-14); moderate insomnia (15-21); and severe insomnia (22-28)       Interventions     Strategies and recommendations including Stimulus control therapy, Sleep compression therapy and Sleep hygiene training were reviewed and discussed today.     Services provided are compliant with the requirements of [place] Statute SS 256B.0625 Subd. 3b and paragraph (b)       Vital Signs     Ht 1.803 m (5' 11\")   Wt 86.2 kg (190 lb)   LMP 07/05/2020 (Exact Date)   BMI 26.50 kg/m       Mental Status     Orientation:  X3  Mood:  normal  Affect:  Congruent with mood  Speech/Language:  Normal  Thought Process: Intact  Associations:  Normal  Thought Content: Normal  Patient does not report any suicidal ideation, intention or plan.    Diagnostic Impressions and Plan     Chronic insomnia    Patient has read the introductory module and the cognitive restructuring module.  She feels ready to initiate core behavioral sleep training including sleep compression therapy with stimulus control therapy.    Plan:  Reduce time in bed by 1hours to 8 hours in bed.  Sleep schedule of 11 PM-7 AM with alarm.    Follow-up: 1 week      Jordan Mcdonald PsyD, PAUL, [facility]  Diplomate, Behavioral Sleep Medicine  [facility]      Note: This dictation was created using voice recognition software. This document may contain an error not identified before finalizing the document. If the error changes the accuracy of the document, I would appreciate it being brought to my attention.                             "

## 2023-06-13 NOTE — PATIENT INSTRUCTIONS
Initiate compressing sleep schedule from 9 hours to 8 hours in bed.  Recommended sleep schedule of bedtime no earlier than 11 PM and a wake time with alarm of 7 AM.  2 hours before assigned bedtime, make sure that ambient lighting is low and that you avoid use of screens, devices.  TV is okay and low lighting, at a distance of 8-10 feet, and with energy/brightness settings lowered.  Content should be pleasant, familiar and relaxing.  Do not recommend action, political, or suspense television.  We will meet in 1 week and see how things are going.  In this process, we may compress your sleep schedule to as little as 7 hours.  Avoid daytime napping, a temporary side effect initially may be an increase in daytime sleepiness.  However conversely in some individuals find that with better consolidated sleep initially they feel better during the day.  Continue to keep your bedroom cool as possible.  Keep track of your sleep by using the insomnia  application and avoid noticing her watching the clock in the middle of the night.

## 2023-07-28 DIAGNOSIS — N95.2 VAGINAL ATROPHY: ICD-10-CM

## 2023-07-28 RX ORDER — ESTRADIOL 10 UG/1
INSERT VAGINAL
Qty: 24 TABLET | Refills: 0 | Status: SHIPPED | OUTPATIENT
Start: 2023-07-28 | End: 2023-09-29

## 2023-07-28 NOTE — TELEPHONE ENCOUNTER
"Requested Prescriptions   Pending Prescriptions Disp Refills    VAGIFEM 10 MCG TABS vaginal tablet [Pharmacy Med Name: VAGIFEM 10 MCG VAGINAL TAB] 24 tablet 0     Sig: PLACE 1 TABLET (10 MCG) VAGINALLY TWICE A WEEK       Hormone Replacement Therapy Passed - 7/28/2023 12:38 AM        Passed - Blood pressure under 140/90 in past 12 months     BP Readings from Last 3 Encounters:   05/02/23 124/74   01/27/23 129/77   01/05/23 116/71                 Passed - Recent (12 mo) or future (30 days) visit within the authorizing provider's specialty     Patient has had an office visit with the authorizing provider or a provider within the authorizing providers department within the previous 12 mos or has a future within next 30 days. See \"Patient Info\" tab in inbasket, or \"Choose Columns\" in Meds & Orders section of the refill encounter.              Passed - Medication is active on med list        Passed - Patient is 18 years of age or older        Passed - No active pregnancy on record        Passed - No positive pregnancy test on record in past 12 months            Prescription approved per [facility] Refill Protocol.     Pt has a appointment on 09/29/23    Dania Snyder RN  [place]   "

## 2023-08-10 ENCOUNTER — PATIENT OUTREACH (OUTPATIENT)
Dept: CARE COORDINATION | Facility: CLINIC | Age: 48
End: 2023-08-10
Payer: COMMERCIAL

## 2023-08-28 ENCOUNTER — OFFICE VISIT (OUTPATIENT)
Dept: DERMATOLOGY | Facility: CLINIC | Age: 48
End: 2023-08-28
Payer: COMMERCIAL

## 2023-08-28 DIAGNOSIS — D22.9 MULTIPLE PIGMENTED NEVI: ICD-10-CM

## 2023-08-28 DIAGNOSIS — Z12.83 SKIN CANCER SCREENING: Primary | ICD-10-CM

## 2023-08-28 DIAGNOSIS — L82.1 SEBORRHEIC KERATOSES: ICD-10-CM

## 2023-08-28 DIAGNOSIS — D18.01 CHERRY ANGIOMA: ICD-10-CM

## 2023-08-28 DIAGNOSIS — L81.4 SOLAR LENTIGINOSIS: ICD-10-CM

## 2023-08-28 DIAGNOSIS — B07.0 PLANTAR WART OF RIGHT FOOT: ICD-10-CM

## 2023-08-28 PROCEDURE — 99213 OFFICE O/P EST LOW 20 MIN: CPT | Performed by: PHYSICIAN ASSISTANT

## 2023-08-28 ASSESSMENT — PAIN SCALES - GENERAL: PAINLEVEL: NO PAIN (0)

## 2023-08-28 NOTE — PROGRESS NOTES
[facility] Dermatology Note  Encounter Date: Aug 28, 2023  Office Visit     Dermatology Problem List:  1. Compound dysplastic nevus with mild atypia, left medial mid back, s/p shave bx 5/13/2021  # BCC, left nasal sidewall, [facility] 10/26/22   ____________________________________________    Assessment & Plan:    # HX of BCC,  left nasal sidewall, [facility] 10/26/22  - No signs of recurrence.     #Right plantar warts,  Discussed underlying viral etiology of warts and treatment strategies that range from destructive to immune therapies. Treatment options including salicylic acid, imiquomod, Efudex, cimetidine, cryotherapy, cantheradone applications, candida injections, squaric acid treatment.   -pt would like to start over-the-counter treatments.  OTC plan and handout was provided for the patient including salicylic acid, duct tape and nightly soaking with scrapings.    # Cherry angiomas  # Seborrheic keratoses (all lesions of concern)  - Reassured of benign nature, no treatment necessary    # Multiple benign nevi  # Solar lentigines  - No concerning lesions today  - Monitor for ABCDEs of melanoma   - Continue sun protection - recommend SPF 30 or higher with frequent application   - Return sooner if noticing changing or symptomatic lesions     # Hx dysplastic nevus, no evidence of recurrent disease  - Continue regular skin exams    Procedures Performed:   None    Follow-up: 1 year(s) in-person, or earlier for new or changing lesions    Staff :  All risks, benefits and alternatives were discussed with patient.  Patient is in agreement and understands the assessment and plan.  All questions were answered.  Sun Screen Education was given.   Return to Clinic annually or sooner as needed.   Heather Ricardo PA-C   [facility]   ____________________________________________    CC: Skin Check (FBSC - patient has a spot sof concern on the back of R arm, on the L side of the neck and  L  shoulder)    HPI:    Marjorie Wolf is a(n) 48 year old adult who presents today as a return patient for FBSE. Last seen my myself on 8/22/22 when she had a biopsy on her left nasal sidewall.  This returned as a basal cell carcinoma for which she had Mohs surgery with Dr. Sanchez on 10/26/2022.  She reports this healed well.    She wonders about a few growths on her skin.  None of  them are painful, bleeding, or growing rapidly.     Family history (mother) of multiple NMSC. History of 1 blistering sunburn at 19 years old. She uses sunscreen regularly.    Patient is otherwise feeling well, without additional skin concerns.    Labs Reviewed:  N/A    Physical Exam:  Vitals: LMP 07/05/2020 (Exact Date)   SKIN: Total skin excluding the undergarment areas was performed. The exam included the head/face, neck, both arms, chest, back, abdomen, both legs, digits and/or nails.   -Circular scar on the on the left nasal sidewall.  - There are dome shaped bright red papules on the trunk and extremities.   - Multiple regular brown pigmented macules and papules are identified on the trunk and extremities.   - Scattered brown macules on sun exposed areas.  - There are waxy stuck on tan to brown papules on the trunk and extremities.  -There are verrucous papules on the right metatarsal surface  - No other lesions of concern on areas examined.             Medications:  Current Outpatient Medications   Medication    amitriptyline (ELAVIL) 25 MG tablet    Lactase (LACTAID PO)    levalbuterol (XOPENEX HFA) 45 MCG/ACT inhaler    meloxicam (MOBIC) 15 MG tablet    tiZANidine (ZANAFLEX) 4 MG tablet    VAGIFEM 10 MCG TABS vaginal tablet    venlafaxine (EFFEXOR XR) 75 MG 24 hr capsule    VITAMIN D, CHOLECALCIFEROL, PO    UNKNOWN MED DOSAGE     No current facility-administered medications for this visit.      Past Medical History:   Patient Active Problem List   Diagnosis    Allergic rhinitis due to pollen    Family history of other  cardiovascular diseases    Low back pain    Hypertriglyceridemia    CARDIOVASCULAR SCREENING; LDL GOAL LESS THAN 160    Intermittent asthma    Insomnia    SVT (supraventricular tachycardia) (H)    Family history of hemochromatosis    Hashimoto's thyroiditis    Thyroid enlargement    Thyroid nodule    ACP (advance care planning)    BCC (basal cell carcinoma), face     Past Medical History:   Diagnosis Date    Allergic rhinitis due to other allergen     Arthritis     BCC (basal cell carcinoma), face 9/28/2022    Insomnia     benadryl nightly (50mg), se's on trazodone    Mild intermittent asthma     uses maxair 2-3x/yr    Personal history of other diseases of circulatory system 1995    Virally induced stroke- high fever, resolved completely    SVT (supraventricular tachycardia) (H)     Thyroid disease May 2014    Hypothyroidism        CC Jeanette Rodrigues,   [address] [address] on close of this encounter.

## 2023-08-28 NOTE — NURSING NOTE
Dermatology Rooming Note    Marjorie Wolf's goals for this visit include:   Chief Complaint   Patient presents with    Skin Check     FBSC - patient has a spot sof concern on the back of R arm, on the L side of the neck and  L shoulder       Juliet Jerry

## 2023-08-28 NOTE — LETTER
8/28/2023       RE: Marjorie Wolf  [address] [facility] [address]     Dear Colleague,    Thank you for referring your patient, Marjorie Wolf, to the [facility] [place] at [facility]. Please see a copy of my visit note below.    [facility] Dermatology Note  Encounter Date: Aug 28, 2023  Office Visit     Dermatology Problem List:  1. Compound dysplastic nevus with mild atypia, left medial mid back, s/p shave bx 5/13/2021  # BCC, left nasal sidewall, [facility] 10/26/22   ____________________________________________    Assessment & Plan:    # HX of BCC,  left nasal sidewall, [facility] 10/26/22  - No signs of recurrence.     #Right plantar warts,  Discussed underlying viral etiology of warts and treatment strategies that range from destructive to immune therapies. Treatment options including salicylic acid, imiquomod, Efudex, cimetidine, cryotherapy, cantheradone applications, candida injections, squaric acid treatment.   -pt would like to start over-the-counter treatments.  OTC plan and handout was provided for the patient including salicylic acid, duct tape and nightly soaking with scrapings.    # Cherry angiomas  # Seborrheic keratoses (all lesions of concern)  - Reassured of benign nature, no treatment necessary    # Multiple benign nevi  # Solar lentigines  - No concerning lesions today  - Monitor for ABCDEs of melanoma   - Continue sun protection - recommend SPF 30 or higher with frequent application   - Return sooner if noticing changing or symptomatic lesions     # Hx dysplastic nevus, no evidence of recurrent disease  - Continue regular skin exams    Procedures Performed:   None    Follow-up: 1 year(s) in-person, or earlier for new or changing lesions    Staff :  All risks, benefits and alternatives were discussed with patient.  Patient is in agreement and understands the assessment and plan.  All questions  were answered.  Sun Screen Education was given.   Return to Clinic annually or sooner as needed.   Heather Ricardo PA-C   [facility]   ____________________________________________    CC: Skin Check (FBSC - patient has a spot sof concern on the back of R arm, on the L side of the neck and  L shoulder)    HPI:    Marjorie Wolf is a(n) 48 year old adult who presents today as a return patient for FBSE. Last seen my myself on 8/22/22 when she had a biopsy on her left nasal sidewall.  This returned as a basal cell carcinoma for which she had Mohs surgery with Dr. Sanchez on 10/26/2022.  She reports this healed well.    She wonders about a few growths on her skin.  None of  them are painful, bleeding, or growing rapidly.     Family history (mother) of multiple NMSC. History of 1 blistering sunburn at 19 years old. She uses sunscreen regularly.    Patient is otherwise feeling well, without additional skin concerns.    Labs Reviewed:  N/A    Physical Exam:  Vitals: LMP 07/05/2020 (Exact Date)   SKIN: Total skin excluding the undergarment areas was performed. The exam included the head/face, neck, both arms, chest, back, abdomen, both legs, digits and/or nails.   -Circular scar on the on the left nasal sidewall.  - There are dome shaped bright red papules on the trunk and extremities.   - Multiple regular brown pigmented macules and papules are identified on the trunk and extremities.   - Scattered brown macules on sun exposed areas.  - There are waxy stuck on tan to brown papules on the trunk and extremities.  -There are verrucous papules on the right metatarsal surface  - No other lesions of concern on areas examined.             Medications:  Current Outpatient Medications   Medication    amitriptyline (ELAVIL) 25 MG tablet    Lactase (LACTAID PO)    levalbuterol (XOPENEX HFA) 45 MCG/ACT inhaler    meloxicam (MOBIC) 15 MG tablet    tiZANidine (ZANAFLEX) 4 MG tablet    VAGIFEM 10 MCG  TABS vaginal tablet    venlafaxine (EFFEXOR XR) 75 MG 24 hr capsule    VITAMIN D, CHOLECALCIFEROL, PO    UNKNOWN MED DOSAGE     No current facility-administered medications for this visit.      Past Medical History:   Patient Active Problem List   Diagnosis    Allergic rhinitis due to pollen    Family history of other cardiovascular diseases    Low back pain    Hypertriglyceridemia    CARDIOVASCULAR SCREENING; LDL GOAL LESS THAN 160    Intermittent asthma    Insomnia    SVT (supraventricular tachycardia) (H)    Family history of hemochromatosis    Hashimoto's thyroiditis    Thyroid enlargement    Thyroid nodule    ACP (advance care planning)    BCC (basal cell carcinoma), face     Past Medical History:   Diagnosis Date    Allergic rhinitis due to other allergen     Arthritis     BCC (basal cell carcinoma), face 9/28/2022    Insomnia     benadryl nightly (50mg), se's on trazodone    Mild intermittent asthma     uses maxair 2-3x/yr    Personal history of other diseases of circulatory system 1995    Virally induced stroke- high fever, resolved completely    SVT (supraventricular tachycardia) (H)     Thyroid disease May 2014    Hypothyroidism        CC Jeanette Rodrigues,   [address] [address] on close of this encounter.

## 2023-08-28 NOTE — PATIENT INSTRUCTIONS
"Seborrheic Keratoses Overview    Seborrheic keratoses are common benign growths of unknown cause seen in adults   due to a thickening of an area of the top skin layer.    Who's At Risk  Although they can occur anytime after puberty, almost everyone over 50 has one or more of these and they increase in number with age. Some families have an inherited tendency to grow multiple lesions. Men and women are equally as likely to develop seborrheic keratoses. Dark-skinned people are less affected than those with light skin; a variant seen in blacks is called dermatosis papulosa nigra.    Signs & Symptoms  One or more spots can occur anywhere on the body, except for palms, soles, and mucous membranes (eg, in the mouth or rectum). They do not go away. They do not turn into cancers, but some cancers resemble seborrheic keratosis.    They start as light brown to skin-colored, flat areas, which are round to oval and of varying size (usually less than a half inch, but sometimes much larger). As they grow thicker and rise above the skin surface, seborrheic keratoses may become dark brown to almost black with a \"stuck on\" appearance. The surface may feel smooth or rough.    Self-Care Guidelines  No treatment is needed unless there is irritation from clothing with itching or bleeding.  There is no way to prevent new spots from forming.  Some lotions with alpha hydroxyl acids may make the areas feel smoother with regular use but will not eliminate them.  OTC freezing techniques are available but usually not effective.    When to Seek Medical Care  If a spot on the skin is growing, bleeding, painful, or itchy, see your doctor.    Spots can be removed if you don't want them, but removal is considered a cosmetic issue and is usually not covered by insurance.    Treatments Your Physician May Prescribe  Removal can be accomplished with freezing (cryosurgery), scraping (curettage), burning (electrocautery), lasers, or with acids. The " doctor might conduct a biopsy if the growth looks unusual.    References:  Andrea Leyva, ed. Dermatology, pp.4290-0610. [place]: [employer], 2003.    Anselmo Saleh, ed. Gabi's Dermatology in General Medicine. 6th ed, pp.767-770. [place]: [employer], 2003.       Pediatric Dermatology   [address] [address]  [phone]    Over The Counter at Home Wart Instructions:    Please follow instructions closely and do not skip days of treatment.  Soak warts for 10 minutes in warm water (this can be while bathing or showering).   Pat area dry with a towel.   Gently remove any whitish dead skin from the surface of the warts. Stop if it becomes painful or starts to bleed.   Nail files or pumice stones can be used, but should not be reused on normal skin and should not be used with others.   Apply Dr. Benny chen, Compound W, DuoFilm, Wart-off or other 17% salicylic acid-containing product to cover each wart.  Do not apply to normal surrounding skin.  Cover warts with duct tape. Most patients choose to apply this at bedtime and leave overnight.   Repeat the steps daily if possible.     What is NORMAL?   When the tape is removed, it may pull off dead layers of skin from the wart and surrounding normal skin.   A  whitish  color to the wart and surrounding normal skin is to be expected.    Stop treatment if skin becomes too irritated.   You should continue treatment until the warts are no longer present.

## 2023-08-30 ENCOUNTER — TELEPHONE (OUTPATIENT)
Dept: DERMATOLOGY | Facility: CLINIC | Age: 48
End: 2023-08-30
Payer: COMMERCIAL

## 2023-08-30 NOTE — TELEPHONE ENCOUNTER
Lvm. 1st attempt informing patient to call [phone] to schedule a 1 year follow up with Heather Ricardo in Dermatology.

## 2023-09-07 ENCOUNTER — PATIENT OUTREACH (OUTPATIENT)
Dept: CARE COORDINATION | Facility: CLINIC | Age: 48
End: 2023-09-07
Payer: COMMERCIAL

## 2023-09-10 ENCOUNTER — NURSE TRIAGE (OUTPATIENT)
Dept: NURSING | Facility: CLINIC | Age: 48
End: 2023-09-10
Payer: COMMERCIAL

## 2023-09-10 ENCOUNTER — MYC MEDICAL ADVICE (OUTPATIENT)
Dept: FAMILY MEDICINE | Facility: CLINIC | Age: 48
End: 2023-09-10
Payer: COMMERCIAL

## 2023-09-10 NOTE — TELEPHONE ENCOUNTER
S:  Positive for covid.    B: Writer was given permission to have spouse be part of there conservation.    Symptoms started on  9/5         Covid test done on 9/10 at home.           Current symptoms are:  Cough  Shortness of breath only one day on 9/7  Loss of taste  Loss of smell  Generalized body aches  Headache  Sore throat  Runny nose/ stuff nose  (prone to sinus infection)  Fatigue   O2 saturation  97%    Is going to decline taking Paxlovid.  Has had covid vaccinations.    [facility] given Doxycycline fot right ear and sinus infection.     A: Protocol and care advice reviewed. Patient verbalized understanding, in agreement with plan, and voiced no further questions. Call back with any new or worsening symptoms, concerns, or questions.    Mindy Pope RN, [facility] Triage Nurse Advisor      A: per protocol can  care for self at home.    R:Protocol and care advice reviewed. Patient verbalized understanding, in agreement with plan, and voiced no further questions. Call back with any new or worsening symptoms, concerns, or questions.    Mindy Pope RN, [facility] Triage Nurse Advisor    Reason for Disposition   COVID-19 diagnosed by positive lab test (e.g., PCR, rapid self-test kit) and mild symptoms (e.g., cough, fever, others) and no complications or SOB    Additional Information   Negative: SEVERE difficulty breathing (e.g., struggling for each breath, speaks in single words)   Negative: Difficult to awaken or acting confused (e.g., disoriented, slurred speech)   Negative: Bluish (or gray) lips or face now   Negative: Shock suspected (e.g., cold/pale/clammy skin, too weak to stand, low BP, rapid pulse)   Negative: Sounds like a life-threatening emergency to the triager   Negative: Chest pain or pressure  (Exception: MILD central chest pain, present only when coughing.)   Negative: Drinking very little and dehydration suspected (e.g., no urine > 12 hours, very dry mouth,  very lightheaded)   Negative: Patient sounds very sick or weak to the triager   Negative: MILD difficulty breathing (e.g., minimal/no SOB at rest, SOB with walking, pulse <100)   Negative: Fever > 103 F (39.4 C)   Negative: Fever > 101 F (38.3 C) and over 60 years of age   Negative: Fever > 100.0 F (37.8 C) and bedridden (e.g., CVA, chronic illness, recovering from surgery)   Negative: HIGH RISK patient (e.g., weak immune system, age > 64 years, obesity with BMI of 30 or higher, pregnant, chronic lung disease or other chronic medical condition) and COVID symptoms (e.g., cough, fever)  (Exceptions: Already seen by doctor or NP/PA and no new or worsening symptoms.)   Negative: HIGH RISK patient and influenza is widespread in the community and ONE OR MORE respiratory symptoms: cough, sore throat, runny or stuffy nose   Negative: HIGH RISK patient and influenza exposure within the last 7 days and ONE OR MORE respiratory symptoms: cough, sore throat, runny or stuffy nose   Negative: Oxygen level (e.g., pulse oximetry) 91 to 94%   Negative: COVID-19 infection suspected by caller or triager and mild symptoms (cough, fever, or others) and negative COVID-19 rapid test   Negative: Fever present > 3 days (72 hours)   Negative: Fever returns after gone for over 24 hours and symptoms worse or not improved   Negative: Continuous (nonstop) coughing interferes with work or school and no improvement using cough treatment per Care Advice   Negative: Cough present > 3 weeks   Negative: COVID-19 diagnosed by positive lab test (e.g., PCR, rapid self-test kit) and NO symptoms (e.g., cough, fever, others)    Protocols used: Coronavirus (COVID-19) Diagnosed or [id number]      COVID 19 Nurse Triage Plan/Patient Instructions    Please be aware that novel coronavirus (COVID-19) may be circulating in the community. If you develop symptoms such as fever, cough, or SOB or if you have concerns about the presence of another infection including  coronavirus (COVID-19), please contact your health care provider or visit [email or web address].     Disposition/Instructions    Home care recommended. Follow home care protocol based instructions.    Thank you for taking steps to prevent the spread of this virus.  Limit your contact with others.  Wear a simple mask to cover your cough.  Wash your hands well and often.    Resources  [facility]: About COVID-19: [email or web address]  CDC: What to Do If You're Sick: www.cdc.gov/coronavirus/2019-ncov/about/steps-when-sick.html  CDC: Ending Home Isolation: www.cdc.gov/coronavirus/2019-ncov/hcp/disposition-in-home-patients.html   CDC: Caring for Someone: www.cdc.gov/coronavirus/2019-ncov/if-you-are-sick/care-for-someone.html   [facility]: Interim Guidance for Hospital Discharge to Home: [email or web address]/diseases/coronavirus/hcp/hospdischarge.pdf  [facility] clinical trials (COVID-19 research studies): [email or web address]-clinical-trials   Below are the COVID-19 hotlines at the [place] Department of [facility] ([facility]). Interpreters are available.   For health questions: Call 743-408-6514 or 1-852.316.8198 (7 a.m. to 7 p.m.)  For questions about schools and childcare: Call 753-106-5379 or 1-966.448.4798 (7 a.m. to 7 p.m.)

## 2023-09-22 ASSESSMENT — ENCOUNTER SYMPTOMS
PALPITATIONS: 0
HEMATURIA: 0
ARTHRALGIAS: 0
CHILLS: 0
ABDOMINAL PAIN: 0
PARESTHESIAS: 0
DIZZINESS: 0
DIARRHEA: 0
EYE PAIN: 0
DYSURIA: 0
BREAST MASS: 0
JOINT SWELLING: 0
HEMATOCHEZIA: 0
SHORTNESS OF BREATH: 0
HEARTBURN: 0
WEAKNESS: 0
FREQUENCY: 0
FEVER: 0
NERVOUS/ANXIOUS: 0
NAUSEA: 0
HEADACHES: 0
MYALGIAS: 0
COUGH: 0
CONSTIPATION: 0
SORE THROAT: 0

## 2023-09-22 ASSESSMENT — ASTHMA QUESTIONNAIRES: ACT_TOTALSCORE: 24

## 2023-09-29 ENCOUNTER — OFFICE VISIT (OUTPATIENT)
Dept: FAMILY MEDICINE | Facility: CLINIC | Age: 48
End: 2023-09-29
Payer: COMMERCIAL

## 2023-09-29 VITALS
TEMPERATURE: 97.3 F | HEIGHT: 71 IN | HEART RATE: 86 BPM | WEIGHT: 181.9 LBS | BODY MASS INDEX: 25.47 KG/M2 | RESPIRATION RATE: 16 BRPM | SYSTOLIC BLOOD PRESSURE: 105 MMHG | OXYGEN SATURATION: 98 % | DIASTOLIC BLOOD PRESSURE: 73 MMHG

## 2023-09-29 DIAGNOSIS — Z83.49 FAMILY HISTORY OF HEMOCHROMATOSIS: ICD-10-CM

## 2023-09-29 DIAGNOSIS — N95.1 MENOPAUSAL SYNDROME (HOT FLASHES): ICD-10-CM

## 2023-09-29 DIAGNOSIS — Z00.00 ROUTINE GENERAL MEDICAL EXAMINATION AT A HEALTH CARE FACILITY: Primary | ICD-10-CM

## 2023-09-29 DIAGNOSIS — Z12.31 VISIT FOR SCREENING MAMMOGRAM: ICD-10-CM

## 2023-09-29 DIAGNOSIS — G47.00 INSOMNIA, UNSPECIFIED TYPE: ICD-10-CM

## 2023-09-29 DIAGNOSIS — M51.26 LUMBAR DISC HERNIATION: ICD-10-CM

## 2023-09-29 DIAGNOSIS — N95.2 VAGINAL ATROPHY: ICD-10-CM

## 2023-09-29 DIAGNOSIS — E06.3 HASHIMOTO'S THYROIDITIS: ICD-10-CM

## 2023-09-29 LAB
ALBUMIN SERPL BCG-MCNC: 4.6 G/DL (ref 3.5–5.2)
ALP SERPL-CCNC: 121 U/L (ref 35–129)
ALT SERPL W P-5'-P-CCNC: 24 U/L (ref 0–70)
ANION GAP SERPL CALCULATED.3IONS-SCNC: 11 MMOL/L (ref 7–15)
AST SERPL W P-5'-P-CCNC: 28 U/L (ref 0–45)
BILIRUB SERPL-MCNC: 0.4 MG/DL
BUN SERPL-MCNC: 9.3 MG/DL (ref 6–20)
CALCIUM SERPL-MCNC: 9.8 MG/DL (ref 8.6–10)
CHLORIDE SERPL-SCNC: 104 MMOL/L (ref 98–107)
CHOLEST SERPL-MCNC: 229 MG/DL
CREAT SERPL-MCNC: 0.86 MG/DL (ref 0.51–1.17)
DEPRECATED HCO3 PLAS-SCNC: 26 MMOL/L (ref 22–29)
EGFRCR SERPLBLD CKD-EPI 2021: 83 ML/MIN/1.73M2
ERYTHROCYTE [DISTWIDTH] IN BLOOD BY AUTOMATED COUNT: 13 % (ref 10–15)
FERRITIN SERPL-MCNC: 45 NG/ML (ref 6–409)
GLUCOSE SERPL-MCNC: 92 MG/DL (ref 70–99)
HCT VFR BLD AUTO: 42.3 % (ref 35–53)
HDLC SERPL-MCNC: 62 MG/DL
HGB BLD-MCNC: 13.9 G/DL (ref 11.7–17.7)
IRON BINDING CAPACITY (ROCHE): 326 UG/DL (ref 240–430)
IRON SATN MFR SERPL: 23 % (ref 15–46)
IRON SERPL-MCNC: 76 UG/DL (ref 37–157)
LDLC SERPL CALC-MCNC: 139 MG/DL
MCH RBC QN AUTO: 29.4 PG (ref 26.5–33)
MCHC RBC AUTO-ENTMCNC: 32.9 G/DL (ref 31.5–36.5)
MCV RBC AUTO: 90 FL (ref 78–100)
NONHDLC SERPL-MCNC: 167 MG/DL
PLATELET # BLD AUTO: 228 10E3/UL (ref 150–450)
POTASSIUM SERPL-SCNC: 4.2 MMOL/L (ref 3.4–5.3)
PROT SERPL-MCNC: 7.5 G/DL (ref 6.4–8.3)
RBC # BLD AUTO: 4.72 10E6/UL (ref 3.8–5.9)
SODIUM SERPL-SCNC: 141 MMOL/L (ref 135–145)
T3FREE SERPL-MCNC: 2.8 PG/ML (ref 2–4.4)
T4 FREE SERPL-MCNC: 0.81 NG/DL (ref 0.9–1.7)
TRIGL SERPL-MCNC: 142 MG/DL
TSH SERPL DL<=0.005 MIU/L-ACNC: 3.08 UIU/ML (ref 0.3–4.2)
WBC # BLD AUTO: 4.3 10E3/UL (ref 4–11)

## 2023-09-29 PROCEDURE — 84443 ASSAY THYROID STIM HORMONE: CPT | Performed by: FAMILY MEDICINE

## 2023-09-29 PROCEDURE — 36415 COLL VENOUS BLD VENIPUNCTURE: CPT | Performed by: FAMILY MEDICINE

## 2023-09-29 PROCEDURE — 84439 ASSAY OF FREE THYROXINE: CPT | Performed by: FAMILY MEDICINE

## 2023-09-29 PROCEDURE — 83540 ASSAY OF IRON: CPT | Performed by: FAMILY MEDICINE

## 2023-09-29 PROCEDURE — 82728 ASSAY OF FERRITIN: CPT | Performed by: FAMILY MEDICINE

## 2023-09-29 PROCEDURE — 84481 FREE ASSAY (FT-3): CPT | Performed by: FAMILY MEDICINE

## 2023-09-29 PROCEDURE — 80053 COMPREHEN METABOLIC PANEL: CPT | Performed by: FAMILY MEDICINE

## 2023-09-29 PROCEDURE — 83550 IRON BINDING TEST: CPT | Performed by: FAMILY MEDICINE

## 2023-09-29 PROCEDURE — 90686 IIV4 VACC NO PRSV 0.5 ML IM: CPT | Performed by: FAMILY MEDICINE

## 2023-09-29 PROCEDURE — 99396 PREV VISIT EST AGE 40-64: CPT | Mod: 25 | Performed by: FAMILY MEDICINE

## 2023-09-29 PROCEDURE — 80061 LIPID PANEL: CPT | Performed by: FAMILY MEDICINE

## 2023-09-29 PROCEDURE — 90471 IMMUNIZATION ADMIN: CPT | Performed by: FAMILY MEDICINE

## 2023-09-29 PROCEDURE — 85027 COMPLETE CBC AUTOMATED: CPT | Performed by: FAMILY MEDICINE

## 2023-09-29 RX ORDER — ESTRADIOL 10 UG/1
10 INSERT VAGINAL
Qty: 24 TABLET | Refills: 3 | Status: SHIPPED | OUTPATIENT
Start: 2023-10-02 | End: 2024-10-02

## 2023-09-29 RX ORDER — VENLAFAXINE HYDROCHLORIDE 75 MG/1
75 CAPSULE, EXTENDED RELEASE ORAL DAILY
Qty: 90 CAPSULE | Refills: 3 | Status: SHIPPED | OUTPATIENT
Start: 2023-09-29 | End: 2024-10-02

## 2023-09-29 RX ORDER — POLYETHYLENE GLYCOL 3350 17 G/17G
1 POWDER, FOR SOLUTION ORAL DAILY
COMMUNITY
Start: 2023-09-29

## 2023-09-29 ASSESSMENT — ENCOUNTER SYMPTOMS
NERVOUS/ANXIOUS: 0
FREQUENCY: 0
MYALGIAS: 0
DYSURIA: 0
JOINT SWELLING: 0
PALPITATIONS: 0
ARTHRALGIAS: 0
ABDOMINAL PAIN: 0
HEMATURIA: 0
WEAKNESS: 0
DIARRHEA: 0
DIZZINESS: 0
NAUSEA: 0
COUGH: 0
SHORTNESS OF BREATH: 0
SORE THROAT: 0
CHILLS: 0
FEVER: 0
HEADACHES: 0
CONSTIPATION: 0
EYE PAIN: 0

## 2023-09-29 ASSESSMENT — PAIN SCALES - GENERAL: PAINLEVEL: NO PAIN (0)

## 2023-09-29 NOTE — RESULT ENCOUNTER NOTE
Dear Marjorie,   Your test results are all back -   -Normal red blood cell (hgb) levels, normal white blood cell count and normal platelet levels.  -LDL(bad) cholesterol level is elevated which can increase your heart disease risk.  A diet high in fat and simple carbohydrates, genetics and being overweight can contribute to this. ADVISE: exercising 150 minutes of aerobic exercise per week (30 minutes for 5 days per week or 50 minutes for 3 days per week are options) and eating a low saturated fat/low carbohydrate diet are helpful to improve this. In 12 months, you should recheck your fasting cholesterol panel by scheduling a lab-only appointment.  -Liver and gallbladder tests are normal (ALT,AST, Alk phos, bilirubin), kidney function is normal (Cr, GFR), sodium is normal, potassium is normal, calcium is normal, glucose is normal.  Thyroid - TSH and T3 is normal but T4 is just slightly low.  Plan to repeat in one year.  -Ferritin and iron are normal.  Let us know if you have any questions.  -Jeanette Rodrigues, DO

## 2023-09-29 NOTE — PROGRESS NOTES
SUBJECTIVE:   CC: Marjorie is an 48 year old who presents for preventive health visit.       Healthy Habits:     Getting at least 3 servings of Calcium per day:  Yes    Bi-annual eye exam:  Yes    Dental care twice a year:  Yes    Sleep apnea or symptoms of sleep apnea:  None    Diet:  Vegetarian/vegan    Frequency of exercise:  4-5 days/week    Duration of exercise:  30-45 minutes    Taking medications regularly:  Yes    Medication side effects:  None    Additional concerns today:  Yes  Answers submitted by the patient for this visit:  Annual Preventive Visit (Submitted on 9/22/2023)  Chief Complaint: Annual Exam:  Blood in stool: No  heartburn: No  peripheral edema: No  mood changes: No  Skin sensation changes: No  pelvic pain: No  vaginal bleeding: No  vaginal discharge: No  tenderness: No  breast mass: No  breast discharge: No  impotence: No  penile discharge: No      Concern with shooting/sharp pain that has been present on the left side of the neck   - Frequency : Daily but intermittent   - Noticeable when they move a certain way           -------------------------------------  Have you ever done Advance Care Planning? (For example, a Health Directive, POLST, or a discussion with a medical provider or your loved ones about your wishes): Yes, advance care planning is on file.    Social History     Tobacco Use    Smoking status: Never    Smokeless tobacco: Never   Substance Use Topics    Alcohol use: Yes     Comment: [place].             9/22/2023     2:04 PM   Alcohol Use   Prescreen: >3 drinks/day or >7 drinks/week? No       Reviewed orders with patient.  Reviewed health maintenance and updated orders accordingly - Yes  Patient Active Problem List   Diagnosis    Allergic rhinitis due to pollen    Family history of other cardiovascular diseases    Low back pain    Hypertriglyceridemia    CARDIOVASCULAR SCREENING; LDL GOAL LESS THAN 160    Intermittent asthma    Insomnia    SVT (supraventricular tachycardia)  (H)    Family history of hemochromatosis    Hashimoto's thyroiditis    Thyroid enlargement    Thyroid nodule    ACP (advance care planning)    BCC (basal cell carcinoma), face     Past Surgical History:   Procedure Laterality Date    COLONOSCOPY N/A 2023    Procedure: COLONOSCOPY, WITH POLYPECTOMY;  Surgeon: Vesta Conner MD;  Location: [facility] OR    ENT SURGERY      2 surgeries for deviated septum    SURGICAL HISTORY OF -       wisdom teeth extraction    ZZC APPENDECTOMY  1989    ZZC NONSPECIFIC PROCEDURE  1990    Nasal surgery for deviated septum    ZZC NONSPECIFIC PROCEDURE  1990    Nasal Surgery for deviated septum       Social History     Tobacco Use    Smoking status: Never    Smokeless tobacco: Never   Substance Use Topics    Alcohol use: Yes     Comment: Occas.     Family History   Problem Relation Age of Onset    Diabetes Mother         Type 2    Arthritis Mother     Depression Mother         anxiety/panic attacks as well    Obesity Mother     Thyroid Disease Mother         dx ~35yrs    Hypertension Mother     Hyperlipidemia Mother     Skin Cancer Mother     Other Cancer Mother         skin cancer - basal and squamous    C.A.D. Father         MI at age 54    Gastrointestinal Disease Father         Ulcer H-Pylori?    Lipids Father     Hypertension Father     Hyperlipidemia Father     Genetic Disorder Father     Arthritis Maternal Grandmother     C.A.D. Maternal Grandfather         Artherosclerosis- cause of death, unsure if previous MI    Diabetes Maternal Grandfather         Type 2    Eye Disorder Maternal Grandfather         Glaucoma    Arthritis Sister     Depression Sister     Neurologic Disorder Sister         Migraines    Lipids Sister     Cerebrovascular Disease No family hx of     Breast Cancer No family hx of         Mother had biopsy , found non-malignant    Cancer - colorectal No family hx of            Breast Cancer Screenin/21/2021     8:43 PM   Breast CA Risk  Assessment (FHS-7)   Do you have a family history of breast, colon, or ovarian cancer? No / Unknown         Mammogram Screening: Recommended annual mammography  Pertinent mammograms are reviewed under the imaging tab.    History of abnormal Pap smear: NO - age 30-65 PAP every 5 years with negative HPV co-testing recommended      Latest Ref Rng & Units 9/28/2022    11:23 AM 6/20/2018    10:28 AM 6/20/2018     9:03 AM   PAP / HPV   PAP  Negative for Intraepithelial Lesion or Malignancy (NILM)      PAP (Historical)    NIL    HPV 16 DNA Negative Negative  Negative     HPV 18 DNA Negative Negative  Negative     Other HR HPV Negative Negative  Negative       Reviewed and updated as needed this visit by clinical staff                  Reviewed and updated as needed this visit by Provider                     Review of Systems   Constitutional:  Negative for chills and fever.   HENT:  Negative for congestion, ear pain, hearing loss and sore throat.    Eyes:  Negative for pain and visual disturbance.   Respiratory:  Negative for cough and shortness of breath.    Cardiovascular:  Negative for chest pain and palpitations.   Gastrointestinal:  Negative for abdominal pain, constipation, diarrhea and nausea.   Genitourinary:  Negative for dysuria, frequency, genital sores, hematuria and urgency.   Musculoskeletal:  Negative for arthralgias, joint swelling and myalgias.   Skin:  Negative for rash.   Neurological:  Negative for dizziness, weakness and headaches.   Psychiatric/Behavioral:  The patient is not nervous/anxious.           OBJECTIVE:   LMP 07/05/2020 (Exact Date)   Physical Exam  GENERAL APPEARANCE: healthy, alert and no distress  EYES: Eyes grossly normal to inspection, PERRL and conjunctivae and sclerae normal  HENT: ear canals and TM's normal, nose and mouth without ulcers or lesions, oropharynx clear and oral mucous membranes moist  NECK: no adenopathy, no asymmetry, masses, or scars and thyroid normal to  palpation  RESP: lungs clear to auscultation - no rales, rhonchi or wheezes  BREAST: normal without masses, tenderness or nipple discharge and no palpable axillary masses or adenopathy  CV: regular rate and rhythm, normal S1 S2, no S3 or S4, no murmur, click or rub, no peripheral edema and peripheral pulses strong  ABDOMEN: soft, nontender, no hepatosplenomegaly, no masses and bowel sounds normal  MS: no musculoskeletal defects are noted and gait is age appropriate without ataxia  SKIN: no suspicious lesions or rashes  NEURO: Normal strength and tone, sensory exam grossly normal, mentation intact and speech normal  PSYCH: mentation appears normal and affect normal/bright    Diagnostic Test Results:  Labs reviewed in Epic    ASSESSMENT/PLAN:   (Z00.00) Routine general medical examination at a health care facility  (primary encounter diagnosis)  Comment:    Plan: Lipid panel reflex to direct LDL Fasting,         Comprehensive metabolic panel (BMP + Alb, Alk         Phos, ALT, AST, Total. Bili, TP)             (Z12.31) Visit for screening mammogram  Comment:    Plan: Comprehensive metabolic panel (BMP + Alb, Alk         Phos, ALT, AST, Total. Bili, TP)             (N95.1) Menopausal syndrome (hot flashes)  Comment:    Plan: Comprehensive metabolic panel (BMP + Alb, Alk         Phos, ALT, AST, Total. Bili, TP), venlafaxine         (EFFEXOR XR) 75 MG 24 hr capsule             (N95.2) Vaginal atrophy  Comment:    Plan: Comprehensive metabolic panel (BMP + Alb, Alk         Phos, ALT, AST, Total. Bili, TP), estradiol         (VAGIFEM) 10 MCG TABS vaginal tablet        Refilled     (M51.26) Lumbar disc herniation  Comment:  refilled   Plan: Comprehensive metabolic panel (BMP + Alb, Alk         Phos, ALT, AST, Total. Bili, TP), tiZANidine         (ZANAFLEX) 4 MG tablet             (G47.00) Insomnia, unspecified type  Comment: refilled   Plan: Comprehensive metabolic panel (BMP + Alb, Alk         Phos, ALT, AST, Total. Bili,  "TP), amitriptyline        (ELAVIL) 25 MG tablet             (E06.3) Hashimoto's thyroiditis  Comment: pending labs   Plan: TSH, T4, free, T3, Free             (Z83.49) Family history of hemochromatosis  Comment:    Plan: CBC with platelets, Ferritin, Iron and iron         binding capacity                   COUNSELING:  Reviewed preventive health counseling, as reflected in patient instructions      BMI:   Estimated body mass index is 26.5 kg/m  as calculated from the following:    Height as of 6/13/23: 1.803 m (5' 11\").    Weight as of 6/13/23: 86.2 kg (190 lb).   Weight management plan: Discussed healthy diet and exercise guidelines      Marjorie Wolf reports that Marjorie Wolf has never smoked. Marjorie Wolf has never used smokeless tobacco.          Jeanette Rodrigues, [facility]  "

## 2023-10-27 ENCOUNTER — ANCILLARY PROCEDURE (OUTPATIENT)
Dept: MAMMOGRAPHY | Facility: CLINIC | Age: 48
End: 2023-10-27
Attending: FAMILY MEDICINE
Payer: COMMERCIAL

## 2023-10-27 DIAGNOSIS — Z12.31 VISIT FOR SCREENING MAMMOGRAM: ICD-10-CM

## 2023-10-27 PROCEDURE — 77063 BREAST TOMOSYNTHESIS BI: CPT | Mod: GC | Performed by: STUDENT IN AN ORGANIZED HEALTH CARE EDUCATION/TRAINING PROGRAM

## 2023-10-27 PROCEDURE — 77067 SCR MAMMO BI INCL CAD: CPT | Mod: GC | Performed by: STUDENT IN AN ORGANIZED HEALTH CARE EDUCATION/TRAINING PROGRAM

## 2024-09-03 NOTE — PROGRESS NOTES
[facility] Dermatology Note  Encounter Date: Sep 4, 2024  Office Visit     Dermatology Problem List:  Last FSE: 9/4/24  1. Compound dysplastic nevus with mild atypia, left medial mid back, s/p shave bx 5/13/2021  2. BCC, left nasal sidewall,  - MMS 10/26/22     ____________________________________________    Assessment & Plan:  # HX of BCC,  left nasal sidewall, MMS 10/26/22  - No signs of recurrence.     # Hx dysplastic nevus, no evidence of recurrent disease  - Continue regular skin exams    # Skin cancer screening with multiple benign nevi, solar lentigines    - ABCDEs: Counseled ABCDEs of melanoma: Asymmetry, Border (irregularity), Color (not uniform, changes in color), Diameter (greater than 6 mm which is about the size of a pencil eraser), and Evolving (any changes in preexisting moles).  - Sun protection: Counseled SPF30+ sunscreen, UPF clothing, sun avoidance, tanning bed avoidance.    # Cherry angiomas and seborrheic keratoses,  Benign, reassurance given.      # Plantar corns-no evidence of warts today   - Recommend pumis stone       Procedures Performed:   None    Follow-up: 1 year(s) in-person, or earlier for new or changing lesions    Staff and Scribe:     Scribe Disclosure:   I, Corazon Haines, am serving as a scribe; to document services personally performed by Heather Ricardo PA-C -based on data collection and the provider's statements to me.     Provider Disclosure:  I agree with above History, Review of Systems, Physical exam and Plan.  I have reviewed the content of the documentation and have edited it as needed. I have personally performed the services documented here and the documentation accurately represents those services and the decisions I have made.      Electronically signed by:  All risk, benefits and alternatives were discussed with patient.  Patient is in agreement and understands the assessment and plan.  All questions were answered.  Sun Screen Education was  given.   Return to Clinic annually or sooner as needed.   Heather Ricardo PA-C   ______________________________________    CC: Derm Problem (FBSE- spots of concern: back of R thigh, L knee, mole on neck, a few spots around nose)    HPI:    Marjorie Wolf is a(n) 49 year old adult who presents today as a return patient for skin check  Last seen in clinic by myself on 8/28/23.    Today, she has spots of concern on the back of the right thigh, left knee and mole on the neck.   She states that she  did not try the otc salicylic acid for her warts.     Patient is otherwise feeling well, without additional skin concerns.    Labs Reviewed:  N/A    Physical Exam:  Vitals: LMP 07/05/2020 (Exact Date)   SKIN: Full skin, which includes the head/face, both arms, chest, back, abdomen,both legs, genitalia and/or groin buttocks, digits and/or nails, was examined.  -Oval scar on the left nasal side wall without erythema, scale or nodularity.   - There are dome shaped bright red papules on the head/neck, trunk, extremities.   - Multiple regular brown pigmented macules and papules are identified on the head/neck, trunk, extremities.   - Scattered brown macules on sun exposed areas.  - There are waxy stuck on tan to brown papules on the head/neck, trunk, extremities.   - scattered pinpoint hyperkeratotic papules right plantar surface, most consistent with horns  - No other lesions of concern on areas examined.     Medications:  Current Outpatient Medications   Medication Sig Dispense Refill    amitriptyline (ELAVIL) 25 MG tablet TAKE 3 TABLETS BY MOUTH NIGHTLY AS NEEDED FOR SLEEP 270 tablet 3    estradiol (VAGIFEM) 10 MCG TABS vaginal tablet Place 1 tablet (10 mcg) vaginally twice a week 24 tablet 3    Lactase (LACTAID PO)       levalbuterol (XOPENEX HFA) 45 MCG/ACT inhaler Inhale 2 puffs into the lungs every 4 hours as needed for shortness of breath / dyspnea or wheezing 15 g 2    meloxicam (MOBIC) 15 MG tablet TAKE 1 TABLET BY  MOUTH EVERY DAY 90 tablet 0    polyethylene glycol (MIRALAX) 17 GM/Dose powder Take 17 g (1 Capful) by mouth daily      tiZANidine (ZANAFLEX) 4 MG tablet Take 1 tablet (4 mg) by mouth nightly as needed for muscle spasms 90 tablet 3    UNKNOWN MED DOSAGE digestive advantage for lactose intolorance - 1 tab daily      venlafaxine (EFFEXOR XR) 75 MG 24 hr capsule Take 1 capsule (75 mg) by mouth daily 90 capsule 3    VITAMIN D, CHOLECALCIFEROL, PO Take by mouth daily        No current facility-administered medications for this visit.      Past Medical History:   Patient Active Problem List   Diagnosis    Allergic rhinitis due to pollen    Family history of other cardiovascular diseases    Low back pain    Hypertriglyceridemia    CARDIOVASCULAR SCREENING; LDL GOAL LESS THAN 160    Intermittent asthma    Insomnia    SVT (supraventricular tachycardia) (H24)    Family history of hemochromatosis    Hashimoto's thyroiditis    Thyroid enlargement    Thyroid nodule    ACP (advance care planning)    BCC (basal cell carcinoma), face     Past Medical History:   Diagnosis Date    Allergic rhinitis due to other allergen     Arthritis     BCC (basal cell carcinoma), face 9/28/2022    Insomnia     benadryl nightly (50mg), se's on trazodone    Mild intermittent asthma     uses maxair 2-3x/yr    Personal history of other diseases of circulatory system 1995    Virally induced stroke- high fever, resolved completely    SVT (supraventricular tachycardia) (H24)     Thyroid disease May 2014    Hypothyroidism        CC No referring provider defined for this encounter. on close of this encounter.

## 2024-09-04 ENCOUNTER — OFFICE VISIT (OUTPATIENT)
Dept: DERMATOLOGY | Facility: CLINIC | Age: 49
End: 2024-09-04
Payer: COMMERCIAL

## 2024-09-04 DIAGNOSIS — Z85.828 HISTORY OF BASAL CELL CARCINOMA: ICD-10-CM

## 2024-09-04 DIAGNOSIS — L81.4 LENTIGINES: ICD-10-CM

## 2024-09-04 DIAGNOSIS — D22.9 MULTIPLE BENIGN NEVI: Primary | ICD-10-CM

## 2024-09-04 DIAGNOSIS — L84 CORN OF FOOT: ICD-10-CM

## 2024-09-04 DIAGNOSIS — L82.1 SEBORRHEIC KERATOSES: ICD-10-CM

## 2024-09-04 DIAGNOSIS — D18.01 CHERRY ANGIOMA: ICD-10-CM

## 2024-09-04 DIAGNOSIS — Z86.018 HISTORY OF DYSPLASTIC NEVUS: ICD-10-CM

## 2024-09-04 DIAGNOSIS — Z12.83 SCREENING EXAM FOR SKIN CANCER: ICD-10-CM

## 2024-09-04 PROCEDURE — 99213 OFFICE O/P EST LOW 20 MIN: CPT | Performed by: PHYSICIAN ASSISTANT

## 2024-09-04 ASSESSMENT — PAIN SCALES - GENERAL: PAINLEVEL: NO PAIN (0)

## 2024-09-04 NOTE — LETTER
9/4/2024       RE: Marjorie Wolf  [address] [facility] [address]     Dear Colleague,    Thank you for referring your patient, Marjorie Wolf, to the [facility] [place] at [facility]. Please see a copy of my visit note below.    [facility] Dermatology Note  Encounter Date: Sep 4, 2024  Office Visit     Dermatology Problem List:  Last FSE: 9/4/24  1. Compound dysplastic nevus with mild atypia, left medial mid back, s/p shave bx 5/13/2021  2. BCC, left nasal sidewall,  - MMS 10/26/22     ____________________________________________    Assessment & Plan:  # HX of BCC,  left nasal sidewall, MMS 10/26/22  - No signs of recurrence.     # Hx dysplastic nevus, no evidence of recurrent disease  - Continue regular skin exams    # Skin cancer screening with multiple benign nevi, solar lentigines    - ABCDEs: Counseled ABCDEs of melanoma: Asymmetry, Border (irregularity), Color (not uniform, changes in color), Diameter (greater than 6 mm which is about the size of a pencil eraser), and Evolving (any changes in preexisting moles).  - Sun protection: Counseled SPF30+ sunscreen, UPF clothing, sun avoidance, tanning bed avoidance.    # Cherry angiomas and seborrheic keratoses,  Benign, reassurance given.      # Plantar corns-no evidence of warts today   - Recommend pumis stone       Procedures Performed:   None    Follow-up: 1 year(s) in-person, or earlier for new or changing lesions    Staff and Scribe:     Scribe Disclosure:   I, Corazon Haines, am serving as a scribe; to document services personally performed by Heather Ricardo PA-C -based on data collection and the provider's statements to me.     Provider Disclosure:  I agree with above History, Review of Systems, Physical exam and Plan.  I have reviewed the content of the documentation and have edited it as needed. I have personally performed the services  documented here and the documentation accurately represents those services and the decisions I have made.      Electronically signed by:  All risk, benefits and alternatives were discussed with patient.  Patient is in agreement and understands the assessment and plan.  All questions were answered.  Sun Screen Education was given.   Return to Clinic annually or sooner as needed.   Heather Ricardo PA-C   ______________________________________    CC: Derm Problem (FBSE- spots of concern: back of R thigh, L knee, mole on neck, a few spots around nose)    HPI:    Marjorie Wolf is a(n) 49 year old adult who presents today as a return patient for skin check  Last seen in clinic by myself on 8/28/23.    Today, she has spots of concern on the back of the right thigh, left knee and mole on the neck.   She states that she  did not try the otc salicylic acid for her warts.     Patient is otherwise feeling well, without additional skin concerns.    Labs Reviewed:  N/A    Physical Exam:  Vitals: LMP 07/05/2020 (Exact Date)   SKIN: Full skin, which includes the head/face, both arms, chest, back, abdomen,both legs, genitalia and/or groin buttocks, digits and/or nails, was examined.  -Oval scar on the left nasal side wall without erythema, scale or nodularity.   - There are dome shaped bright red papules on the head/neck, trunk, extremities.   - Multiple regular brown pigmented macules and papules are identified on the head/neck, trunk, extremities.   - Scattered brown macules on sun exposed areas.  - There are waxy stuck on tan to brown papules on the head/neck, trunk, extremities.   - scattered pinpoint hyperkeratotic papules right plantar surface, most consistent with horns  - No other lesions of concern on areas examined.     Medications:  Current Outpatient Medications   Medication Sig Dispense Refill     amitriptyline (ELAVIL) 25 MG tablet TAKE 3 TABLETS BY MOUTH NIGHTLY AS NEEDED FOR SLEEP 270 tablet 3     estradiol  (VAGIFEM) 10 MCG TABS vaginal tablet Place 1 tablet (10 mcg) vaginally twice a week 24 tablet 3     Lactase (LACTAID PO)        levalbuterol (XOPENEX HFA) 45 MCG/ACT inhaler Inhale 2 puffs into the lungs every 4 hours as needed for shortness of breath / dyspnea or wheezing 15 g 2     meloxicam (MOBIC) 15 MG tablet TAKE 1 TABLET BY MOUTH EVERY DAY 90 tablet 0     polyethylene glycol (MIRALAX) 17 GM/Dose powder Take 17 g (1 Capful) by mouth daily       tiZANidine (ZANAFLEX) 4 MG tablet Take 1 tablet (4 mg) by mouth nightly as needed for muscle spasms 90 tablet 3     UNKNOWN MED DOSAGE digestive advantage for lactose intolorance - 1 tab daily       venlafaxine (EFFEXOR XR) 75 MG 24 hr capsule Take 1 capsule (75 mg) by mouth daily 90 capsule 3     VITAMIN D, CHOLECALCIFEROL, PO Take by mouth daily        No current facility-administered medications for this visit.      Past Medical History:   Patient Active Problem List   Diagnosis     Allergic rhinitis due to pollen     Family history of other cardiovascular diseases     Low back pain     Hypertriglyceridemia     CARDIOVASCULAR SCREENING; LDL GOAL LESS THAN 160     Intermittent asthma     Insomnia     SVT (supraventricular tachycardia) (H24)     Family history of hemochromatosis     Hashimoto's thyroiditis     Thyroid enlargement     Thyroid nodule     ACP (advance care planning)     BCC (basal cell carcinoma), face     Past Medical History:   Diagnosis Date     Allergic rhinitis due to other allergen      Arthritis      BCC (basal cell carcinoma), face 9/28/2022     Insomnia     benadryl nightly (50mg), se's on trazodone     Mild intermittent asthma     uses maxair 2-3x/yr     Personal history of other diseases of circulatory system 1995    Virally induced stroke- high fever, resolved completely     SVT (supraventricular tachycardia) (H24)      Thyroid disease May 2014    Hypothyroidism        CC No referring provider defined for this encounter. on close of this  encounter.       Again, thank you for allowing me to participate in the care of your patient.      Sincerely,    Heather Ricardo PA-C

## 2024-09-04 NOTE — NURSING NOTE
Dermatology Rooming Note    Marjorie Wolf's goals for this visit include:   Chief Complaint   Patient presents with    Derm Problem     FBSE- spots of concern: back of R thigh, L knee, mole on neck, a few spots around nose     BARBARA Vasquez

## 2024-09-04 NOTE — PATIENT INSTRUCTIONS

## 2024-09-25 ENCOUNTER — MYC MEDICAL ADVICE (OUTPATIENT)
Dept: FAMILY MEDICINE | Facility: CLINIC | Age: 49
End: 2024-09-25
Payer: COMMERCIAL

## 2024-09-30 ENCOUNTER — APPOINTMENT (OUTPATIENT)
Dept: ULTRASOUND IMAGING | Facility: CLINIC | Age: 49
End: 2024-09-30
Attending: EMERGENCY MEDICINE
Payer: COMMERCIAL

## 2024-09-30 ENCOUNTER — HOSPITAL ENCOUNTER (EMERGENCY)
Facility: CLINIC | Age: 49
Discharge: HOME | End: 2024-09-30
Attending: EMERGENCY MEDICINE | Admitting: EMERGENCY MEDICINE
Payer: COMMERCIAL

## 2024-09-30 ENCOUNTER — APPOINTMENT (OUTPATIENT)
Dept: CT IMAGING | Facility: CLINIC | Age: 49
End: 2024-09-30
Attending: EMERGENCY MEDICINE
Payer: COMMERCIAL

## 2024-09-30 VITALS
SYSTOLIC BLOOD PRESSURE: 130 MMHG | OXYGEN SATURATION: 97 % | RESPIRATION RATE: 16 BRPM | TEMPERATURE: 98.4 F | HEART RATE: 71 BPM | WEIGHT: 192 LBS | HEIGHT: 71 IN | DIASTOLIC BLOOD PRESSURE: 74 MMHG | BODY MASS INDEX: 26.88 KG/M2

## 2024-09-30 DIAGNOSIS — E78.1 HYPERTRIGLYCERIDEMIA: ICD-10-CM

## 2024-09-30 DIAGNOSIS — R10.9 RIGHT FLANK PAIN: ICD-10-CM

## 2024-09-30 DIAGNOSIS — R10.9 RIGHT SIDED ABDOMINAL PAIN: ICD-10-CM

## 2024-09-30 DIAGNOSIS — E06.3 HASHIMOTO'S THYROIDITIS: ICD-10-CM

## 2024-09-30 LAB
ALBUMIN SERPL BCG-MCNC: 4 G/DL (ref 3.5–5.2)
ALBUMIN UR-MCNC: 10 MG/DL
ALP SERPL-CCNC: 104 U/L (ref 40–150)
ALT SERPL W P-5'-P-CCNC: 47 U/L (ref 0–70)
AMORPH CRY #/AREA URNS HPF: ABNORMAL /HPF
ANION GAP SERPL CALCULATED.3IONS-SCNC: 13 MMOL/L (ref 7–15)
APPEARANCE UR: ABNORMAL
AST SERPL W P-5'-P-CCNC: 47 U/L (ref 0–45)
BACTERIA #/AREA URNS HPF: ABNORMAL /HPF
BASOPHILS # BLD AUTO: 0.1 10E3/UL (ref 0–0.2)
BASOPHILS NFR BLD AUTO: 1 %
BILIRUB DIRECT SERPL-MCNC: <0.2 MG/DL (ref 0–0.3)
BILIRUB SERPL-MCNC: 0.2 MG/DL
BILIRUB UR QL STRIP: NEGATIVE
BUN SERPL-MCNC: 10.9 MG/DL (ref 6–20)
CA CARBONATE CRY #/AREA URNS HPF: ABNORMAL /HPF
CA PHOS CRY #/AREA URNS HPF: ABNORMAL /HPF
CALCIUM SERPL-MCNC: 9 MG/DL (ref 8.8–10.4)
CAOX CRY #/AREA URNS HPF: ABNORMAL /HPF
CELLULAR CAST: 0 /LPF
CHLORIDE SERPL-SCNC: 105 MMOL/L (ref 98–107)
COLOR UR AUTO: ABNORMAL
CREAT SERPL-MCNC: 0.73 MG/DL (ref 0.51–1.17)
CRYSTALS #/AREA URNS HPF: ABNORMAL /HPF
EGFRCR SERPLBLD CKD-EPI 2021: >90 ML/MIN/1.73M2
EOSINOPHIL # BLD AUTO: 0.2 10E3/UL (ref 0–0.7)
EOSINOPHIL NFR BLD AUTO: 3 %
EPITHELIAL CASTS: 0 /LPF
ERYTHROCYTE [DISTWIDTH] IN BLOOD BY AUTOMATED COUNT: 13.3 % (ref 10–15)
FATTY CAST: 0 /LPF
GLUCOSE SERPL-MCNC: 115 MG/DL (ref 70–99)
GLUCOSE UR STRIP-MCNC: NEGATIVE MG/DL
GRANULAR CAST: 0 /LPF
HCG SERPL QL: NEGATIVE
HCG UR QL: NEGATIVE
HCO3 SERPL-SCNC: 22 MMOL/L (ref 22–29)
HCT VFR BLD AUTO: 40.3 % (ref 35–53)
HGB BLD-MCNC: 12.9 G/DL (ref 11.7–17.7)
HGB UR QL STRIP: NEGATIVE
HOLD SPECIMEN: NORMAL
HYALINE CASTS: 0 /LPF
IMM GRANULOCYTES # BLD: 0.1 10E3/UL
IMM GRANULOCYTES NFR BLD: 1 %
KETONES UR STRIP-MCNC: NEGATIVE MG/DL
LEUCINE CRYSTALS: ABNORMAL /HPF
LEUKOCYTE ESTERASE UR QL STRIP: NEGATIVE
LIPASE SERPL-CCNC: 48 U/L (ref 13–60)
LYMPHOCYTES # BLD AUTO: 1.7 10E3/UL (ref 0.8–5.3)
LYMPHOCYTES NFR BLD AUTO: 25 %
MCH RBC QN AUTO: 29.8 PG (ref 26.5–33)
MCHC RBC AUTO-ENTMCNC: 32 G/DL (ref 31.5–36.5)
MCV RBC AUTO: 93 FL (ref 78–100)
MONOCYTES # BLD AUTO: 0.4 10E3/UL (ref 0–1.3)
MONOCYTES NFR BLD AUTO: 6 %
MUCOUS THREADS #/AREA URNS LPF: PRESENT /LPF
NEUTROPHILS # BLD AUTO: 4.5 10E3/UL (ref 1.6–8.3)
NEUTROPHILS NFR BLD AUTO: 65 %
NITRATE UR QL: NEGATIVE
NRBC # BLD AUTO: 0 10E3/UL
NRBC BLD AUTO-RTO: 0 /100
OVAL FAT BODIES #/AREA URNS HPF: ABNORMAL /HPF
PH UR STRIP: 7.5 [PH] (ref 5–7)
PLATELET # BLD AUTO: 189 10E3/UL (ref 150–450)
POTASSIUM SERPL-SCNC: 4.1 MMOL/L (ref 3.4–5.3)
PROT SERPL-MCNC: 6.7 G/DL (ref 6.4–8.3)
RBC # BLD AUTO: 4.33 10E6/UL (ref 3.8–5.9)
RBC CAST: 0 /LPF
RBC CLUMPS #/AREA URNS HPF: ABNORMAL /HPF
RBC URINE: 0 /HPF
RENAL TUB EPI: 0 /HPF
RENAL TUB FAT CAST: 0 /LPF
SODIUM SERPL-SCNC: 140 MMOL/L (ref 135–145)
SP GR UR STRIP: 1.02 (ref 1–1.03)
SPERM #/AREA URNS HPF: ABNORMAL /HPF
SQUAMOUS EPITHELIAL: 2 /HPF
TRANSITIONAL EPI: 0 /HPF
TRI-PHOS CRY #/AREA URNS HPF: ABNORMAL /HPF
TRICHOMONAS #/AREA URNS HPF: ABNORMAL /HPF
TYROSINE CRYSTALS: ABNORMAL /HPF
URATE CRY #/AREA URNS HPF: ABNORMAL /HPF
UROBILINOGEN UR STRIP-MCNC: NORMAL MG/DL
WAXY CAST: 0 /LPF
WBC # BLD AUTO: 6.9 10E3/UL (ref 4–11)
WBC CAST: 0 /LPF
WBC CLUMPS #/AREA URNS HPF: ABNORMAL /HPF
WBC URINE: 3 /HPF
YEAST #/AREA URNS HPF: ABNORMAL /HPF
YEAST #/AREA URNS HPF: ABNORMAL /HPF

## 2024-09-30 PROCEDURE — 80061 LIPID PANEL: CPT

## 2024-09-30 PROCEDURE — 36415 COLL VENOUS BLD VENIPUNCTURE: CPT | Performed by: EMERGENCY MEDICINE

## 2024-09-30 PROCEDURE — 96374 THER/PROPH/DIAG INJ IV PUSH: CPT | Performed by: EMERGENCY MEDICINE

## 2024-09-30 PROCEDURE — 84703 CHORIONIC GONADOTROPIN ASSAY: CPT | Performed by: EMERGENCY MEDICINE

## 2024-09-30 PROCEDURE — 74176 CT ABD & PELVIS W/O CONTRAST: CPT

## 2024-09-30 PROCEDURE — 84443 ASSAY THYROID STIM HORMONE: CPT

## 2024-09-30 PROCEDURE — 82248 BILIRUBIN DIRECT: CPT | Performed by: EMERGENCY MEDICINE

## 2024-09-30 PROCEDURE — 84439 ASSAY OF FREE THYROXINE: CPT

## 2024-09-30 PROCEDURE — 81001 URINALYSIS AUTO W/SCOPE: CPT | Performed by: EMERGENCY MEDICINE

## 2024-09-30 PROCEDURE — 99285 EMERGENCY DEPT VISIT HI MDM: CPT | Performed by: EMERGENCY MEDICINE

## 2024-09-30 PROCEDURE — 99285 EMERGENCY DEPT VISIT HI MDM: CPT | Mod: 25 | Performed by: EMERGENCY MEDICINE

## 2024-09-30 PROCEDURE — 80048 BASIC METABOLIC PNL TOTAL CA: CPT | Performed by: EMERGENCY MEDICINE

## 2024-09-30 PROCEDURE — 83690 ASSAY OF LIPASE: CPT | Performed by: EMERGENCY MEDICINE

## 2024-09-30 PROCEDURE — 258N000003 HC RX IP 258 OP 636: Performed by: EMERGENCY MEDICINE

## 2024-09-30 PROCEDURE — 81025 URINE PREGNANCY TEST: CPT | Performed by: EMERGENCY MEDICINE

## 2024-09-30 PROCEDURE — 76857 US EXAM PELVIC LIMITED: CPT | Mod: 26 | Performed by: STUDENT IN AN ORGANIZED HEALTH CARE EDUCATION/TRAINING PROGRAM

## 2024-09-30 PROCEDURE — 84481 FREE ASSAY (FT-3): CPT

## 2024-09-30 PROCEDURE — 76857 US EXAM PELVIC LIMITED: CPT

## 2024-09-30 PROCEDURE — 74176 CT ABD & PELVIS W/O CONTRAST: CPT | Mod: 26 | Performed by: RADIOLOGY

## 2024-09-30 PROCEDURE — 96361 HYDRATE IV INFUSION ADD-ON: CPT | Performed by: EMERGENCY MEDICINE

## 2024-09-30 PROCEDURE — 250N000011 HC RX IP 250 OP 636: Performed by: EMERGENCY MEDICINE

## 2024-09-30 PROCEDURE — 85025 COMPLETE CBC W/AUTO DIFF WBC: CPT | Performed by: EMERGENCY MEDICINE

## 2024-09-30 PROCEDURE — 86376 MICROSOMAL ANTIBODY EACH: CPT

## 2024-09-30 RX ORDER — KETOROLAC TROMETHAMINE 15 MG/ML
15 INJECTION, SOLUTION INTRAMUSCULAR; INTRAVENOUS ONCE
Status: COMPLETED | OUTPATIENT
Start: 2024-09-30 | End: 2024-09-30

## 2024-09-30 RX ADMIN — SODIUM CHLORIDE 1000 ML: 9 INJECTION, SOLUTION INTRAVENOUS at 04:26

## 2024-09-30 RX ADMIN — KETOROLAC TROMETHAMINE 15 MG: 15 INJECTION, SOLUTION INTRAMUSCULAR; INTRAVENOUS at 04:25

## 2024-09-30 ASSESSMENT — ACTIVITIES OF DAILY LIVING (ADL)
ADLS_ACUITY_SCORE: 35
ADLS_ACUITY_SCORE: 33
ADLS_ACUITY_SCORE: 35
ADLS_ACUITY_SCORE: 33
ADLS_ACUITY_SCORE: 35
ADLS_ACUITY_SCORE: 33

## 2024-09-30 ASSESSMENT — COLUMBIA-SUICIDE SEVERITY RATING SCALE - C-SSRS
1. IN THE PAST MONTH, HAVE YOU WISHED YOU WERE DEAD OR WISHED YOU COULD GO TO SLEEP AND NOT WAKE UP?: NO
2. HAVE YOU ACTUALLY HAD ANY THOUGHTS OF KILLING YOURSELF IN THE PAST MONTH?: NO
6. HAVE YOU EVER DONE ANYTHING, STARTED TO DO ANYTHING, OR PREPARED TO DO ANYTHING TO END YOUR LIFE?: NO

## 2024-09-30 NOTE — ED TRIAGE NOTES
Patient BIBA from home with c/o back pain, abdominal pain and nausea/vomiting. Patient had back pain for over 2 weeks now but tonight, pain started to radiate to the abdomen. Rates pain 8/10 at rest.    Hx. Appendectomy.     Received from EMS;    4 mg Zofran  25 mcg Fentanyl  500 mL NaCl     Triage Assessment (Adult)       Row Name 09/30/24 0004          Triage Assessment    Airway WDL WDL        Respiratory WDL    Respiratory WDL WDL        Skin Circulation/Temperature WDL    Skin Circulation/Temperature WDL WDL        Cardiac WDL    Cardiac WDL WDL        Peripheral/Neurovascular WDL    Peripheral Neurovascular WDL WDL        Cognitive/Neuro/Behavioral WDL    Cognitive/Neuro/Behavioral WDL WDL

## 2024-09-30 NOTE — DISCHARGE INSTRUCTIONS
Thank you for your patience today.  Please follow-up with your regular doctor in the next 2-3 days for further evaluation and follow-up care.  Please call to schedule an appointment.  Please continue your own medications.  Please alternate taking Tylenol 1000 mg and ibuprofen 600 mg every 6 hours as needed for pain.  Please return to the ER if you develop high fever, persistent vomiting, severe pain, or any worsening of your current symptoms.  It was a pleasure taking care of you today.  We hope you feel better soon.

## 2024-09-30 NOTE — ED PROVIDER NOTES
[place] EMERGENCY DEPARTMENT ([facility])    9/30/24       ED PROVIDER NOTE       History     Chief Complaint   Patient presents with    Abdominal Pain    Back Pain    Nausea, Vomiting, & Diarrhea     HPI  Marjorie Wolf is a 49 year old adult with a history of basal call carcinoma, SVT, asthma, hashimoto's thyroiditis, who presents to the ED with abdominal pain, back pain, nausea, and vomiting.  Patient complains of right sided back/flank pain for the past 2 weeks.  Patient states that she does have a history of an L4-L5 herniated disc but did not think much of it however did note the pain was a little different.  Patient reports that tonight around 10 PM she was getting into bed when suddenly she had severe sharp stabbing pain in her right flank with some radiation down into her abdomen/right lower quadrant/groin region.  Patient reports severe pain, nausea, vomiting.  Patient did take a dose of Tylenol around 10:15 PM.  Patient came in for further evaluation.  Patient denies any fever, chills, chest pain, shortness of breath, dysuria, hematuria, denies any vaginal bleeding or vaginal discharge.  Denies any other abdominal pain.  No other complaints.        Past Medical History  Past Medical History:   Diagnosis Date    Allergic rhinitis due to other allergen     Arthritis     BCC (basal cell carcinoma), face 9/28/2022    Insomnia     benadryl nightly (50mg), se's on trazodone    Mild intermittent asthma     uses maxair 2-3x/yr    Personal history of other diseases of circulatory system 1995    Virally induced stroke- high fever, resolved completely    SVT (supraventricular tachycardia) (H)     Thyroid disease May 2014    Hypothyroidism     Past Surgical History:   Procedure Laterality Date    COLONOSCOPY N/A 1/5/2023    Procedure: COLONOSCOPY, WITH POLYPECTOMY;  Surgeon: Vesta Conner MD;  Location: [facility] OR    ENT SURGERY  1990    2 surgeries for deviated septum    SURGICAL HISTORY OF -   2000     wisdom teeth extraction    [facility] APPENDECTOMY  4/1989    [facility] NONSPECIFIC PROCEDURE  6/1990    Nasal surgery for deviated septum    [facility] NONSPECIFIC PROCEDURE  11/1990    Nasal Surgery for deviated septum     amitriptyline (ELAVIL) 25 MG tablet  estradiol (VAGIFEM) 10 MCG TABS vaginal tablet  Lactase (LACTAID PO)  levalbuterol (XOPENEX HFA) 45 MCG/ACT inhaler  meloxicam (MOBIC) 15 MG tablet  polyethylene glycol (MIRALAX) 17 GM/Dose powder  tiZANidine (ZANAFLEX) 4 MG tablet  UNKNOWN MED DOSAGE  venlafaxine (EFFEXOR XR) 75 MG 24 hr capsule  VITAMIN D, CHOLECALCIFEROL, PO      Allergies   Allergen Reactions    Erythromycin Rash    Penicillins Rash    Sulfa Antibiotics Rash     Family History  Family History   Problem Relation Age of Onset    Diabetes Mother         Type 2    Arthritis Mother     Depression Mother         anxiety/panic attacks as well    Obesity Mother     Thyroid Disease Mother         dx ~35yrs    Hypertension Mother     Hyperlipidemia Mother     Skin Cancer Mother     Other Cancer Mother         skin cancer - basal and squamous    C.A.D. Father         MI at age 54    Gastrointestinal Disease Father         Ulcer H-Pylori?    Lipids Father     Hypertension Father     Hyperlipidemia Father     Genetic Disorder Father     Arthritis Maternal Grandmother     C.A.D. Maternal Grandfather         Artherosclerosis- cause of death, unsure if previous MI    Diabetes Maternal Grandfather         Type 2    Eye Disorder Maternal Grandfather         Glaucoma    Arthritis Sister     Depression Sister     Neurologic Disorder Sister         Migraines    Lipids Sister     Cerebrovascular Disease No family hx of     Breast Cancer No family hx of         Mother had biopsy 2016, found non-malignant    Cancer - colorectal No family hx of      Social History   Social History     Tobacco Use    Smoking status: Never    Smokeless tobacco: Never   Substance Use Topics    Alcohol use: Yes     Comment: Occas.    Drug use: No     "  Past medical history, past surgical history, medications, allergies, family history, and social history were reviewed with the patient. No additional pertinent items.   A medically appropriate review of systems was performed with pertinent positives and negatives noted in the HPI, and all other systems negative.    Physical Exam   BP: 105/66  Pulse: 65  Temp: 97.8  F (36.6  C)  Resp: 18  Height: 180.3 cm (5' 11\")  Weight: 87.1 kg (192 lb)  SpO2: 100 %  Physical Exam  General: Afebrile, moderate distress  HEENT: Normocephalic, atraumatic, conjunctivae normal. MMM  Neck: non-tender, supple  Cardio: regular rate. regular rhythm   Resp: Normal work of breathing, no respiratory distress, lungs clear bilaterally, no wheezing, rhonchi, rales  Chest/Back: no visual signs of trauma, +right flank pain  Abdomen: soft, non distension, no tenderness, no peritoneal signs   Neuro: alert and fully oriented. CN II-XII grossly intact. Grossly normal strength and sensation in all extremities.   MSK: no deformities. Normal range of motion  Integumentary/Skin: no rash visualized, normal color  Psych: normal affect, normal behavior     ED Course, Procedures, & Data      Procedures    Results for orders placed or performed during the hospital encounter of 09/30/24   Abd/pelvis CT no contrast - [facility] Protocol     Status: None    Narrative    EXAM: CT ABDOMEN PELVIS W/O CONTRAST  LOCATION: [facility]  DATE: 9/30/2024    INDICATION: Right flank pain, right lower abdominal pain.  COMPARISON: None.  TECHNIQUE: CT scan of the abdomen and pelvis was performed without IV contrast. Multiplanar reformats were obtained. Dose reduction techniques were used.  CONTRAST: None.    FINDINGS:   LOWER CHEST: Normal.    HEPATOBILIARY: Diffuse fatty infiltration of the liver. No calcified gallstones or biliary dilatation.    PANCREAS: Normal.    SPLEEN: Normal.    ADRENAL GLANDS: Normal.    KIDNEYS/BLADDER: " Normal.    BOWEL: Bowel is normal in caliber with no evidence for obstruction. Appendix not seen with certainty but no inflammatory changes in the right lower quadrant. Large amount of stool in the colon. No acute bowel findings.    LYMPH NODES: Normal.    VASCULATURE: Normal.    PELVIC ORGANS: Normal.    MUSCULOSKELETAL: Normal.      Impression    IMPRESSION:   1.  No acute abnormalities or CT findings to explain the patient's symptoms.    2.  Specifically, no urinary calculi or hydronephrosis.    3.  Diffuse fatty infiltration of the liver.    4.  Large amount of stool in the colon. Correlation for any signs of constipation. No obstruction.    5.  Appendix not visualized with certainty but no inflammatory changes in the right lower quadrant.     US Pelvic Limited     Status: None (Preliminary result)    Impression    RESIDENT PRELIMINARY INTERPRETATION  IMPRESSION:   Limited transabdominal pelvic ultrasound demonstrates a grossly normal  uterus. Neither ovary was identified on ultrasound.   [place] Draw     Status: None    Narrative    The following orders were created for panel order [place] Draw.  Procedure                               Abnormality         Status                     ---------                               -----------         ------                     Extra Blue Top Tube[[id number]]                              Final result               Extra Red Top Tube[[id number]]                               Final result               Extra Green Top (Lithium...[[id number]]                      Final result               Extra Purple Top Tube[[id number]]                            Final result                 Please view results for these tests on the individual orders.   Extra Blue Top Tube     Status: None   Result Value Ref Range    Hold Specimen [facility]    Extra Red Top Tube     Status: None   Result Value Ref Range    Hold Specimen [facility]    Extra Green Top (Lithium Heparin) Tube     Status: None   Result Value  "Ref Range    Hold Specimen [facility]    Extra Purple Top Tube     Status: None   Result Value Ref Range    Hold Specimen JI    Basic metabolic panel     Status: Abnormal   Result Value Ref Range    Sodium 140 135 - 145 mmol/L    Potassium 4.1 3.4 - 5.3 mmol/L    Chloride 105 98 - 107 mmol/L    Carbon Dioxide (CO2) 22 22 - 29 mmol/L    Anion Gap 13 7 - 15 mmol/L    Urea Nitrogen 10.9 6.0 - 20.0 mg/dL    Creatinine 0.73 0.51 - 1.17 mg/dL    GFR Estimate >90 >60 mL/min/1.73m2    Calcium 9.0 8.8 - 10.4 mg/dL    Glucose 115 (H) 70 - 99 mg/dL    Narrative    The generation of reference intervals for this test is currently based on binary male or female sex. If the electronic health record information indicates another gender identity or if Legal Sex is recorded as \"Unknown\", both male and female reference intervals are provided where applicable, and should be considered according to the individual's appropriate clinical context.   CBC with platelets and differential     Status: None   Result Value Ref Range    WBC Count 6.9 4.0 - 11.0 10e3/uL    RBC Count 4.33 3.80 - 5.90 10e6/uL    Hemoglobin 12.9 11.7 - 17.7 g/dL    Hematocrit 40.3 35.0 - 53.0 %    MCV 93 78 - 100 fL    MCH 29.8 26.5 - 33.0 pg    MCHC 32.0 31.5 - 36.5 g/dL    RDW 13.3 10.0 - 15.0 %    Platelet Count 189 150 - 450 10e3/uL    % Neutrophils 65 %    % Lymphocytes 25 %    % Monocytes 6 %    % Eosinophils 3 %    % Basophils 1 %    % Immature Granulocytes 1 %    NRBCs per 100 WBC 0 <1 /100    Absolute Neutrophils 4.5 1.6 - 8.3 10e3/uL    Absolute Lymphocytes 1.7 0.8 - 5.3 10e3/uL    Absolute Monocytes 0.4 0.0 - 1.3 10e3/uL    Absolute Eosinophils 0.2 0.0 - 0.7 10e3/uL    Absolute Basophils 0.1 0.0 - 0.2 10e3/uL    Absolute Immature Granulocytes 0.1 <=0.4 10e3/uL    Absolute NRBCs 0.0 10e3/uL    Narrative    The generation of reference intervals for this test is currently based on binary male or female sex. If the electronic health record information indicates " "another gender identity or if Legal Sex is recorded as \"Unknown\", both male and female reference intervals are provided where applicable, and should be considered according to the individual's appropriate clinical context.   Lipase     Status: Normal   Result Value Ref Range    Lipase 48 13 - 60 U/L   UA with Microscopic reflex to Culture     Status: Abnormal    Specimen: Urine, Midstream   Result Value Ref Range    Color Urine Orange (A) Colorless, Straw, Light Yellow, Yellow    Appearance Urine Cloudy (A) Clear    Glucose Urine Negative Negative mg/dL    Bilirubin Urine Negative Negative    Ketones Urine Negative Negative mg/dL    Specific Gravity Urine 1.018 1.003 - 1.035    Blood Urine Negative Negative    pH Urine 7.5 (H) 5.0 - 7.0    Protein Albumin Urine 10 (A) Negative mg/dL    Urobilinogen Urine Normal Normal, 2.0 mg/dL    Nitrite Urine Negative Negative    Leukocyte Esterase Urine Negative Negative    Bacteria Urine Few (A) None Seen /HPF    WBC Clumps Urine None Seen None Seen /HPF    Budding Yeast Urine None Seen None Seen /HPF    Hyphal Yeast Urine None Seen None Seen /HPF    Mucus Urine Present (A) None Seen /LPF    Amorphous Crystals Urine Many (A) None Seen /HPF    Calcium Oxalate Crystals Urine None Seen None Seen /HPF    Calcium Carbonate Crystals Urine None Seen None Seen /HPF    Calcium Phosphate Crystals Urine None Seen None Seen /HPF    Triple Phosphate Crystals Urine None Seen None Seen /HPF    Uric Acid Crystals Urine None Seen None Seen /HPF    Cystine Crystals Urine None Seen None Seen /HPF    Leucine Crystals Urine None Seen None Seen /HPF    Tyrosine Crystals Urine None Seen None Seen /HPF    Trichomonas Urine None Seen None Seen /HPF    Sperm Urine None Seen None Seen /HPF    Oval Fat Bodies Urine None Seen None Seen /HPF    Other Crystals Urine None Seen None Seen /HPF    RBC Urine 0 <=2 /HPF    WBC Urine 3 <=5 /HPF    Squamous Epithelials Urine 2 (H) <=1 /HPF    Transitional " "Epithelials Urine 0 <=1 /HPF    Renal Tubular Epithelials Urine 0 None Seen /HPF    Hyaline Casts Urine 0 <=2 /LPF    Epithelial Casts Urine 0 None Seen /LPF    Waxy Cast Urine 0 None Seen /LPF    RBC Casts Urine 0 None Seen /LPF    WBC Casts Urine 0 None Seen /LPF    Cellular Casts Urine 0 None Seen /LPF    Fatty Casts Urine 0 None Seen /LPF    Renal Tubular Fat Casts Urine 0 None Seen /LPF    Granular Casts Urine 0 None Seen /LPF    Narrative    Urine Culture not indicated   HCG qualitative Blood     Status: Normal   Result Value Ref Range    hCG Serum Qualitative Negative Negative   Hepatic panel     Status: Abnormal   Result Value Ref Range    Protein Total 6.7 6.4 - 8.3 g/dL    Albumin 4.0 3.5 - 5.2 g/dL    Bilirubin Total 0.2 <=1.2 mg/dL    Alkaline Phosphatase 104 40 - 150 U/L    AST 47 (H) 0 - 45 U/L    ALT 47 0 - 70 U/L    Bilirubin Direct <0.20 0.00 - 0.30 mg/dL    Narrative    The generation of reference intervals for this test is currently based on binary male or female sex. If the electronic health record information indicates another gender identity or if Legal Sex is recorded as \"Unknown\", both male and female reference intervals are provided where applicable, and should be considered according to the individual's appropriate clinical context.   HCG qualitative urine     Status: Normal   Result Value Ref Range    hCG Urine Qualitative Negative Negative   CBC with platelets differential     Status: None    Narrative    The following orders were created for panel order CBC with platelets differential.  Procedure                               Abnormality         Status                     ---------                               -----------         ------                     CBC with platelets and d...[[id number]]                      Final result                 Please view results for these tests on the individual orders.     Medications   ketorolac (TORADOL) injection 15 mg (15 mg Intravenous $Given " 9/30/24 0425)   sodium chloride 0.9% BOLUS 1,000 mL (0 mLs Intravenous Stopped 9/30/24 6899)     Labs Ordered and Resulted from Time of ED Arrival to Time of ED Departure   BASIC METABOLIC PANEL - Abnormal       Result Value    Sodium 140      Potassium 4.1      Chloride 105      Carbon Dioxide (CO2) 22      Anion Gap 13      Urea Nitrogen 10.9      Creatinine 0.73      GFR Estimate >90      Calcium 9.0      Glucose 115 (*)    ROUTINE UA WITH MICROSCOPIC REFLEX TO CULTURE - Abnormal    Color Urine Orange (*)     Appearance Urine Cloudy (*)     Glucose Urine Negative      Bilirubin Urine Negative      Ketones Urine Negative      Specific Gravity Urine 1.018      Blood Urine Negative      pH Urine 7.5 (*)     Protein Albumin Urine 10 (*)     Urobilinogen Urine Normal      Nitrite Urine Negative      Leukocyte Esterase Urine Negative      Bacteria Urine Few (*)     WBC Clumps Urine None Seen      Budding Yeast Urine None Seen      Hyphal Yeast Urine None Seen      Mucus Urine Present (*)     Amorphous Crystals Urine Many (*)     Calcium Oxalate Crystals Urine None Seen      Calcium Carbonate Crystals Urine None Seen      Calcium Phosphate Crystals Urine None Seen      Triple Phosphate Crystals Urine None Seen      Uric Acid Crystals Urine None Seen      Cystine Crystals Urine None Seen      Leucine Crystals Urine None Seen      Tyrosine Crystals Urine None Seen      Trichomonas Urine None Seen      Sperm Urine None Seen      Oval Fat Bodies Urine None Seen      Other Crystals Urine None Seen      RBC Urine 0      WBC Urine 3      Squamous Epithelials Urine 2 (*)     Transitional Epithelials Urine 0      Renal Tubular Epithelials Urine 0      Hyaline Casts Urine 0      Epithelial Casts Urine 0      Waxy Cast Urine 0      RBC Casts Urine 0      WBC Casts Urine 0      Cellular Casts Urine 0      Fatty Casts Urine 0      Renal Tubular Fat Casts Urine 0      Granular Casts Urine 0     HEPATIC FUNCTION PANEL - Abnormal     Protein Total 6.7      Albumin 4.0      Bilirubin Total 0.2      Alkaline Phosphatase 104      AST 47 (*)     ALT 47      Bilirubin Direct <0.20     LIPASE - Normal    Lipase 48     HCG QUALITATIVE PREGNANCY - Normal    hCG Serum Qualitative Negative     HCG QUALITATIVE URINE - Normal    hCG Urine Qualitative Negative     CBC WITH PLATELETS AND DIFFERENTIAL    WBC Count 6.9      RBC Count 4.33      Hemoglobin 12.9      Hematocrit 40.3      MCV 93      MCH 29.8      MCHC 32.0      RDW 13.3      Platelet Count 189      % Neutrophils 65      % Lymphocytes 25      % Monocytes 6      % Eosinophils 3      % Basophils 1      % Immature Granulocytes 1      NRBCs per 100 WBC 0      Absolute Neutrophils 4.5      Absolute Lymphocytes 1.7      Absolute Monocytes 0.4      Absolute Eosinophils 0.2      Absolute Basophils 0.1      Absolute Immature Granulocytes 0.1      Absolute NRBCs 0.0       US Pelvic Limited   Preliminary Result   RESIDENT PRELIMINARY INTERPRETATION   IMPRESSION:    Limited transabdominal pelvic ultrasound demonstrates a grossly normal   uterus. Neither ovary was identified on ultrasound.      Abd/pelvis CT no contrast - [facility] Protocol   Final Result   IMPRESSION:    1.  No acute abnormalities or CT findings to explain the patient's symptoms.      2.  Specifically, no urinary calculi or hydronephrosis.      3.  Diffuse fatty infiltration of the liver.      4.  Large amount of stool in the colon. Correlation for any signs of constipation. No obstruction.      5.  Appendix not visualized with certainty but no inflammatory changes in the right lower quadrant.                Critical care was not performed.     Medical Decision Making  The patient's presentation was of high complexity (an acute health issue posing potential threat to life or bodily function).    The patient's evaluation involved:  an assessment requiring an independent historian (EMS)  ordering and/or review of 3+ test(s) in this encounter (see  separate area of note for details)  independent interpretation of testing performed by another health professional (CT, ultrasound)    The patient's management necessitated moderate risk (prescription drug management including medications given in the ED), high risk (a parenteral controlled substance), and high risk (a decision regarding hospitalization).    Assessment & Plan    Marjorie Wolf is a 49 year old adult with a history of basal call carcinoma, SVT, asthma, hashimoto's thyroiditis, who presents to the ED with abdominal pain, back pain, nausea, and vomiting.  Upon arrival patient is nontoxic-appearing, afebrile, moderate distress secondary to pain.  Patient hemodynamically stable vital signs with normal limits.  Differential diagnosis includes but is not limited to musculoskeletal versus kidney stone versus pyelonephritis versus cystitis versus ovarian cyst among others.  Patient reports history of appendectomy in the past.  Patient was treated with Zofran, fentanyl, and 500 cc IV fluid bolus prior to arrival.  Upon arrival patient patient was treated with Toradol, 1 L IV fluid bolus.  Patient's labs unremarkable with white blood cell count of 6.9, hemoglobin 12.9, no acute metabolic or electrolyte abnormality, normal lipase.    I Personally reviewed and interpreted CT scan which demonstrates no acute abnormalities, no evidence of urinary calculi or hydronephrosis.  Patient does have large amounts of stool in the colon however patient states she does take MiraLAX daily and typically has normal bowel movements.  Patient reports history of appendectomy in the past.  Discussed results with patient, and decision was made to further evaluate patient symptoms with ultrasound.  I personally reviewed and interpreted pelvic ultrasound which demonstrates grossly normal uterus.  Unable to completely visualize ovaries on ultrasound.  On reevaluation patient resting comfortably, reports some improvement of her  symptoms.  I discussed results with patient.  Patient feels comfortable discharge home.  Plan for discharge home with continued supportive care, close outpatient follow-up with her primary care provider.  Patient states she has an appointment in 2 days on Wednesday.  Strict return precautions discussed.  Patient understands and agrees the plan.    I have reviewed the nursing notes. I have reviewed the findings, diagnosis, plan and need for follow up with the patient.    New Prescriptions    No medications on file       Final diagnoses:   Right flank pain   Right sided abdominal pain       Thuy Reed MD  [facility] EMERGENCY DEPARTMENT  9/30/2024     Thuy Reed MD  09/30/24 0502

## 2024-10-01 SDOH — HEALTH STABILITY: PHYSICAL HEALTH: ON AVERAGE, HOW MANY DAYS PER WEEK DO YOU ENGAGE IN MODERATE TO STRENUOUS EXERCISE (LIKE A BRISK WALK)?: 3 DAYS

## 2024-10-01 SDOH — HEALTH STABILITY: PHYSICAL HEALTH: ON AVERAGE, HOW MANY MINUTES DO YOU ENGAGE IN EXERCISE AT THIS LEVEL?: 30 MIN

## 2024-10-01 ASSESSMENT — ASTHMA QUESTIONNAIRES
QUESTION_3 LAST FOUR WEEKS HOW OFTEN DID YOUR ASTHMA SYMPTOMS (WHEEZING, COUGHING, SHORTNESS OF BREATH, CHEST TIGHTNESS OR PAIN) WAKE YOU UP AT NIGHT OR EARLIER THAN USUAL IN THE MORNING: NOT AT ALL
QUESTION_4 LAST FOUR WEEKS HOW OFTEN HAVE YOU USED YOUR RESCUE INHALER OR NEBULIZER MEDICATION (SUCH AS ALBUTEROL): NOT AT ALL
ACT_TOTALSCORE: 25
QUESTION_1 LAST FOUR WEEKS HOW MUCH OF THE TIME DID YOUR ASTHMA KEEP YOU FROM GETTING AS MUCH DONE AT WORK, SCHOOL OR AT HOME: NONE OF THE TIME
QUESTION_5 LAST FOUR WEEKS HOW WOULD YOU RATE YOUR ASTHMA CONTROL: COMPLETELY CONTROLLED
ACT_TOTALSCORE: 25
QUESTION_2 LAST FOUR WEEKS HOW OFTEN HAVE YOU HAD SHORTNESS OF BREATH: NOT AT ALL

## 2024-10-01 ASSESSMENT — SOCIAL DETERMINANTS OF HEALTH (SDOH): HOW OFTEN DO YOU GET TOGETHER WITH FRIENDS OR RELATIVES?: TWICE A WEEK

## 2024-10-02 ENCOUNTER — OFFICE VISIT (OUTPATIENT)
Dept: FAMILY MEDICINE | Facility: CLINIC | Age: 49
End: 2024-10-02
Payer: COMMERCIAL

## 2024-10-02 VITALS
RESPIRATION RATE: 16 BRPM | TEMPERATURE: 97 F | HEART RATE: 72 BPM | BODY MASS INDEX: 26.35 KG/M2 | SYSTOLIC BLOOD PRESSURE: 123 MMHG | HEIGHT: 71 IN | WEIGHT: 188.2 LBS | DIASTOLIC BLOOD PRESSURE: 84 MMHG | OXYGEN SATURATION: 99 %

## 2024-10-02 DIAGNOSIS — N95.2 VAGINAL ATROPHY: ICD-10-CM

## 2024-10-02 DIAGNOSIS — Z00.00 ROUTINE GENERAL MEDICAL EXAMINATION AT A HEALTH CARE FACILITY: Primary | ICD-10-CM

## 2024-10-02 DIAGNOSIS — M51.26 LUMBAR DISC HERNIATION: ICD-10-CM

## 2024-10-02 DIAGNOSIS — R10.11 RUQ ABDOMINAL PAIN: ICD-10-CM

## 2024-10-02 DIAGNOSIS — K80.20 GALLSTONES: ICD-10-CM

## 2024-10-02 DIAGNOSIS — E06.3 HASHIMOTO'S THYROIDITIS: ICD-10-CM

## 2024-10-02 DIAGNOSIS — E78.1 HYPERTRIGLYCERIDEMIA: ICD-10-CM

## 2024-10-02 DIAGNOSIS — N95.1 MENOPAUSAL SYNDROME (HOT FLASHES): ICD-10-CM

## 2024-10-02 DIAGNOSIS — G47.00 INSOMNIA, UNSPECIFIED TYPE: ICD-10-CM

## 2024-10-02 LAB
CHOLEST SERPL-MCNC: 220 MG/DL
HDLC SERPL-MCNC: 51 MG/DL
LDLC SERPL CALC-MCNC: 128 MG/DL
NONHDLC SERPL-MCNC: 169 MG/DL
T3FREE SERPL-MCNC: 2.9 PG/ML (ref 2–4.4)
T4 FREE SERPL-MCNC: 0.82 NG/DL (ref 0.9–1.7)
TRIGL SERPL-MCNC: 205 MG/DL
TSH SERPL DL<=0.005 MIU/L-ACNC: 12.7 UIU/ML (ref 0.3–4.2)

## 2024-10-02 PROCEDURE — 91320 SARSCV2 VAC 30MCG TRS-SUC IM: CPT | Performed by: FAMILY MEDICINE

## 2024-10-02 PROCEDURE — 90656 IIV3 VACC NO PRSV 0.5 ML IM: CPT | Performed by: FAMILY MEDICINE

## 2024-10-02 PROCEDURE — 90480 ADMN SARSCOV2 VAC 1/ONLY CMP: CPT | Performed by: FAMILY MEDICINE

## 2024-10-02 PROCEDURE — 99396 PREV VISIT EST AGE 40-64: CPT | Mod: 57 | Performed by: FAMILY MEDICINE

## 2024-10-02 PROCEDURE — 90471 IMMUNIZATION ADMIN: CPT | Performed by: FAMILY MEDICINE

## 2024-10-02 PROCEDURE — 99213 OFFICE O/P EST LOW 20 MIN: CPT | Mod: 25 | Performed by: FAMILY MEDICINE

## 2024-10-02 RX ORDER — LEVOTHYROXINE SODIUM 25 UG/1
25 TABLET ORAL DAILY
Qty: 60 TABLET | Refills: 0 | Status: SHIPPED | OUTPATIENT
Start: 2024-10-02

## 2024-10-02 RX ORDER — ESTRADIOL 10 UG/1
10 INSERT VAGINAL
Qty: 24 TABLET | Refills: 3 | Status: SHIPPED | OUTPATIENT
Start: 2024-10-03

## 2024-10-02 RX ORDER — VENLAFAXINE HYDROCHLORIDE 75 MG/1
75 CAPSULE, EXTENDED RELEASE ORAL DAILY
Qty: 90 CAPSULE | Refills: 3 | Status: SHIPPED | OUTPATIENT
Start: 2024-10-02

## 2024-10-02 ASSESSMENT — PAIN SCALES - PAIN ENJOYMENT GENERAL ACTIVITY SCALE (PEG)
PEG_TOTALSCORE: 6.67
AVG_PAIN_PASTWEEK: 8
INTERFERED_GENERAL_ACTIVITY: 6
AVG_PAIN_PASTWEEK: 8
PEG_TOTALSCORE: 6.67
INTERFERED_ENJOYMENT_LIFE: 6
INTERFERED_ENJOYMENT_LIFE: 6
INTERFERED_GENERAL_ACTIVITY: 6

## 2024-10-02 ASSESSMENT — PAIN SCALES - GENERAL: PAINLEVEL: EXTREME PAIN (8)

## 2024-10-02 ASSESSMENT — ANXIETY QUESTIONNAIRES
3. WORRYING TOO MUCH ABOUT DIFFERENT THINGS: NOT AT ALL
IF YOU CHECKED OFF ANY PROBLEMS ON THIS QUESTIONNAIRE, HOW DIFFICULT HAVE THESE PROBLEMS MADE IT FOR YOU TO DO YOUR WORK, TAKE CARE OF THINGS AT HOME, OR GET ALONG WITH OTHER PEOPLE: NOT DIFFICULT AT ALL
1. FEELING NERVOUS, ANXIOUS, OR ON EDGE: SEVERAL DAYS
7. FEELING AFRAID AS IF SOMETHING AWFUL MIGHT HAPPEN: NOT AT ALL
7. FEELING AFRAID AS IF SOMETHING AWFUL MIGHT HAPPEN: NOT AT ALL
GAD7 TOTAL SCORE: 2
6. BECOMING EASILY ANNOYED OR IRRITABLE: NOT AT ALL
8. IF YOU CHECKED OFF ANY PROBLEMS, HOW DIFFICULT HAVE THESE MADE IT FOR YOU TO DO YOUR WORK, TAKE CARE OF THINGS AT HOME, OR GET ALONG WITH OTHER PEOPLE?: NOT DIFFICULT AT ALL
2. NOT BEING ABLE TO STOP OR CONTROL WORRYING: NOT AT ALL
GAD7 TOTAL SCORE: 2
GAD7 TOTAL SCORE: 2
5. BEING SO RESTLESS THAT IT IS HARD TO SIT STILL: NOT AT ALL
4. TROUBLE RELAXING: SEVERAL DAYS

## 2024-10-02 ASSESSMENT — PATIENT HEALTH QUESTIONNAIRE - PHQ9
SUM OF ALL RESPONSES TO PHQ QUESTIONS 1-9: 0
SUM OF ALL RESPONSES TO PHQ QUESTIONS 1-9: 0
10. IF YOU CHECKED OFF ANY PROBLEMS, HOW DIFFICULT HAVE THESE PROBLEMS MADE IT FOR YOU TO DO YOUR WORK, TAKE CARE OF THINGS AT HOME, OR GET ALONG WITH OTHER PEOPLE: NOT DIFFICULT AT ALL

## 2024-10-02 NOTE — NURSING NOTE
Prior to immunization administration, verified patients identity using patient s name and date of birth. Please see Immunization Activity for additional information.     Screening Questionnaire for Adult Immunization    Are you sick today?   No   Do you have allergies to medications, food, a vaccine component or latex?   No   Have you ever had a serious reaction after receiving a vaccination?   No   Do you have a long-term health problem with heart, lung, kidney, or metabolic disease (e.g., diabetes), asthma, a blood disorder, no spleen, complement component deficiency, a cochlear implant, or a spinal fluid leak?  Are you on long-term aspirin therapy?   No   Do you have cancer, leukemia, HIV/AIDS, or any other immune system problem?   No   Do you have a parent, brother, or sister with an immune system problem?   No   In the past 3 months, have you taken medications that affect  your immune system, such as prednisone, other steroids, or anticancer drugs; drugs for the treatment of rheumatoid arthritis, Crohn s disease, or psoriasis; or have you had radiation treatments?   No   Have you had a seizure, or a brain or other nervous system problem?   No   During the past year, have you received a transfusion of blood or blood    products, or been given immune (gamma) globulin or antiviral drug?   No   For women: Are you pregnant or is there a chance you could become       pregnant during the next month?   No   Have you received any vaccinations in the past 4 weeks?   No     Immunization questionnaire answers were all negative.      Patient instructed to remain in clinic for 15 minutes afterwards, and to report any adverse reactions.     Screening performed by Jordan Downs MA on 10/2/2024 at 9:33 AM.

## 2024-10-02 NOTE — PROGRESS NOTES
"[facility] Visit  [facility]  Jeanette Rodrigues DO, Family Medicine  Oct 2, 2024      Assessment & Plan     Routine general medical examination at a health care facility       RUQ abdominal pain  Right abdominal pain associated with vomiting -  Will look for gallstones or gallbladder disease  Pt will call or RTC if symptoms worsen or do not improve.   - US Abdomen Limited; Future  - NM Hepatobiliary Scan with GB EF and/or Pharm; Future    Hypertriglyceridemia  Pending   - Lipid panel reflex to direct LDL Non-fasting; Future    Insomnia, unspecified type  Refilled   - amitriptyline (ELAVIL) 25 MG tablet; TAKE 3 TABLETS BY MOUTH NIGHTLY AS NEEDED FOR SLEEP    Menopausal syndrome (hot flashes)     - venlafaxine (EFFEXOR XR) 75 MG 24 hr capsule; Take 1 capsule (75 mg) by mouth daily.    Lumbar disc herniation  Meeting with ortho end of month   - tiZANidine (ZANAFLEX) 4 MG tablet; Take 1 tablet (4 mg) by mouth nightly as needed for muscle spasms.    Vaginal atrophy   Refilled   - estradiol (VAGIFEM) 10 MCG TABS vaginal tablet; Place 1 tablet (10 mcg) vaginally twice a week.    Hashimoto's thyroiditis  Labs pending   - TSH; Future  - T3, Free; Future  - T4, free; Future  - Thyroid peroxidase antibody; Future    Patient has been advised of split billing requirements and indicates understanding: Yes        BMI  Estimated body mass index is 26.25 kg/m  as calculated from the following:    Height as of this encounter: 1.803 m (5' 11\").    Weight as of this encounter: 85.4 kg (188 lb 3.2 oz).       Counseling  Appropriate preventive services were addressed with this patient via screening, questionnaire, or discussion as appropriate for fall prevention, nutrition, physical activity, Tobacco-use cessation, social engagement, weight loss and cognition.  Checklist reviewing preventive services available has been given to the patient.  Reviewed patient's diet, addressing concerns and/or questions.   Marjorie" is at risk for lack of exercise and has been provided with information to increase physical activity for the benefit of Marjorie's well-being.           Subjective   Marjorie is a 49 year old, presenting for the following:  Physical        10/2/2024     8:43 AM   Additional Questions   Roomed by Tami        Health Care Directive  Patient has a Health Care Directive on file  Advance care planning document is on file and is current.    HPI  Answers submitted by the patient for this visit:  Patient Health Questionnaire (Submitted on 10/2/2024)  If you checked off any problems, how difficult have these problems made it for you to do your work, take care of things at home, or get along with other people?: Not difficult at all  PHQ9 TOTAL SCORE: 0  Patient Health Questionnaire (G7) (Submitted on 10/2/2024)  EVA 7 TOTAL SCORE: 2      2.5 weeks -   Started having back pain   Sunday night pain radiated around abdomen -   Vomited due to severity of pain   Yesterday still had abd symptoms - tried enema - not much came out       Has appt with ortho and they will see at end of month              10/1/2024   General Health   How would you rate your overall physical health? Good   Feel stress (tense, anxious, or unable to sleep) Not at all            10/1/2024   Nutrition   Three or more servings of calcium each day? Yes   Diet: Vegetarian/vegan   How many servings of fruit and vegetables per day? 4 or more   How many sweetened beverages each day? 0-1            10/1/2024   Exercise   Days per week of moderate/strenous exercise 3 days   Average minutes spent exercising at this level 30 min            10/1/2024   Social Factors   Frequency of gathering with friends or relatives Twice a week   Worry food won't last until get money to buy more No   Food not last or not have enough money for food? No   Do you have housing? (Housing is defined as stable permanent housing and does not include staying ouside in a car, in a tent, in an  abandoned building, in an overnight shelter, or couch-surfing.) Yes   Are you worried about losing your housing? No   Lack of transportation? No   Unable to get utilities (heat,electricity)? No            10/1/2024   Dental   Dentist two times every year? Yes            10/1/2024   TB Screening   Were you born outside of the [place]? No          Today's PHQ-9 Score:       10/2/2024     8:37 AM   PHQ-9 SCORE   PHQ-9 Total Score MyChart 0   PHQ-9 Total Score 0         10/1/2024   Substance Use   Alcohol more than 3/day or more than 7/wk No   Do you use any other substances recreationally? No        Social History     Tobacco Use    Smoking status: Never    Smokeless tobacco: Never   Substance Use Topics    Alcohol use: Yes     Comment: Occas.    Drug use: No           10/27/2023   LAST FHS-7 RESULTS   1st degree relative breast or ovarian cancer No   Any relative bilateral breast cancer No   Any male have breast cancer No   Any ONE woman have BOTH breast AND ovarian cancer No   Any woman with breast cancer before 50yrs No   2 or more relatives with breast AND/OR ovarian cancer No   2 or more relatives with breast AND/OR bowel cancer No           Mammogram Screening - Mammogram every 1-2 years updated in Health Maintenance based on mutual decision making        10/1/2024   STI Screening   New sexual partner(s) since last STI/HIV test? No        History of abnormal Pap smear: No - age 30- 64 PAP with HPV every 5 years recommended        Latest Ref Rng & Units 9/28/2022    11:23 AM 6/20/2018    10:28 AM 6/20/2018     9:03 AM   PAP / HPV   PAP  Negative for Intraepithelial Lesion or Malignancy (NILM)      PAP (Historical)    NIL    HPV 16 DNA Negative Negative  Negative     HPV 18 DNA Negative Negative  Negative     Other HR HPV Negative Negative  Negative       ASCVD Risk   The 10-year ASCVD risk score (Kasey REEDER, et al., 2019) is: 1.1%    Values used to calculate the score:      Age: 49 years      Sex: Female       Is Non- : No      Diabetic: No      Tobacco smoker: No      Systolic Blood Pressure: 123 mmHg      Is BP treated: No      HDL Cholesterol: 62 mg/dL      Total Cholesterol: 229 mg/dL       Reviewed and updated as needed this visit by Provider   Tobacco  Allergies  Meds  Problems  Med Hx  Surg Hx  Fam Hx            Patient Active Problem List   Diagnosis    Allergic rhinitis due to pollen    Family history of other cardiovascular diseases    Low back pain    Hypertriglyceridemia    CARDIOVASCULAR SCREENING; LDL GOAL LESS THAN 160    Intermittent asthma    Insomnia    SVT (supraventricular tachycardia) (H)    Family history of hemochromatosis    Hashimoto's thyroiditis    Thyroid enlargement    Thyroid nodule    ACP (advance care planning)    BCC (basal cell carcinoma), face     Past Surgical History:   Procedure Laterality Date    COLONOSCOPY N/A 1/5/2023    Procedure: COLONOSCOPY, WITH POLYPECTOMY;  Surgeon: Vesta Conner MD;  Location: [facility] OR    ENT SURGERY  1990    2 surgeries for deviated septum    SURGICAL HISTORY OF -   2000    wisdom teeth extraction    ZZC APPENDECTOMY  4/1989    ZZC NONSPECIFIC PROCEDURE  6/1990    Nasal surgery for deviated septum    ZZC NONSPECIFIC PROCEDURE  11/1990    Nasal Surgery for deviated septum       Social History     Tobacco Use    Smoking status: Never    Smokeless tobacco: Never   Substance Use Topics    Alcohol use: Yes     Comment: Occas.     Family History   Problem Relation Age of Onset    Diabetes Mother         Type 2    Arthritis Mother     Depression Mother         anxiety/panic attacks as well    Obesity Mother     Thyroid Disease Mother         dx ~35yrs    Hypertension Mother     Hyperlipidemia Mother     Skin Cancer Mother     Other Cancer Mother         skin cancer - basal and squamous    C.A.D. Father         MI at age 54    Gastrointestinal Disease Father         Ulcer H-Pylori?    Lipids Father     Hypertension Father      "Hyperlipidemia Father     Genetic Disorder Father     Arthritis Maternal Grandmother     C.A.D. Maternal Grandfather         Artherosclerosis- cause of death, unsure if previous MI    Diabetes Maternal Grandfather         Type 2    Eye Disorder Maternal Grandfather         Glaucoma    Arthritis Sister     Depression Sister     Neurologic Disorder Sister         Migraines    Lipids Sister     Cerebrovascular Disease No family hx of     Breast Cancer No family hx of         Mother had biopsy 2016, found non-malignant    Cancer - colorectal No family hx of              Review of Systems  Constitutional, HEENT, cardiovascular, pulmonary, GI, , musculoskeletal, neuro, skin, endocrine and psych systems are negative, except as otherwise noted.     Objective    Exam  /84 (BP Location: Left arm, Patient Position: Sitting, Cuff Size: Adult Large)   Pulse 72   Temp 97  F (36.1  C) (Skin)   Resp 16   Ht 1.803 m (5' 11\")   Wt 85.4 kg (188 lb 3.2 oz)   LMP 07/05/2020 (Exact Date)   SpO2 99%   Breastfeeding No   BMI 26.25 kg/m     Estimated body mass index is 26.25 kg/m  as calculated from the following:    Height as of this encounter: 1.803 m (5' 11\").    Weight as of this encounter: 85.4 kg (188 lb 3.2 oz).    Physical Exam  GENERAL: alert and no distress  EYES: Eyes grossly normal to inspection, PERRL and conjunctivae and sclerae normal  HENT: ear canals and TM's normal, nose and mouth without ulcers or lesions  NECK: no adenopathy, no asymmetry, masses, or scars  RESP: lungs clear to auscultation - no rales, rhonchi or wheezes  BREAST: normal without masses, tenderness or nipple discharge and no palpable axillary masses or adenopathy  CV: regular rate and rhythm, normal S1 S2, no S3 or S4, no murmur, click or rub, no peripheral edema  ABDOMEN: soft, nontender, no hepatosplenomegaly, no masses and bowel sounds normal  MS: no gross musculoskeletal defects noted, no edema  SKIN: no suspicious lesions or " trey  NEURO: Normal strength and tone, mentation intact and speech normal  PSYCH: mentation appears normal, affect normal/bright        Signed Electronically by: Jeanette Rodrigues, DO

## 2024-10-02 NOTE — PATIENT INSTRUCTIONS
Patient Education   Preventive Care Advice   This is general advice given by our system to help you stay healthy. However, your care team may have specific advice just for you. Please talk to your care team about your preventive care needs.  Nutrition  Eat 5 or more servings of fruits and vegetables each day.  Try wheat bread, brown rice and whole grain pasta (instead of white bread, rice, and pasta).  Get enough calcium and vitamin D. Check the label on foods and aim for 100% of the RDA (recommended daily allowance).  Lifestyle  Exercise at least 150 minutes each week  (30 minutes a day, 5 days a week).  Do muscle strengthening activities 2 days a week. These help control your weight and prevent disease.  No smoking.  Wear sunscreen to prevent skin cancer.  Have a dental exam and cleaning every 6 months.  Yearly exams  See your health care team every year to talk about:  Any changes in your health.  Any medicines your care team has prescribed.  Preventive care, family planning, and ways to prevent chronic diseases.  Shots (vaccines)   HPV shots (up to age 26), if you've never had them before.  Hepatitis B shots (up to age 59), if you've never had them before.  COVID-19 shot: Get this shot when it's due.  Flu shot: Get a flu shot every year.  Tetanus shot: Get a tetanus shot every 10 years.  Pneumococcal, hepatitis A, and RSV shots: Ask your care team if you need these based on your risk.  Shingles shot (for age 50 and up)  General health tests  Diabetes screening:  Starting at age 35, Get screened for diabetes at least every 3 years.  If you are younger than age 35, ask your care team if you should be screened for diabetes.  Cholesterol test: At age 39, start having a cholesterol test every 5 years, or more often if advised.  Bone density scan (DEXA): At age 50, ask your care team if you should have this scan for osteoporosis (brittle bones).  Hepatitis C: Get tested at least once in your life.  STIs (sexually  transmitted infections)  Before age 24: Ask your care team if you should be screened for STIs.  After age 24: Get screened for STIs if you're at risk. You are at risk for STIs (including HIV) if:  You are sexually active with more than one person.  You don't use condoms every time.  You or a partner was diagnosed with a sexually transmitted infection.  If you are at risk for HIV, ask about PrEP medicine to prevent HIV.  Get tested for HIV at least once in your life, whether you are at risk for HIV or not.  Cancer screening tests  Cervical cancer screening: If you have a cervix, begin getting regular cervical cancer screening tests starting at age 21.  Breast cancer scan (mammogram): If you've ever had breasts, begin having regular mammograms starting at age 40. This is a scan to check for breast cancer.  Colon cancer screening: It is important to start screening for colon cancer at age 45.  Have a colonoscopy test every 10 years (or more often if you're at risk) Or, ask your provider about stool tests like a FIT test every year or Cologuard test every 3 years.  To learn more about your testing options, visit:   .  For help making a decision, visit:   https://bit.ly/vh83477.  Prostate cancer screening test: If you have a prostate, ask your care team if a prostate cancer screening test (PSA) at age 55 is right for you.  Lung cancer screening: If you are a current or former smoker ages 50 to 80, ask your care team if ongoing lung cancer screenings are right for you.  For informational purposes only. Not to replace the advice of your health care provider. Copyright   2023 [facility] [employer]. All rights reserved. Clinically reviewed by the [facility]. [employer] [id number] - REV 01/24.    For constipation   Try Magnesium Citrate - take one dose   Repeat in 24-48 hours if no results

## 2024-10-03 LAB — THYROPEROXIDASE AB SERPL-ACNC: 37 IU/ML

## 2024-10-04 ENCOUNTER — MYC MEDICAL ADVICE (OUTPATIENT)
Dept: FAMILY MEDICINE | Facility: CLINIC | Age: 49
End: 2024-10-04

## 2024-10-04 ENCOUNTER — ANCILLARY PROCEDURE (OUTPATIENT)
Dept: ULTRASOUND IMAGING | Facility: CLINIC | Age: 49
End: 2024-10-04
Attending: FAMILY MEDICINE
Payer: COMMERCIAL

## 2024-10-04 DIAGNOSIS — R10.11 RUQ ABDOMINAL PAIN: ICD-10-CM

## 2024-10-04 PROCEDURE — 76705 ECHO EXAM OF ABDOMEN: CPT | Mod: GC | Performed by: RADIOLOGY

## 2024-10-04 NOTE — RESULT ENCOUNTER NOTE
Dear Marjorie,   Your test results are all back -   Your ultrasound does show gallstones -   I think we may want to have you see surgeon - I will place a referral and someone will call to schedule.    Let us know if you have any questions.  -Jeaentte Rodrigues, DO

## 2024-10-07 ENCOUNTER — OFFICE VISIT (OUTPATIENT)
Dept: SURGERY | Facility: CLINIC | Age: 49
End: 2024-10-07
Attending: FAMILY MEDICINE
Payer: COMMERCIAL

## 2024-10-07 VITALS
DIASTOLIC BLOOD PRESSURE: 70 MMHG | HEIGHT: 71 IN | SYSTOLIC BLOOD PRESSURE: 130 MMHG | BODY MASS INDEX: 26.32 KG/M2 | HEART RATE: 83 BPM | OXYGEN SATURATION: 98 % | WEIGHT: 188 LBS

## 2024-10-07 DIAGNOSIS — R10.11 RUQ ABDOMINAL PAIN: ICD-10-CM

## 2024-10-07 DIAGNOSIS — K80.20 GALLSTONES: ICD-10-CM

## 2024-10-07 PROCEDURE — 99244 OFF/OP CNSLTJ NEW/EST MOD 40: CPT | Performed by: SURGERY

## 2024-10-07 NOTE — LETTER
November 7, 2024          Jeanette Rodrigues DO  [address]  [place] [address]      RE:   Marjorie Wolf 1975      Dear Colleague,    Thank you for referring your patient, Marjorie Wolf, to [facility]. Please see a copy of my visit note below.     Patient is a 49-year-old female referred by the above-mentioned provider for consultation regarding gallbladder disease.  She has been having intermittent episodes of right flank and right-sided abdominal pain.  She had a visit to the emergency department related to this back in September.  She said no fevers or chills.  Mild nausea associated with this.  Seems to be associated with meals.  She has been evaluated and found to have cholelithiasis.  No tea colored urine or pale stools.     PMH:   has a past medical history of Allergic rhinitis due to other allergen, Arthritis, BCC (basal cell carcinoma), face (9/28/2022), Insomnia, Mild intermittent asthma, Personal history of other diseases of circulatory system (1995), SVT (supraventricular tachycardia) (H), and Thyroid disease (May 2014).  PSH:    has a past surgical history that includes APPENDECTOMY (4/1989); NONSPECIFIC PROCEDURE (6/1990); NONSPECIFIC PROCEDURE (11/1990); surgical history of -  (2000); ENT surgery (1990); Colonoscopy (N/A, 1/5/2023); and Laparoscopic cholecystectomy (N/A, 11/1/2024).  Social History:   reports that Marjorie has never smoked. Marjorie has never used smokeless tobacco. Marjorie reports current alcohol use. Marjorie reports that Marjorie does not use drugs.  Family History:  family history includes Arthritis in Marjorie's maternal grandmother, mother, and sister; C.A.D. in Marjorie's father and maternal grandfather; Depression in Marjorie's mother and sister; Diabetes in Marjorie's maternal grandfather and mother; Eye Disorder in Marjorie's maternal grandfather; Gastrointestinal Disease in Marjorie's father; Genetic  "Disorder in Marjorie's father; Hyperlipidemia in Marjorie's father and mother; Hypertension in Marjorie's father and mother; Lipids in Marjorie's father and sister; Neurologic Disorder in Marjorie's sister; Obesity in Marjorie's mother; Other Cancer in Marjorie's mother; Skin Cancer in Marjorie's mother; Thyroid Disease in Marjorie's mother.  Medications/Allergies: Home medications and allergies reviewed.     ROS:  The 10 point Review of Systems is negative other than noted in the HPI.     Physical Exam:  /70   Pulse 83   Ht 1.803 m (5' 11\")   Wt 85.3 kg (188 lb)   LMP 07/05/2020 (Exact Date)   SpO2 98%   BMI 26.22 kg/m    GENERAL: Generally appears well.  Psych: Alert and Oriented.  Normal affect  Eyes: Sclera clear  Respiratory:  Lungs clear to ausculation bilaterally with good air excursion  Cardiovascular:  Regular Rate and Rhythm with no murmurs gallops or rubs, normal peripheral pulses  GI: Abdomen Non Distended Soft Non-Tender  No hernias palpated.  Lymphatic/Hematologic/Immune:  No femoral or cervical lymphadenopathy.  Integumentary:  No rashes  Neurological: grossly intact     Ultrasound shows Cholelithiasis No gall bladder wall thicking  No pericholecystic fluid Ducts Measures 3MM  All new lab and imaging data was reviewed.      Impression and Plan:  Patient is a 49 year old adult with cholelithiasis.     PLAN:   I discussed the pathophysiology of gallbladder disease and complications of cholecystitis and choledocholithiasis with the patient.  Recommend outpatient laparoscopic cholecystectomy at her convenience I also discussed the risks associated with the procedure including, but not limited to infection, bleeding, conversion to open, bile leak, bile duct injury, retained gallstones, pneumonia, MI, and anesthesia complications with the patient.  I also discussed if a complication did occur it may require further surgical intervention during or after the procedure. The patient indicated " understanding of the discussion, asked appropriate questions, and provided consent. I provided the patient an information pamphlet. I have recommended a low fat diet and instructed the patient to go to ER if they developed persistent pain, persistent nausea and vomiting, or yellowness of skin.          Again, thank you for allowing me to participate in the care of your patient.      Sincerely,      Sonu Hammond MD

## 2024-10-10 ENCOUNTER — TELEPHONE (OUTPATIENT)
Dept: SURGERY | Facility: CLINIC | Age: 49
End: 2024-10-10
Payer: COMMERCIAL

## 2024-10-10 NOTE — TELEPHONE ENCOUNTER
Type of surgery: Lap virgilio  Location of surgery: [facility]  Date and time of surgery: 11/1/24 at 12pm  Surgeon: Dr. Sonu Hammond  Pre-Op Appt Date: patient to schedule  Post-Op Appt Date: patient to schedule   Packet sent out: Yes  Pre-cert/Authorization completed:  Not Applicable  Date: 10/10/24

## 2024-10-13 ENCOUNTER — MYC MEDICAL ADVICE (OUTPATIENT)
Dept: FAMILY MEDICINE | Facility: CLINIC | Age: 49
End: 2024-10-13
Payer: COMMERCIAL

## 2024-10-15 NOTE — TELEPHONE ENCOUNTER
Dr. Rodrigues,     Please see below MyChart message and advise.     Pt planning blood donation on 10/18/24 and gallbladder surgery on 11/01/24 (two weeks apart). Is this recommended?    Thanks,   Thea TOTH RN

## 2024-10-21 ENCOUNTER — OFFICE VISIT (OUTPATIENT)
Dept: FAMILY MEDICINE | Facility: CLINIC | Age: 49
End: 2024-10-21
Payer: COMMERCIAL

## 2024-10-21 VITALS
RESPIRATION RATE: 16 BRPM | HEART RATE: 86 BPM | DIASTOLIC BLOOD PRESSURE: 75 MMHG | BODY MASS INDEX: 26.71 KG/M2 | SYSTOLIC BLOOD PRESSURE: 129 MMHG | WEIGHT: 190.8 LBS | TEMPERATURE: 97.3 F | HEIGHT: 71 IN | OXYGEN SATURATION: 96 %

## 2024-10-21 DIAGNOSIS — E78.1 HYPERTRIGLYCERIDEMIA: ICD-10-CM

## 2024-10-21 DIAGNOSIS — N95.1 MENOPAUSAL SYNDROME (HOT FLASHES): ICD-10-CM

## 2024-10-21 DIAGNOSIS — G47.00 INSOMNIA, UNSPECIFIED TYPE: ICD-10-CM

## 2024-10-21 DIAGNOSIS — E06.3 HASHIMOTO'S THYROIDITIS: ICD-10-CM

## 2024-10-21 DIAGNOSIS — K80.20 GALLSTONES: ICD-10-CM

## 2024-10-21 DIAGNOSIS — Z01.818 PREOP GENERAL PHYSICAL EXAM: Primary | ICD-10-CM

## 2024-10-21 DIAGNOSIS — J45.20 MILD INTERMITTENT ASTHMA WITHOUT COMPLICATION: ICD-10-CM

## 2024-10-21 PROCEDURE — 99215 OFFICE O/P EST HI 40 MIN: CPT | Performed by: FAMILY MEDICINE

## 2024-10-21 ASSESSMENT — PAIN SCALES - GENERAL: PAINLEVEL: NO PAIN (0)

## 2024-10-21 NOTE — PROGRESS NOTES
Preoperative Evaluation  [facility] [place]  [address], [address]  [facility] [address]  Phone: [phone]  Primary Provider: Jeanette Rodrigues DO  Pre-op Performing Provider: Lucien Ricketts DO  Oct 21, 2024             10/17/2024   Surgical Information   What procedure is being done? Gallbladder removal   Facility or Hospital where procedure/surgery will be performed: [facility]   Who is doing the procedure / surgery? Dr Sonu Hammond   Date of surgery / procedure: November 1, 2024   Time of surgery / procedure: 10:00 AM   Where do you plan to recover after surgery? at home with family        Fax number for surgical facility: Note does not need to be faxed, will be available electronically in Epic.    Assessment & Plan     The proposed surgical procedure is considered INTERMEDIATE risk.    Preop general physical exam  Patient has no cardiac, kidney, or medication concerns with reassuring CMP and CBC values from 9/30/24. Advised patient to stop ibuprofen and any supplements 1 week prior to the procedure.     Gallstones  Laparoscopic cholecystectomy scheduled for November 1st.     Hypertriglyceridemia  She will continue to work on lifestyle modifications to help bring the cholesterol numbers down.    Insomnia, unspecified type  Amitriptyline 25 mg at night.     Menopausal syndrome (hot flashes)  Venlafaxine 75 mg at night.    Hashimoto's thyroiditis  Levothyroxine 25 mcg, ok to take morning of procedure with clear liquids. Was recently diagnosed with thyroid disease and started on medication at the beginning of October.     Mild intermittent asthma without complication  Levalbuterol as needed. Not concerned for pulmonary complications at this time.       Preoperative Medication Instructions   Antiplatelet or Anticoagulation Medication Instructions   - Patient is on no antiplatelet or anticoagulation medications.    Additional Medication Instructions  Take all  scheduled medications on the day of surgery        - No identified additional risk factors other than previously addressed      Recommendation  Approval given to proceed with proposed procedure, without further diagnostic evaluation.      40 minutes spent by me on the date of the encounter doing chart review, review of test results, interpretation of tests, patient visit, and documentation     Subjective   Marjorie is a 49 year old, presenting for the following:  Pre-Op Exam          10/21/2024     1:41 PM   Additional Questions   Roomed by Ranjeet MINOR related to upcoming procedure: Marjorie is a 49 year old F with PMHx of asthma, Hashimoto's thyroiditis, scheduled for laparoscopic cholecystectomy on November 1st. She has been experiencing symptoms of RUQ abdominal pain since September and was found to have cholelithiasis on US 10/4/24.     Previous history of SVT diagnosis, chest palpitations in 2011, assessed with EKG and Ziopatch without significant findings. No current cardiac concerns or conditions. Family history of father with heart disease and heart attack at age 45, high blood pressure in both parents.     Patient was previously alternating between Advil and ibuprofen for pain, not currently using medication for pain control.           10/17/2024   Pre-Op Questionnaire   Have you ever had a heart attack or stroke? No   Have you ever had surgery on your heart or blood vessels, such as a stent placement, a coronary artery bypass, or surgery on an artery in your head, neck, heart, or legs? No   Do you have chest pain with activity? No   Do you have a history of heart failure? No   Do you currently have a cold, bronchitis or symptoms of other infection? No   Do you have a cough, shortness of breath, or wheezing? No   Do you or anyone in your family have previous history of blood clots? No   Do you or does anyone in your family have a serious bleeding problem such as prolonged bleeding following surgeries or  cuts? No   Have you ever had problems with anemia or been told to take iron pills? No   Have you had any abnormal blood loss such as black, tarry or bloody stools, or abnormal vaginal bleeding? No   Have you ever had a blood transfusion? No   Are you willing to have a blood transfusion if it is medically needed before, during, or after your surgery? Yes   Have you or any of your relatives ever had problems with anesthesia? No   Do you have sleep apnea, excessive snoring or daytime drowsiness? No   Do you have any artifical heart valves or other implanted medical devices like a pacemaker, defibrillator, or continuous glucose monitor? No   Do you have artificial joints? No   Are you allergic to latex? No        Health Care Directive  Patient has a Health Care Directive on file      Preoperative Review of    reviewed - no record of controlled substances prescribed.          Patient Active Problem List    Diagnosis Date Noted    BCC (basal cell carcinoma), face 09/28/2022     Priority: Medium     Saw derm [facility]  Having mohs in 10/2022      ACP (advance care planning) 10/06/2017     Priority: Medium    Thyroid nodule 01/07/2015     Priority: Medium    Hashimoto's thyroiditis 07/09/2014     Priority: Medium     Noted with elevated antibodies  TSH is still WNL -   Took meds from 2014 - 2018 stopped  Will monitor annually and treat if becomes clinical.        Thyroid enlargement 07/09/2014     Priority: Medium    Family history of hemochromatosis 05/03/2013     Priority: Medium     Pt tested positive for one minor genetic mutation but no clinical significance      Insomnia 03/22/2011     Priority: Medium     Uses elavil nightly  Tried trazodone but made her dizzy in AM  Trouble staying asleep - not an issue falling asleep  Taking tizanidine and amitriptyline      SVT (supraventricular tachycardia) (H) 03/22/2011     Priority: Medium     Found on holter monitor corresponding to symptoms  Improving        Intermittent  asthma 11/08/2010     Priority: Medium    CARDIOVASCULAR SCREENING; LDL GOAL LESS THAN 160 10/31/2010     Priority: Medium    Hypertriglyceridemia 03/19/2010     Priority: Medium    Low back pain 03/16/2009     Priority: Medium     2 steroid injections  Will start PT at [facility]        Family history of other cardiovascular diseases 01/31/2006     Priority: Medium     Father MI at age 54  Father also has hematochromatosis    Problem list name updated by automated process. Provider to review      Allergic rhinitis due to pollen 03/15/2005     Priority: Medium      Past Medical History:   Diagnosis Date    Allergic rhinitis due to other allergen     Arthritis     BCC (basal cell carcinoma), face 9/28/2022    Insomnia     benadryl nightly (50mg), se's on trazodone    Mild intermittent asthma     uses maxair 2-3x/yr    Personal history of other diseases of circulatory system 1995    Virally induced stroke- high fever, resolved completely    SVT (supraventricular tachycardia) (H)     Thyroid disease May 2014    Hypothyroidism     Past Surgical History:   Procedure Laterality Date    COLONOSCOPY N/A 1/5/2023    Procedure: COLONOSCOPY, WITH POLYPECTOMY;  Surgeon: Vesta Conner MD;  Location: [facility] OR    ENT SURGERY  1990    2 surgeries for deviated septum    SURGICAL HISTORY OF -   2000    wisdom teeth extraction    Z APPENDECTOMY  4/1989    Z NONSPECIFIC PROCEDURE  6/1990    Nasal surgery for deviated septum    Z NONSPECIFIC PROCEDURE  11/1990    Nasal Surgery for deviated septum     Current Outpatient Medications   Medication Sig Dispense Refill    amitriptyline (ELAVIL) 25 MG tablet TAKE 3 TABLETS BY MOUTH NIGHTLY AS NEEDED FOR SLEEP 270 tablet 3    estradiol (VAGIFEM) 10 MCG TABS vaginal tablet Place 1 tablet (10 mcg) vaginally twice a week. 24 tablet 3    Lactase (LACTAID PO)       levalbuterol (XOPENEX HFA) 45 MCG/ACT inhaler Inhale 2 puffs into the lungs every 4 hours as needed for shortness  "of breath / dyspnea or wheezing 15 g 2    levothyroxine (SYNTHROID/LEVOTHROID) 25 MCG tablet Take 1 tablet (25 mcg) by mouth daily. 60 tablet 0    polyethylene glycol (MIRALAX) 17 GM/Dose powder Take 17 g (1 Capful) by mouth daily      tiZANidine (ZANAFLEX) 4 MG tablet Take 1 tablet (4 mg) by mouth nightly as needed for muscle spasms. 90 tablet 3    UNKNOWN MED DOSAGE digestive advantage for lactose intolorance - 1 tab daily      venlafaxine (EFFEXOR XR) 75 MG 24 hr capsule Take 1 capsule (75 mg) by mouth daily. 90 capsule 3    VITAMIN D, CHOLECALCIFEROL, PO Take by mouth daily          Allergies   Allergen Reactions    Erythromycin Rash    Penicillins Rash    Sulfa Antibiotics Rash        Social History     Tobacco Use    Smoking status: Never    Smokeless tobacco: Never   Substance Use Topics    Alcohol use: Yes     Comment: Occas.       History   Drug Use No             Review of Systems  Constitutional, HEENT, cardiovascular, pulmonary, GI, , musculoskeletal, neuro, skin, endocrine and psych systems are negative, except as otherwise noted.    Objective    /75   Pulse 86   Temp 97.3  F (36.3  C) (Temporal)   Resp 16   Ht 1.803 m (5' 11\")   Wt 86.5 kg (190 lb 12.8 oz)   LMP 07/05/2020 (Exact Date)   SpO2 96%   BMI 26.61 kg/m     Estimated body mass index is 26.61 kg/m  as calculated from the following:    Height as of this encounter: 1.803 m (5' 11\").    Weight as of this encounter: 86.5 kg (190 lb 12.8 oz).  Physical Exam  GENERAL: alert and no distress  EYES: Eyes grossly normal to inspection, PERRL and conjunctivae and sclerae normal  NECK: no adenopathy, no asymmetry, masses, or scars  RESP: lungs clear to auscultation - no rales, rhonchi or wheezes  CV: regular rate and rhythm, normal S1 S2, no S3 or S4, no murmur, click or rub, no peripheral edema  ABDOMEN: soft, nontender, no hepatosplenomegaly, no masses and bowel sounds normal  MS: no gross musculoskeletal defects noted, no edema  SKIN: no " suspicious lesions or rashes  NEURO: Normal strength and tone, mentation intact and speech normal  PSYCH: mentation appears normal, affect normal/bright    Recent Labs   Lab Test 09/30/24  0013   HGB 12.9         POTASSIUM 4.1   CR 0.73        Diagnostics  No labs were ordered during this visit.   No EKG required, no history of coronary heart disease, significant arrhythmia, peripheral arterial disease or other structural heart disease.    Revised Cardiac Risk Index (RCRI)  The patient has the following serious cardiovascular risks for perioperative complications:   - No serious cardiac risks = 0 points     RCRI Interpretation: 0 points: Class I (very low risk - 0.4% complication rate)       Peg Vila, MS3     I personally interviewed and examined the patient and agree with the note as above.  Signed Electronically by: Lucien Ricketts DO  A copy of this evaluation report is provided to the requesting physician.

## 2024-10-21 NOTE — PATIENT INSTRUCTIONS
How to Take Your Medication Before Surgery  Preoperative Medication Instructions   Antiplatelet or Anticoagulation Medication Instructions   - Patient is on no antiplatelet or anticoagulation medications.    Additional Medication Instructions  Take all scheduled medications on the day of surgery       Patient Education   Preparing for Your Surgery  For Adults  Getting started  In most cases, a nurse will call to review your health history and instructions. They will give you an arrival time based on your scheduled surgery time. Please be ready to share:  Your doctor's clinic name and phone number  Your medical, surgical, and anesthesia history  A list of allergies and sensitivities  A list of medicines, including herbal treatments and over-the-counter drugs  Whether the patient has a legal guardian (ask how to send us the papers in advance)  Note: You may not receive a call if you were seen at our PAC (Preoperative Assessment Center).  Please tell us if you're pregnant--or if there's any chance you might be pregnant. Some surgeries may injure a fetus (unborn baby), so they require a pregnancy test. Surgeries that are safe for a fetus don't always need a test, and you can choose whether to have one.   Preparing for surgery  Within 10 to 30 days of surgery: Have a pre-op exam (sometimes called an H&P, or History and Physical). This can be done at a clinic or pre-operative center.  If you're having a , you may not need this exam. Talk to your care team.  At your pre-op exam, talk to your care team about all medicines you take. (This includes CBD oil and any drugs, such as THC, marijuana, and other forms of cannabis.) If you need to stop any medicine before surgery, ask when to start taking it again.  This is for your safety. Many medicines and drugs can make you bleed too much during surgery. Some change how well surgery (anesthesia) drugs work.  Call your insurance company to let them know you're having  surgery. (If you don't have insurance, call [phone].)  Call your clinic if there's any change in your health. This includes a scrape or scratch near the surgery site, or any signs of a cold (sore throat, runny nose, cough, rash, fever).  Eating and drinking guidelines  For your safety: Unless your surgeon tells you otherwise, follow the guidelines below.  Eat and drink as normal until 8 hours before you arrive for surgery. After that, no food or milk. You can spit out gum when you arrive.  Drink clear liquids until 2 hours before you arrive. These are liquids you can see through, like water, Gatorade, and Propel Water. They also include plain black coffee and tea (no cream or milk).  No alcohol for 24 hours before you arrive. The night before surgery, stop any drinks that contain THC.  If your care team tells you to take medicine on the morning of surgery, it's okay to take it with a sip of water. No other medicines or drugs are allowed (including CBD oil)--follow your care team's instructions.  If you have questions the day of surgery, call your hospital or surgery center.   Preventing infection  Shower or bathe the night before and the morning of surgery. Follow the instructions your clinic gave you. (If no instructions, use regular soap.)  Don't shave or clip hair near your surgery site. We'll remove the hair if needed.  Don't smoke or vape the morning of surgery. No chewing tobacco for 6 hours before you arrive. A nicotine patch is okay. You may spit out nicotine gum when you arrive.  For some surgeries, the surgeon will tell you to fully quit smoking and nicotine.  We will make every effort to keep you safe from infection. We will:  Clean our hands often with soap and water (or an alcohol-based hand rub).  Clean the skin at your surgery site with a special soap that kills germs.  Give you a special gown to keep you warm. (Cold raises the risk of infection.)  Wear hair covers, masks, gowns, and gloves  during surgery.  Give antibiotic medicine, if prescribed. Not all surgeries need this medicine.  What to bring on the day of surgery  Photo ID and insurance card  Copy of your health care directive, if you have one  Glasses and hearing aids (bring cases)  You can't wear contacts during surgery  Inhaler and eye drops, if you use them (tell us about these when you arrive)  CPAP machine or breathing device, if you use them  A few personal items, if spending the night  If you have . . .  A pacemaker, ICD (cardiac defibrillator), or other implant: Bring the ID card.  An implanted stimulator: Bring the remote control.  A legal guardian: Bring a copy of the certified (court-stamped) guardianship papers.  Please remove any jewelry, including body piercings. Leave jewelry and other valuables at home.  If you're going home the day of surgery  You must have a responsible adult drive you home. They should stay with you overnight as well.  If you don't have someone to stay with you, and you aren't safe to go home alone, we may keep you overnight. Insurance often won't pay for this.  After surgery  If it's hard to control your pain or you need more pain medicine, please call your surgeon's office.  Questions?   If you have any questions for your care team, list them here:   ____________________________________________________________________________________________________________________________________________________________________________________________________________________________________________________________  For informational purposes only. Not to replace the advice of your health care provider. Copyright   2003, 2019 [facility]. All rights reserved. Clinically reviewed by Mario Beauchamp MD. [employer] [id number] - REV 08/24.

## 2024-10-28 ENCOUNTER — ANCILLARY PROCEDURE (OUTPATIENT)
Dept: MAMMOGRAPHY | Facility: CLINIC | Age: 49
End: 2024-10-28
Attending: FAMILY MEDICINE
Payer: COMMERCIAL

## 2024-10-28 DIAGNOSIS — Z12.31 VISIT FOR SCREENING MAMMOGRAM: ICD-10-CM

## 2024-10-28 PROCEDURE — 77063 BREAST TOMOSYNTHESIS BI: CPT | Mod: GC | Performed by: RADIOLOGY

## 2024-10-28 PROCEDURE — 77067 SCR MAMMO BI INCL CAD: CPT | Mod: GC | Performed by: RADIOLOGY

## 2024-11-01 ENCOUNTER — HOSPITAL ENCOUNTER (OUTPATIENT)
Facility: CLINIC | Age: 49
Discharge: HOME | End: 2024-11-01
Attending: SURGERY | Admitting: SURGERY
Payer: COMMERCIAL

## 2024-11-01 ENCOUNTER — ANESTHESIA EVENT (OUTPATIENT)
Dept: SURGERY | Facility: CLINIC | Age: 49
End: 2024-11-01
Payer: COMMERCIAL

## 2024-11-01 ENCOUNTER — APPOINTMENT (OUTPATIENT)
Dept: SURGERY | Facility: PHYSICIAN GROUP | Age: 49
End: 2024-11-01
Payer: COMMERCIAL

## 2024-11-01 ENCOUNTER — ANESTHESIA (OUTPATIENT)
Dept: SURGERY | Facility: CLINIC | Age: 49
End: 2024-11-01
Payer: COMMERCIAL

## 2024-11-01 VITALS
OXYGEN SATURATION: 97 % | WEIGHT: 190.6 LBS | TEMPERATURE: 98 F | HEIGHT: 71 IN | SYSTOLIC BLOOD PRESSURE: 100 MMHG | DIASTOLIC BLOOD PRESSURE: 60 MMHG | BODY MASS INDEX: 26.68 KG/M2 | RESPIRATION RATE: 12 BRPM | HEART RATE: 78 BPM

## 2024-11-01 DIAGNOSIS — G89.18 ACUTE POST-OPERATIVE PAIN: Primary | ICD-10-CM

## 2024-11-01 LAB — HCG UR QL: NEGATIVE

## 2024-11-01 PROCEDURE — 47562 LAPAROSCOPIC CHOLECYSTECTOMY: CPT | Performed by: ANESTHESIOLOGY

## 2024-11-01 PROCEDURE — 47562 LAPAROSCOPIC CHOLECYSTECTOMY: CPT | Performed by: SURGERY

## 2024-11-01 PROCEDURE — 250N000009 HC RX 250: Performed by: STUDENT IN AN ORGANIZED HEALTH CARE EDUCATION/TRAINING PROGRAM

## 2024-11-01 PROCEDURE — 250N000009 HC RX 250: Performed by: ANESTHESIOLOGY

## 2024-11-01 PROCEDURE — 250N000009 HC RX 250: Performed by: SURGERY

## 2024-11-01 PROCEDURE — 370N000017 HC ANESTHESIA TECHNICAL FEE, PER MIN: Performed by: SURGERY

## 2024-11-01 PROCEDURE — 88304 TISSUE EXAM BY PATHOLOGIST: CPT | Mod: TC | Performed by: SURGERY

## 2024-11-01 PROCEDURE — 710N000012 HC RECOVERY PHASE 2, PER MINUTE: Performed by: SURGERY

## 2024-11-01 PROCEDURE — 258N000003 HC RX IP 258 OP 636: Performed by: ANESTHESIOLOGY

## 2024-11-01 PROCEDURE — 250N000011 HC RX IP 250 OP 636: Performed by: ANESTHESIOLOGY

## 2024-11-01 PROCEDURE — 250N000025 HC SEVOFLURANE, PER MIN: Performed by: SURGERY

## 2024-11-01 PROCEDURE — 360N000076 HC SURGERY LEVEL 3, PER MIN: Performed by: SURGERY

## 2024-11-01 PROCEDURE — 272N000001 HC OR GENERAL SUPPLY STERILE: Performed by: SURGERY

## 2024-11-01 PROCEDURE — 710N000009 HC RECOVERY PHASE 1, LEVEL 1, PER MIN: Performed by: SURGERY

## 2024-11-01 PROCEDURE — 47562 LAPAROSCOPIC CHOLECYSTECTOMY: CPT | Performed by: NURSE ANESTHETIST, CERTIFIED REGISTERED

## 2024-11-01 PROCEDURE — 250N000011 HC RX IP 250 OP 636: Performed by: STUDENT IN AN ORGANIZED HEALTH CARE EDUCATION/TRAINING PROGRAM

## 2024-11-01 PROCEDURE — 47562 LAPAROSCOPIC CHOLECYSTECTOMY: CPT | Mod: AS | Performed by: PHYSICIAN ASSISTANT

## 2024-11-01 PROCEDURE — 250N000011 HC RX IP 250 OP 636: Performed by: SURGERY

## 2024-11-01 PROCEDURE — 250N000013 HC RX MED GY IP 250 OP 250 PS 637: Performed by: PHYSICIAN ASSISTANT

## 2024-11-01 PROCEDURE — 81025 URINE PREGNANCY TEST: CPT | Performed by: STUDENT IN AN ORGANIZED HEALTH CARE EDUCATION/TRAINING PROGRAM

## 2024-11-01 PROCEDURE — 999N000141 HC STATISTIC PRE-PROCEDURE NURSING ASSESSMENT: Performed by: SURGERY

## 2024-11-01 RX ORDER — AMOXICILLIN 250 MG
1-2 CAPSULE ORAL 2 TIMES DAILY PRN
Qty: 12 TABLET | Refills: 0 | Status: SHIPPED | OUTPATIENT
Start: 2024-11-01

## 2024-11-01 RX ORDER — PROPOFOL 10 MG/ML
INJECTION, EMULSION INTRAVENOUS PRN
Status: DISCONTINUED | OUTPATIENT
Start: 2024-11-01 | End: 2024-11-01

## 2024-11-01 RX ORDER — HYDROCODONE BITARTRATE AND ACETAMINOPHEN 5; 325 MG/1; MG/1
1 TABLET ORAL
Status: COMPLETED | OUTPATIENT
Start: 2024-11-01 | End: 2024-11-01

## 2024-11-01 RX ORDER — NALOXONE HYDROCHLORIDE 0.4 MG/ML
0.1 INJECTION, SOLUTION INTRAMUSCULAR; INTRAVENOUS; SUBCUTANEOUS
Status: DISCONTINUED | OUTPATIENT
Start: 2024-11-01 | End: 2024-11-01 | Stop reason: HOSPADM

## 2024-11-01 RX ORDER — LIDOCAINE HYDROCHLORIDE 20 MG/ML
INJECTION, SOLUTION INFILTRATION; PERINEURAL PRN
Status: DISCONTINUED | OUTPATIENT
Start: 2024-11-01 | End: 2024-11-01

## 2024-11-01 RX ORDER — GLYCOPYRROLATE 0.2 MG/ML
INJECTION, SOLUTION INTRAMUSCULAR; INTRAVENOUS PRN
Status: DISCONTINUED | OUTPATIENT
Start: 2024-11-01 | End: 2024-11-01

## 2024-11-01 RX ORDER — ONDANSETRON 4 MG/1
4 TABLET, ORALLY DISINTEGRATING ORAL EVERY 30 MIN PRN
Status: DISCONTINUED | OUTPATIENT
Start: 2024-11-01 | End: 2024-11-01 | Stop reason: HOSPADM

## 2024-11-01 RX ORDER — FENTANYL CITRATE 0.05 MG/ML
25 INJECTION, SOLUTION INTRAMUSCULAR; INTRAVENOUS EVERY 5 MIN PRN
Status: DISCONTINUED | OUTPATIENT
Start: 2024-11-01 | End: 2024-11-01 | Stop reason: HOSPADM

## 2024-11-01 RX ORDER — HYDROCODONE BITARTRATE AND ACETAMINOPHEN 5; 325 MG/1; MG/1
1-2 TABLET ORAL EVERY 4 HOURS PRN
Qty: 15 TABLET | Refills: 0 | Status: SHIPPED | OUTPATIENT
Start: 2024-11-01

## 2024-11-01 RX ORDER — EPHEDRINE SULFATE 50 MG/ML
INJECTION, SOLUTION INTRAMUSCULAR; INTRAVENOUS; SUBCUTANEOUS PRN
Status: DISCONTINUED | OUTPATIENT
Start: 2024-11-01 | End: 2024-11-01

## 2024-11-01 RX ORDER — SODIUM CHLORIDE, SODIUM LACTATE, POTASSIUM CHLORIDE, CALCIUM CHLORIDE 600; 310; 30; 20 MG/100ML; MG/100ML; MG/100ML; MG/100ML
INJECTION, SOLUTION INTRAVENOUS CONTINUOUS
Status: DISCONTINUED | OUTPATIENT
Start: 2024-11-01 | End: 2024-11-01 | Stop reason: HOSPADM

## 2024-11-01 RX ORDER — DEXAMETHASONE SODIUM PHOSPHATE 4 MG/ML
INJECTION, SOLUTION INTRA-ARTICULAR; INTRALESIONAL; INTRAMUSCULAR; INTRAVENOUS; SOFT TISSUE PRN
Status: DISCONTINUED | OUTPATIENT
Start: 2024-11-01 | End: 2024-11-01

## 2024-11-01 RX ORDER — CLINDAMYCIN PHOSPHATE 900 MG/50ML
900 INJECTION, SOLUTION INTRAVENOUS SEE ADMIN INSTRUCTIONS
Status: DISCONTINUED | OUTPATIENT
Start: 2024-11-01 | End: 2024-11-01 | Stop reason: HOSPADM

## 2024-11-01 RX ORDER — HYDROMORPHONE HCL IN WATER/PF 6 MG/30 ML
0.2 PATIENT CONTROLLED ANALGESIA SYRINGE INTRAVENOUS EVERY 5 MIN PRN
Status: DISCONTINUED | OUTPATIENT
Start: 2024-11-01 | End: 2024-11-01 | Stop reason: HOSPADM

## 2024-11-01 RX ORDER — SODIUM CHLORIDE, SODIUM LACTATE, POTASSIUM CHLORIDE, CALCIUM CHLORIDE 600; 310; 30; 20 MG/100ML; MG/100ML; MG/100ML; MG/100ML
INJECTION, SOLUTION INTRAVENOUS CONTINUOUS PRN
Status: DISCONTINUED | OUTPATIENT
Start: 2024-11-01 | End: 2024-11-01

## 2024-11-01 RX ORDER — FENTANYL CITRATE 50 UG/ML
INJECTION, SOLUTION INTRAMUSCULAR; INTRAVENOUS PRN
Status: DISCONTINUED | OUTPATIENT
Start: 2024-11-01 | End: 2024-11-01

## 2024-11-01 RX ORDER — BUPIVACAINE HYDROCHLORIDE AND EPINEPHRINE 2.5; 5 MG/ML; UG/ML
INJECTION, SOLUTION INFILTRATION; PERINEURAL PRN
Status: DISCONTINUED | OUTPATIENT
Start: 2024-11-01 | End: 2024-11-01 | Stop reason: HOSPADM

## 2024-11-01 RX ORDER — HYDROMORPHONE HCL IN WATER/PF 6 MG/30 ML
0.4 PATIENT CONTROLLED ANALGESIA SYRINGE INTRAVENOUS EVERY 5 MIN PRN
Status: DISCONTINUED | OUTPATIENT
Start: 2024-11-01 | End: 2024-11-01 | Stop reason: HOSPADM

## 2024-11-01 RX ORDER — DEXAMETHASONE SODIUM PHOSPHATE 4 MG/ML
4 INJECTION, SOLUTION INTRA-ARTICULAR; INTRALESIONAL; INTRAMUSCULAR; INTRAVENOUS; SOFT TISSUE
Status: DISCONTINUED | OUTPATIENT
Start: 2024-11-01 | End: 2024-11-01 | Stop reason: HOSPADM

## 2024-11-01 RX ORDER — FENTANYL CITRATE 0.05 MG/ML
50 INJECTION, SOLUTION INTRAMUSCULAR; INTRAVENOUS EVERY 5 MIN PRN
Status: DISCONTINUED | OUTPATIENT
Start: 2024-11-01 | End: 2024-11-01 | Stop reason: HOSPADM

## 2024-11-01 RX ORDER — ONDANSETRON 2 MG/ML
4 INJECTION INTRAMUSCULAR; INTRAVENOUS EVERY 30 MIN PRN
Status: DISCONTINUED | OUTPATIENT
Start: 2024-11-01 | End: 2024-11-01 | Stop reason: HOSPADM

## 2024-11-01 RX ORDER — ONDANSETRON 2 MG/ML
INJECTION INTRAMUSCULAR; INTRAVENOUS PRN
Status: DISCONTINUED | OUTPATIENT
Start: 2024-11-01 | End: 2024-11-01

## 2024-11-01 RX ORDER — SCOLOPAMINE TRANSDERMAL SYSTEM 1 MG/1
1 PATCH, EXTENDED RELEASE TRANSDERMAL
Status: DISCONTINUED | OUTPATIENT
Start: 2024-11-01 | End: 2024-11-01 | Stop reason: HOSPADM

## 2024-11-01 RX ORDER — MAGNESIUM HYDROXIDE 1200 MG/15ML
LIQUID ORAL PRN
Status: DISCONTINUED | OUTPATIENT
Start: 2024-11-01 | End: 2024-11-01 | Stop reason: HOSPADM

## 2024-11-01 RX ORDER — CLINDAMYCIN PHOSPHATE 900 MG/50ML
900 INJECTION, SOLUTION INTRAVENOUS
Status: COMPLETED | OUTPATIENT
Start: 2024-11-01 | End: 2024-11-01

## 2024-11-01 RX ORDER — PROPOFOL 10 MG/ML
INJECTION, EMULSION INTRAVENOUS CONTINUOUS PRN
Status: DISCONTINUED | OUTPATIENT
Start: 2024-11-01 | End: 2024-11-01

## 2024-11-01 RX ORDER — LIDOCAINE 40 MG/G
CREAM TOPICAL
Status: DISCONTINUED | OUTPATIENT
Start: 2024-11-01 | End: 2024-11-01 | Stop reason: HOSPADM

## 2024-11-01 RX ADMIN — PHENYLEPHRINE HYDROCHLORIDE 100 MCG: 10 INJECTION INTRAVENOUS at 13:30

## 2024-11-01 RX ADMIN — ONDANSETRON 4 MG: 2 INJECTION, SOLUTION INTRAMUSCULAR; INTRAVENOUS at 15:22

## 2024-11-01 RX ADMIN — DEXAMETHASONE SODIUM PHOSPHATE 4 MG: 4 INJECTION, SOLUTION INTRA-ARTICULAR; INTRALESIONAL; INTRAMUSCULAR; INTRAVENOUS; SOFT TISSUE at 13:28

## 2024-11-01 RX ADMIN — LIDOCAINE HYDROCHLORIDE 100 MG: 20 INJECTION, SOLUTION INFILTRATION; PERINEURAL at 13:13

## 2024-11-01 RX ADMIN — HYDROMORPHONE HYDROCHLORIDE 0.4 MG: 0.2 INJECTION, SOLUTION INTRAMUSCULAR; INTRAVENOUS; SUBCUTANEOUS at 15:34

## 2024-11-01 RX ADMIN — PROPOFOL 30 MCG/KG/MIN: 10 INJECTION, EMULSION INTRAVENOUS at 13:17

## 2024-11-01 RX ADMIN — FENTANYL CITRATE 25 MCG: 50 INJECTION INTRAMUSCULAR; INTRAVENOUS at 13:13

## 2024-11-01 RX ADMIN — ROCURONIUM BROMIDE 35 MG: 50 INJECTION, SOLUTION INTRAVENOUS at 13:13

## 2024-11-01 RX ADMIN — SCOPALAMINE 1 PATCH: 1 PATCH, EXTENDED RELEASE TRANSDERMAL at 11:21

## 2024-11-01 RX ADMIN — ONDANSETRON 4 MG: 2 INJECTION INTRAMUSCULAR; INTRAVENOUS at 13:55

## 2024-11-01 RX ADMIN — SODIUM CHLORIDE, POTASSIUM CHLORIDE, SODIUM LACTATE AND CALCIUM CHLORIDE: 600; 310; 30; 20 INJECTION, SOLUTION INTRAVENOUS at 13:04

## 2024-11-01 RX ADMIN — GLYCOPYRROLATE 0.2 MG: 0.2 INJECTION, SOLUTION INTRAMUSCULAR; INTRAVENOUS at 13:35

## 2024-11-01 RX ADMIN — MIDAZOLAM 2 MG: 1 INJECTION INTRAMUSCULAR; INTRAVENOUS at 13:04

## 2024-11-01 RX ADMIN — PHENYLEPHRINE HYDROCHLORIDE 100 MCG: 10 INJECTION INTRAVENOUS at 13:35

## 2024-11-01 RX ADMIN — FENTANYL CITRATE 75 MCG: 50 INJECTION INTRAMUSCULAR; INTRAVENOUS at 13:26

## 2024-11-01 RX ADMIN — Medication 10 MG: at 13:40

## 2024-11-01 RX ADMIN — Medication 200 MG: at 13:55

## 2024-11-01 RX ADMIN — PROPOFOL 200 MG: 10 INJECTION, EMULSION INTRAVENOUS at 13:13

## 2024-11-01 RX ADMIN — PHENYLEPHRINE HYDROCHLORIDE 200 MCG: 10 INJECTION INTRAVENOUS at 13:32

## 2024-11-01 RX ADMIN — CLINDAMYCIN PHOSPHATE 900 MG: 900 INJECTION, SOLUTION INTRAVENOUS at 11:19

## 2024-11-01 RX ADMIN — Medication 5 MG: at 13:38

## 2024-11-01 RX ADMIN — HYDROCODONE BITARTRATE AND ACETAMINOPHEN 1 TABLET: 5; 325 TABLET ORAL at 15:32

## 2024-11-01 ASSESSMENT — ACTIVITIES OF DAILY LIVING (ADL)
ADLS_ACUITY_SCORE: 0

## 2024-11-01 ASSESSMENT — ENCOUNTER SYMPTOMS: DYSRHYTHMIAS: 1

## 2024-11-01 NOTE — BRIEF OP NOTE
[facility]    Brief Operative Note    Pre-operative diagnosis: Gallstones [K80.20]  Post-operative diagnosis Same as pre-operative diagnosis    Procedure: CHOLECYSTECTOMY, LAPAROSCOPIC, N/A - Abdomen    Surgeon: Surgeons and Role:     * Sonu Hammond MD - Primary     * Mynor Pacheco PA-C - Assisting    Anesthesia: General   Estimated Blood Loss: Less than 10 ml    Drains: None  Specimens:   ID Type Source Tests Collected by Time Destination   1 : Gallbladder and contents Tissue Gallbladder SURGICAL PATHOLOGY EXAM Sonu Hammond MD 11/1/2024  1:45 PM      Findings:   See Operative Report.  Slightly more difficult as gallbladder positioned intrahepatic.   .  Complications: None.  Implants: * No implants in log *      Gregg Pacheco PA-C  [phone]

## 2024-11-01 NOTE — ANESTHESIA PROCEDURE NOTES
Airway       Patient location during procedure: OR       Procedure Start/Stop Times: 11/1/2024 1:17 PM  Staff -        CRNA: Jordan Bowden APRN CRNA       Other Anesthesia Staff: Nael Braun       Performed By: with CRNAs and ROSA       Procedure performed by resident/fellow/CRNA in presence of a teaching physician.    Consent for Airway        Urgency: elective  Indications and Patient Condition       Indications for airway management: garrett-procedural       Induction type:intravenous       Mask difficulty assessment: 2 - vent by mask + OA or adjuvant +/- NMBA    Final Airway Details       Final airway type: endotracheal airway       Successful airway: ETT - single  Endotracheal Airway Details        ETT size (mm): 7.0       Cuffed: yes       Cuff volume (mL): 7       Successful intubation technique: direct laryngoscopy       DL Blade Type: MAC 3       Grade View of Cords: 1       Adjucts: stylet       Position: Right       Measured from: lips       Secured at (cm): 22       Bite block used: None    Post intubation assessment        Placement verified by: capnometry, equal breath sounds and chest rise        Number of attempts at approach: 1       Number of other approaches attempted: 0       Secured with: tape       Ease of procedure: easy       Dentition: Intact and Unchanged    Medication(s) Administered   Medication Administration Time: 11/1/2024 1:17 PM

## 2024-11-01 NOTE — ANESTHESIA PREPROCEDURE EVALUATION
Anesthesia Pre-Procedure Evaluation    Patient: Marjorie Wolf   MRN: [id number] : 1975        Procedure : Procedure(s):  CHOLECYSTECTOMY, LAPAROSCOPIC          Past Medical History:   Diagnosis Date    Allergic rhinitis due to other allergen     Arthritis     BCC (basal cell carcinoma), face 2022    Insomnia     benadryl nightly (50mg), se's on trazodone    Mild intermittent asthma     uses maxair 2-3x/yr    Personal history of other diseases of circulatory system     Virally induced stroke- high fever, resolved completely    SVT (supraventricular tachycardia) (H)     Thyroid disease May 2014    Hypothyroidism      Past Surgical History:   Procedure Laterality Date    COLONOSCOPY N/A 2023    Procedure: COLONOSCOPY, WITH POLYPECTOMY;  Surgeon: Vesta Conner MD;  Location: [facility] OR    ENT SURGERY      2 surgeries for deviated septum    SURGICAL HISTORY OF -       wisdom teeth extraction    ZZC APPENDECTOMY  1989    ZZC NONSPECIFIC PROCEDURE  1990    Nasal surgery for deviated septum    ZZC NONSPECIFIC PROCEDURE  1990    Nasal Surgery for deviated septum      Allergies   Allergen Reactions    Erythromycin Rash    Penicillins Rash    Sulfa Antibiotics Rash      Social History     Tobacco Use    Smoking status: Never    Smokeless tobacco: Never   Substance Use Topics    Alcohol use: Yes     Comment: Occas.      Wt Readings from Last 1 Encounters:   24 86.5 kg (190 lb 9.6 oz)        Anesthesia Evaluation   Pt has had prior anesthetic.     History of anesthetic complications  - PONV.      ROS/MED HX  ENT/Pulmonary:     (+)           allergic rhinitis,          Intermittent, asthma                  Neurologic:    (-) migraines   Cardiovascular:     (+)  - -   -  - -                        dysrhythmias (hx of svt), Other,          (-) hypertension and dyslipidemia   METS/Exercise Tolerance: >4 METS    Hematologic:       Musculoskeletal:       GI/Hepatic:    (-) GERD  "  Renal/Genitourinary:    (-) renal disease   Endo:     (+)          thyroid problem,         (-) obesity   Psychiatric/Substance Use:       Infectious Disease:       Malignancy:   (+) Malignancy, History of Skin.    Other:            Physical Exam    Airway        Mallampati: II   TM distance: > 3 FB   Neck ROM: full   Mouth opening: > 3 cm    Respiratory Devices and Support         Dental       (+) Minor Abnormalities - some fillings, tiny chips      Cardiovascular   cardiovascular exam normal          Pulmonary   pulmonary exam normal                OUTSIDE LABS:  CBC:   Lab Results   Component Value Date    WBC 6.9 09/30/2024    WBC 4.3 09/29/2023    HGB 12.9 09/30/2024    HGB 13.9 09/29/2023    HCT 40.3 09/30/2024    HCT 42.3 09/29/2023     09/30/2024     09/29/2023     BMP:   Lab Results   Component Value Date     09/30/2024     09/29/2023    POTASSIUM 4.1 09/30/2024    POTASSIUM 4.2 09/29/2023    CHLORIDE 105 09/30/2024    CHLORIDE 104 09/29/2023    CO2 22 09/30/2024    CO2 26 09/29/2023    BUN 10.9 09/30/2024    BUN 9.3 09/29/2023    CR 0.73 09/30/2024    CR 0.86 09/29/2023     (H) 09/30/2024    GLC 92 09/29/2023     COAGS: No results found for: \"PTT\", \"INR\", \"FIBR\"  POC:   Lab Results   Component Value Date    HCG Negative 11/01/2024    HCGS Negative 09/30/2024     HEPATIC:   Lab Results   Component Value Date    ALBUMIN 4.0 09/30/2024    PROTTOTAL 6.7 09/30/2024    ALT 47 09/30/2024    AST 47 (H) 09/30/2024    ALKPHOS 104 09/30/2024    BILITOTAL 0.2 09/30/2024     OTHER:   Lab Results   Component Value Date    MICAH 9.0 09/30/2024    MAG 2.4 (H) 09/23/2020    LIPASE 48 09/30/2024    TSH 12.70 (H) 09/30/2024    T4 0.82 (L) 09/30/2024    CRP 6.7 10/22/2010    SED 8 10/22/2010       Anesthesia Plan    ASA Status:  2    NPO Status:  NPO Appropriate    Anesthesia Type: General.     - Airway: ETT   Induction: Propofol.   Maintenance: Balanced.        Consents    Anesthesia Plan(s) " "and associated risks, benefits, and realistic alternatives discussed. Questions answered and patient/representative(s) expressed understanding.     - Discussed:     - Discussed with:  Patient            Postoperative Care    Pain management: IV analgesics, Oral pain medications, Multi-modal analgesia.   PONV prophylaxis: Ondansetron (or other 5HT-3), Dexamethasone or Solumedrol, Scopolamine patch, Background Propofol Infusion     Comments:               Prince Jalloh MD    I have reviewed the pertinent notes and labs in the chart from the past 30 days and (re)examined the patient.  Any updates or changes from those notes are reflected in this note.                       # Overweight: Estimated body mass index is 26.58 kg/m  as calculated from the following:    Height as of this encounter: 1.803 m (5' 11\").    Weight as of this encounter: 86.5 kg (190 lb 9.6 oz).       # Asthma: noted on problem list       "

## 2024-11-01 NOTE — PROGRESS NOTES
Pt. Discharged to phase 2 A&O. VSS. Room air. Scop patch in place. IV zofran x1 with relief to mild nausea. IV dilaudid and PO Norco given for abdominal discomfort. Pain manageable. Lap sites x3 dry and intact with ice applied.

## 2024-11-01 NOTE — DISCHARGE INSTRUCTIONS
You have a Scopolamine patch behind your left ear to prevent post operative nausea. Do not touch this patch and then touch your eyes as it can cause blurry vision.             [facility]  Discharge Instructions: Post-Operative Cholecystectomy    ACTIVITY  Expect to feel tired after your surgery.  This will gradually resolve.    Take frequent, short walks and increase your activity gradually.    Avoid strenuous physical activity or heavy lifting greater than 15-20 lbs. for 2-3 weeks.  You may climb stairs.  You may drive without restrictions when you are not using any prescription pain medication and feel comfortable in a car.  You may return to work/school when you are comfortable without any prescription pain medication.    WOUND CARE  You may remove your outer dressing or Band-Aids and shower 48 hours after the surgery.  Pat your incisions dry and leave them open to air.  Re-apply dressing (Band-Aids or gauze/tape) as needed for comfort or drainage.  You may have steri-strips (looks like white tape) on your incision.  You may peel off the steri-strips 2 weeks after your surgery if they have not peeled off on their own.  If you have skin glue, it will peel up and fall off on its own.  Do not soak your incisions in a tub or pool for 2 weeks.   Do not apply any lotions, creams, or ointments to your incisions.  A ridge under your incisions is normal and will gradually resolve.    DIET  Start with liquids, then gradually resume your regular diet as tolerated.  Avoid heavy, spicy, and greasy meals for 2-3 days.  Drink plenty of fluids to stay hydrated.  It is not uncommon to experience some loose stools or diarrhea after surgery.  This is your body's way of adapting to the bile which will slowly drain into your intestine.  A low fat diet may help with this.  This should improve over 1-2 months.    PAIN  Expect some tenderness and discomfort at the incision sites.  Use the prescribed  pain medication at your discretion.  Expect gradual resolution of your pain over several days.  You may take ibuprofen with food (unless you have been told not to) or acetaminophen/Tylenol instead of or in addition to your prescribed pain medication.  However, if you are taking Norco or Percocet, do not take any additional acetaminophen/Tylenol.  Do not drink alcohol or drive while you are taking pain medications.  You may apply ice to your incisions in 20 minute intervals as needed for the next 48 hours.  After that time, consider switching to heat if you prefer.    EXPECTATIONS  Pain medications can cause constipation.  Limit use when possible.  Take an over the counter or prescribed stool softener/stimulant, such as Colace or Senna, 1-2 times a day with plenty of water.  You may take a mild over the counter laxative, such as Miralax or a suppository, as needed.  You may discontinue these medications once you are having regular bowel movements and/or are no longer taking your narcotic pain medication.    You may have shoulder or upper back discomfort due to the gas used in surgery.  This is temporary and should resolve in 48-72 hours.  Short, frequent walks may help with this.  If you are unable to urinate for 8 hours or feel as though you are not emptying your bladder adequately, we recommend you seek care at an ER or Urgent Care facility for possible catheter placement.     FOLLOW UP  Our office will contact you in approximately 2-3 weeks to check on your progress and answer any questions you may have.  If you are doing well, you will not need to return for a follow up appointment.  If any concerns are identified over the phone, we will help you make an appointment to see a provider.   If you have not received a phone call, have any questions or concerns, or would like to be seen, please call us at [phone] and ask to speak with our nurse.  We are located at [address] [place] [address] [address] [place] [address]  [phone].    CALL OUR OFFICE -[phone] IF YOU HAVE:   Chills or fever above 101 F.  Increased redness, warmth, or drainage at your incisions.  Significant bleeding.  Pain not relieved by your pain medication or rest.  Increasing pain after the first 48 hours.  Any other concerns or questions.                        Revised October 2022       Same Day Surgery Discharge Instructions for  Sedation and General Anesthesia     It's not unusual to feel dizzy, light-headed or faint for up to 24 hours after surgery or while taking pain medication.  If you have these symptoms: sit for a few minutes before standing and have someone assist you when you get up to walk or use the bathroom.    You should rest and relax for the next 24 hours. We recommend you make arrangements to have an adult stay with you for at least 24 hours after your discharge.  Avoid hazardous and strenuous activity.    DO NOT DRIVE any vehicle or operate mechanical equipment for 24 hours following the end of your surgery.  Even though you may feel normal, your reactions may be affected by the medication you have received.    Do not drink alcoholic beverages for 24 hours following surgery.     Slowly progress to your regular diet as you feel able. It's not unusual to feel nauseated and/or vomit after receiving anesthesia.  If you develop these symptoms, drink clear liquids (apple juice, ginger ale, broth, 7-up, etc. ) until you feel better.  If your nausea and vomiting persists for 24 hours, please notify your surgeon.      All narcotic pain medications, along with inactivity and anesthesia, can cause constipation. Drinking plenty of liquids and increasing fiber intake will help.    For any questions of a medical nature, call your surgeon.    Do not make important decisions for 24 hours.    If you had general anesthesia, you may have a sore throat for a couple of days related to the breathing tube used during surgery.  You may use Cepacol lozenges to help  with this discomfort.  If it worsens or if you develop a fever, contact your surgeon.     If you feel your pain is not well managed with the pain medications prescribed by your surgeon, please contact your surgeon's office to let them know so they can address your concerns.      **If you have questions or concerns about your procedure,  call Dr. Hammond at [phone]**

## 2024-11-01 NOTE — ANESTHESIA CARE TRANSFER NOTE
Patient: Marjorie Wolf    Procedure: Procedure(s):  CHOLECYSTECTOMY, LAPAROSCOPIC       Diagnosis: Gallstones [K80.20]  Diagnosis Additional Information: No value filed.    Anesthesia Type:   General     Note:    Oropharynx: oropharynx clear of all foreign objects and spontaneously breathing  Level of Consciousness: awake  Oxygen Supplementation: face mask  Level of Supplemental Oxygen (L/min / FiO2): 6  Independent Airway: airway patency satisfactory and stable  Dentition: dentition unchanged  Vital Signs Stable: post-procedure vital signs reviewed and stable  Report to RN Given: handoff report given  Patient transferred to: PACU    Handoff Report: Identifed the Patient, Identified the Reponsible Provider, Reviewed the pertinent medical history, Discussed the surgical course, Reviewed Intra-OP anesthesia mangement and issues during anesthesia, Set expectations for post-procedure period and Allowed opportunity for questions and acknowledgement of understanding    Vitals:  Vitals Value Taken Time   /74 11/01/24 1407   Temp     Pulse 95 11/01/24 1408   Resp 15 11/01/24 1408   SpO2 100 % 11/01/24 1408   Vitals shown include unfiled device data.    Electronically Signed By: NICHO Palumbo CRNA  November 1, 2024  2:09 PM   4

## 2024-11-01 NOTE — ANESTHESIA POSTPROCEDURE EVALUATION
Patient: Marjorie Wolf    Procedure: Procedure(s):  CHOLECYSTECTOMY, LAPAROSCOPIC       Anesthesia Type:  General    Note:  Disposition: Outpatient   Postop Pain Control: Uneventful            Sign Out: Well controlled pain   PONV:    Neuro/Psych: Uneventful            Sign Out: Acceptable/Baseline neuro status   Airway/Respiratory: Uneventful            Sign Out: Acceptable/Baseline resp. status   CV/Hemodynamics: Uneventful            Sign Out: Acceptable CV status   Other NRE: NONE   DID A NON-ROUTINE EVENT OCCUR? No           Last vitals:  Vitals Value Taken Time   /74 11/01/24 1413   Temp 36.8  C (98.2  F) 11/01/24 1413   Pulse 91 11/01/24 1415   Resp 15 11/01/24 1415   SpO2 100 % 11/01/24 1415   Vitals shown include unfiled device data.    Electronically Signed By: Ariel Mejía MD  November 1, 2024  2:17 PM

## 2024-11-05 PROCEDURE — 88304 TISSUE EXAM BY PATHOLOGIST: CPT | Mod: 26

## 2024-11-06 NOTE — PROGRESS NOTES
[facility]  Surgery Consultation    PCP:  Jeanette Rodrigues [phone]    HPI: Patient is a 49-year-old female referred by the above-mentioned provider for consultation regarding gallbladder disease.  She has been having intermittent episodes of right flank and right-sided abdominal pain.  She had a visit to the emergency department related to this back in September.  She said no fevers or chills.  Mild nausea associated with this.  Seems to be associated with meals.  She has been evaluated and found to have cholelithiasis.  No tea colored urine or pale stools.    PMH:   has a past medical history of Allergic rhinitis due to other allergen, Arthritis, BCC (basal cell carcinoma), face (9/28/2022), Insomnia, Mild intermittent asthma, Personal history of other diseases of circulatory system (1995), SVT (supraventricular tachycardia) (H), and Thyroid disease (May 2014).  PSH:    has a past surgical history that includes APPENDECTOMY (4/1989); NONSPECIFIC PROCEDURE (6/1990); NONSPECIFIC PROCEDURE (11/1990); surgical history of -  (2000); ENT surgery (1990); Colonoscopy (N/A, 1/5/2023); and Laparoscopic cholecystectomy (N/A, 11/1/2024).  Social History:   reports that Marjorie has never smoked. Marjorie has never used smokeless tobacco. Marjorie reports current alcohol use. Marjorie reports that Marjorie does not use drugs.  Family History:  family history includes Arthritis in Marjorie's maternal grandmother, mother, and sister; C.A.D. in Marjorie's father and maternal grandfather; Depression in Marjorie's mother and sister; Diabetes in Marjorie's maternal grandfather and mother; Eye Disorder in Marjorie's maternal grandfather; Gastrointestinal Disease in Marjorie's father; Genetic Disorder in Marjorie's father; Hyperlipidemia in Marjorie's father and mother; Hypertension in Marjorie's father and mother; Lipids in Marjorie's father and sister; Neurologic Disorder in Marjorie's sister; Obesity in  "Marjorie's mother; Other Cancer in Marjorie's mother; Skin Cancer in Marjorie's mother; Thyroid Disease in Marjorie's mother.  Medications/Allergies: Home medications and allergies reviewed.    ROS:  The 10 point Review of Systems is negative other than noted in the HPI.    Physical Exam:  /70   Pulse 83   Ht 1.803 m (5' 11\")   Wt 85.3 kg (188 lb)   LMP 07/05/2020 (Exact Date)   SpO2 98%   BMI 26.22 kg/m    GENERAL: Generally appears well.  Psych: Alert and Oriented.  Normal affect  Eyes: Sclera clear  Respiratory:  Lungs clear to ausculation bilaterally with good air excursion  Cardiovascular:  Regular Rate and Rhythm with no murmurs gallops or rubs, normal peripheral pulses  GI: Abdomen Non Distended Soft Non-Tender  No hernias palpated.  Lymphatic/Hematologic/Immune:  No femoral or cervical lymphadenopathy.  Integumentary:  No rashes  Neurological: grossly intact    Ultrasound shows Cholelithiasis No gall bladder wall thicking  No pericholecystic fluid Ducts Measures 3MM  All new lab and imaging data was reviewed.     Impression and Plan:  Patient is a 49 year old adult with cholelithiasis.    PLAN:   I discussed the pathophysiology of gallbladder disease and complications of cholecystitis and choledocholithiasis with the patient.  Recommend outpatient laparoscopic cholecystectomy at her convenience I also discussed the risks associated with the procedure including, but not limited to infection, bleeding, conversion to open, bile leak, bile duct injury, retained gallstones, pneumonia, MI, and anesthesia complications with the patient.  I also discussed if a complication did occur it may require further surgical intervention during or after the procedure. The patient indicated understanding of the discussion, asked appropriate questions, and provided consent. I provided the patient an information pamphlet. I have recommended a low fat diet and instructed the patient to go to ER if they developed " persistent pain, persistent nausea and vomiting, or yellowness of skin.      Thank you very much for this consult.    Sonu Hammond M.D.  [facility]  [phone]    Please route or send letter to:  Primary Care Provider (PCP) and Referring Provider

## 2024-11-07 ENCOUNTER — TELEPHONE (OUTPATIENT)
Dept: SURGERY | Facility: CLINIC | Age: 49
End: 2024-11-07
Payer: COMMERCIAL

## 2024-11-07 NOTE — TELEPHONE ENCOUNTER
"Procedure: Laparoscopic cholecystectomy    Date: 11/1/24    Surgeon: Manish    Patient reports that she started experiencing a new pain, yesterday. Pain is sharp, shooting pain, starting in RUQ, deeper pain. Pain gets worse when she inhales    Pain is behind rib cage, but radiates down her abdomen and into her back. Not near any incision.     Pain also present with certain movements. But is \"emphasized\" when taking a deep breath.    She is eating and drinking. Maintaining low fat diet and eating smaller portions.    Urinating okay and having bowel movements    Out of the narcotic pain medication.  Using ibuprofen. Will add in tylenol now that she is out of the narcotic    Informed her that she could be experiencing nerve healing/inflammation.  However, I will discuss with PA team for further input due to increasing symptoms when she breathes.    She denies shortness of breath    She is in agreement with this plan and will wait for a call back with further input    Cecilia Obrien RN-BSN    "

## 2024-11-07 NOTE — TELEPHONE ENCOUNTER
Name of caller: Patient    Reason for Call:  symptoms  Developed a new pain on right side especially when she breathes in.     Surgeon:  Sonu Hammond MD    Recent Surgery:  Yes.    If yes, when & what type:  11/1/2024      CHOLECYSTECTOMY, LAPAROSCOPIC     Best phone number to reach pt at is: [phone]    Ok to leave a message with medical info? Yes.

## 2024-11-07 NOTE — TELEPHONE ENCOUNTER
Called patient back with Pa input:    - 1000 mg tylenol every 6 hours alternating with 600 mg ibuprofen every 6 hours    - alternate ice and heat. 20 minutes on, at least 20 minutes off    - walking okay. Gentle stretching okay    Call if symptoms persist and especially if they get worse    She is in agreement with this plan    Cecilia Obrien, RN-BSN

## 2024-11-08 ENCOUNTER — MYC REFILL (OUTPATIENT)
Dept: FAMILY MEDICINE | Facility: CLINIC | Age: 49
End: 2024-11-08
Payer: COMMERCIAL

## 2024-11-08 DIAGNOSIS — N95.1 MENOPAUSAL SYNDROME (HOT FLASHES): ICD-10-CM

## 2024-11-08 RX ORDER — VENLAFAXINE HYDROCHLORIDE 75 MG/1
75 CAPSULE, EXTENDED RELEASE ORAL DAILY
Qty: 90 CAPSULE | Refills: 3 | OUTPATIENT
Start: 2024-11-08

## 2024-11-12 ENCOUNTER — MYC MEDICAL ADVICE (OUTPATIENT)
Dept: FAMILY MEDICINE | Facility: CLINIC | Age: 49
End: 2024-11-12
Payer: COMMERCIAL

## 2024-11-14 ENCOUNTER — LAB (OUTPATIENT)
Dept: LAB | Facility: CLINIC | Age: 49
End: 2024-11-14
Payer: COMMERCIAL

## 2024-11-14 DIAGNOSIS — E06.3 HASHIMOTO'S THYROIDITIS: ICD-10-CM

## 2024-11-14 LAB — TSH SERPL DL<=0.005 MIU/L-ACNC: 2.62 UIU/ML (ref 0.3–4.2)

## 2024-11-14 PROCEDURE — 84443 ASSAY THYROID STIM HORMONE: CPT

## 2024-11-14 PROCEDURE — 36415 COLL VENOUS BLD VENIPUNCTURE: CPT

## 2024-11-19 ENCOUNTER — MYC REFILL (OUTPATIENT)
Dept: FAMILY MEDICINE | Facility: CLINIC | Age: 49
End: 2024-11-19
Payer: COMMERCIAL

## 2024-11-19 DIAGNOSIS — E06.3 HASHIMOTO'S THYROIDITIS: ICD-10-CM

## 2024-11-20 RX ORDER — LEVOTHYROXINE SODIUM 25 UG/1
25 TABLET ORAL DAILY
Qty: 90 TABLET | Refills: 1 | Status: SHIPPED | OUTPATIENT
Start: 2024-11-20

## 2025-01-13 ENCOUNTER — E-VISIT (OUTPATIENT)
Dept: URGENT CARE | Facility: CLINIC | Age: 50
End: 2025-01-13
Payer: COMMERCIAL

## 2025-01-13 DIAGNOSIS — J01.90 ACUTE BACTERIAL SINUSITIS: Primary | ICD-10-CM

## 2025-01-13 DIAGNOSIS — B96.89 ACUTE BACTERIAL SINUSITIS: Primary | ICD-10-CM

## 2025-01-13 RX ORDER — DOXYCYCLINE HYCLATE 100 MG
100 TABLET ORAL 2 TIMES DAILY
Qty: 14 TABLET | Refills: 0 | Status: SHIPPED | OUTPATIENT
Start: 2025-01-13 | End: 2025-01-20

## 2025-01-13 NOTE — PATIENT INSTRUCTIONS
Thank you for choosing us for your care. I have placed an order for a prescription so that you can start treatment. View your full visit summary for details by clicking on the link below. Your pharmacist will able to address any questions you may have about the medication.     If you're not feeling better within 5-7 days, please schedule an appointment.  You can schedule an appointment right here in [facility], or call [phone]  If the visit is for the same symptoms as your eVisit, we'll refund the cost of your eVisit if seen within seven days.

## 2025-01-21 ENCOUNTER — MYC MEDICAL ADVICE (OUTPATIENT)
Dept: FAMILY MEDICINE | Facility: CLINIC | Age: 50
End: 2025-01-21
Payer: COMMERCIAL

## 2025-01-21 RX ORDER — SCOPOLAMINE 1 MG/3D
1 PATCH, EXTENDED RELEASE TRANSDERMAL
Qty: 2 PATCH | Refills: 0 | Status: CANCELLED | OUTPATIENT
Start: 2025-01-21

## 2025-01-21 NOTE — TELEPHONE ENCOUNTER
Wegener (DOD with PN out),    Please see below [employer] message and advise.   Okay for scopolamine patch Rx without OV? Pended if approved    Thanks,   Thea TOTH RN

## 2025-01-21 NOTE — TELEPHONE ENCOUNTER
"Please have him just submit an e-visit using \"long-term condition follow-up or medication refill request.\"  Then can go over drug interactions as well.     I'll look for it and can complete it.     Joel Wegener,MD w  "

## 2025-02-10 ENCOUNTER — MYC MEDICAL ADVICE (OUTPATIENT)
Dept: FAMILY MEDICINE | Facility: CLINIC | Age: 50
End: 2025-02-10
Payer: COMMERCIAL

## 2025-02-26 ENCOUNTER — E-VISIT (OUTPATIENT)
Dept: FAMILY MEDICINE | Facility: CLINIC | Age: 50
End: 2025-02-26
Payer: COMMERCIAL

## 2025-02-26 DIAGNOSIS — T75.3XXA MOTION SICKNESS, INITIAL ENCOUNTER: Primary | ICD-10-CM

## 2025-02-26 PROCEDURE — 99421 OL DIG E/M SVC 5-10 MIN: CPT | Performed by: FAMILY MEDICINE

## 2025-02-28 NOTE — TELEPHONE ENCOUNTER
Provider E-Visit time total (minutes): {AMB PROVIDER EVISIT TOTAL MINUTES LIST:[id number]}    {Please send feedback regarding eVisits to [email or web address]}

## 2025-03-04 RX ORDER — SCOPOLAMINE 1 MG/3D
1 PATCH, EXTENDED RELEASE TRANSDERMAL
Qty: 4 PATCH | Refills: 1 | Status: SHIPPED | OUTPATIENT
Start: 2025-03-04

## 2025-05-14 DIAGNOSIS — E06.3 HASHIMOTO'S THYROIDITIS: ICD-10-CM

## 2025-05-14 RX ORDER — LEVOTHYROXINE SODIUM 25 UG/1
25 TABLET ORAL DAILY
Qty: 90 TABLET | Refills: 1 | Status: SHIPPED | OUTPATIENT
Start: 2025-05-14

## 2025-05-17 DIAGNOSIS — N95.1 MENOPAUSAL SYNDROME (HOT FLASHES): ICD-10-CM

## 2025-05-20 RX ORDER — VENLAFAXINE HYDROCHLORIDE 75 MG/1
75 CAPSULE, EXTENDED RELEASE ORAL DAILY
Qty: 90 CAPSULE | Refills: 1 | Status: SHIPPED | OUTPATIENT
Start: 2025-05-20

## 2025-05-22 ENCOUNTER — OFFICE VISIT (OUTPATIENT)
Dept: OBGYN | Facility: CLINIC | Age: 50
End: 2025-05-22
Payer: COMMERCIAL

## 2025-05-22 VITALS
DIASTOLIC BLOOD PRESSURE: 75 MMHG | HEART RATE: 80 BPM | TEMPERATURE: 97.1 F | WEIGHT: 191 LBS | SYSTOLIC BLOOD PRESSURE: 129 MMHG | BODY MASS INDEX: 26.64 KG/M2

## 2025-05-22 DIAGNOSIS — M79.18 MYALGIA OF PELVIC FLOOR: Primary | ICD-10-CM

## 2025-05-22 DIAGNOSIS — N95.1 MENOPAUSAL SYNDROME (HOT FLASHES): ICD-10-CM

## 2025-05-22 RX ORDER — PROGESTERONE 100 MG/1
100 CAPSULE ORAL DAILY
Qty: 90 CAPSULE | Refills: 3 | Status: SHIPPED | OUTPATIENT
Start: 2025-05-22

## 2025-05-22 RX ORDER — ESTRADIOL 0.05 MG/D
1 PATCH, EXTENDED RELEASE TRANSDERMAL
Qty: 24 PATCH | Refills: 3 | Status: SHIPPED | OUTPATIENT
Start: 2025-05-22

## 2025-05-22 NOTE — PROGRESS NOTES
"  Assessment & Plan     Myalgia of pelvic floor  Reviewed exam findings.   Discussed pelvic floor myalgia. Discussed etiology, natural history, treatment.   Recommended and ordered pelvic floor PT.   Return to clinic in 3 months    - Physical Therapy  Referral; Future    Menopausal syndrome (hot flashes)  Discussed options for management - hormonal and nonhormonal  Discussed FDA approved indications for MHT  Discussed formulations, risks, benefits.   Reviewed breast cancer and CVD risk  Discussed reduction in breast cancer risk elevation with non-systemic progestin. Discussed risk of thrombo-embolism, vaginal bleeding, other side effects.   RTC 3 months.     - estradiol (VIVELLE-DOT) 0.05 MG/24HR bi-weekly patch; Place 1 patch over 96 hours onto the skin twice a week.  - progesterone (PROMETRIUM) 100 MG capsule; Take 1 capsule (100 mg) by mouth daily.          BMI  Estimated body mass index is 26.64 kg/m  as calculated from the following:    Height as of 11/1/24: 1.803 m (5' 11\").    Weight as of this encounter: 86.6 kg (191 lb).             Fredrick Amador is a 50 year old, presenting for the following health issues:  Menopausal Sx    HPI    Presents for menopause discussion  Patient's last menstrual period was 07/05/2020 (exact date).     Has been using vagifem for 3-4 years. Requested it from PCP because she heard it was less risky  Has been having hot flashes all day every day. Present x 5 days. She's been telling all of her friends how terrible menopause is.  Has been having some weight gain, but a lot of factors there.   Has been having pain with sex, not improved by vagifem.     The 10-year ASCVD risk score (Kasey DK, et al., 2019) is: 1.6%    Values used to calculate the score:      Age: 50 years      Sex: Female      Is Non- : No      Diabetic: No      Tobacco smoker: No      Systolic Blood Pressure: 129 mmHg      Is BP treated: No      HDL Cholesterol: 51 mg/dL      " Total Cholesterol: 220 mg/dL    Past Medical History:   Diagnosis Date    Allergic rhinitis due to other allergen     Arthritis     BCC (basal cell carcinoma), face 9/28/2022    Insomnia     benadryl nightly (50mg), se's on trazodone    Mild intermittent asthma     uses maxair 2-3x/yr    Personal history of other diseases of circulatory system 1995    Virally induced stroke- high fever, resolved completely    SVT (supraventricular tachycardia)     Thyroid disease May 2014    Hypothyroidism       Past Surgical History:   Procedure Laterality Date    COLONOSCOPY N/A 1/5/2023    Procedure: COLONOSCOPY, WITH POLYPECTOMY;  Surgeon: Vesta Conner MD;  [place] [facility] [place] SURGERY  1990    2 surgeries for deviated septum    LAPAROSCOPIC CHOLECYSTECTOMY N/A 11/1/2024    Procedure: CHOLECYSTECTOMY, LAPAROSCOPIC;  Surgeon: Sonu Hammond MD;  Location:  OR    SURGICAL HISTORY OF -   2000    wisdom teeth extraction    ZZC APPENDECTOMY  4/1989    ZZC NONSPECIFIC PROCEDURE  6/1990    Nasal surgery for deviated septum    ZZC NONSPECIFIC PROCEDURE  11/1990    Nasal Surgery for deviated septum       Family History   Problem Relation Age of Onset    Diabetes Mother         Type 2    Arthritis Mother     Depression Mother         anxiety/panic attacks as well    Obesity Mother     Thyroid Disease Mother         dx ~35yrs    Hypertension Mother     Hyperlipidemia Mother     Skin Cancer Mother     Other Cancer Mother         skin cancer - basal and squamous    C.A.D. Father         MI at age 54    Gastrointestinal Disease Father         Ulcer H-Pylori?    Lipids Father     Hypertension Father     Hyperlipidemia Father     Genetic Disorder Father     Arthritis Maternal Grandmother     C.A.D. Maternal Grandfather         Artherosclerosis- cause of death, unsure if previous MI    Diabetes Maternal Grandfather         Type 2    Eye Disorder Maternal Grandfather         Glaucoma    Arthritis Sister     Depression Sister      Neurologic Disorder Sister         Migraines    Lipids Sister     Cerebrovascular Disease No family hx of     Breast Cancer No family hx of         Mother had biopsy 2016, found non-malignant    Cancer - colorectal No family hx of        Social History     Socioeconomic History    Marital status:      Spouse name: Maddie Renee    Number of children: 0    Years of education: Ph.D    Highest education level: Not on file   Occupational History    Occupation:      Employer: [employer]     Comment: Sociology   Tobacco Use    Smoking status: Never    Smokeless tobacco: Never   Vaping Use    Vaping status: Never Used   Substance and Sexual Activity    Alcohol use: Yes     Comment: Occas.    Drug use: No    Sexual activity: Yes     Partners: Female     Birth control/protection: Post-menopausal, None     Comment: stable relationship   Other Topics Concern    Parent/sibling w/ CABG, MI or angioplasty before 65F 55M? Yes   Social History Narrative    Social Documentation:3/19/10        Balanced Diet: YES    Calcium intake: more than 2 per day    Caffeine: 1-2 cups per day    Exercise:  type of activity gym;  4 times per week    Sunscreen: Yes    Seatbelts:  Yes    Self Breast Exam:  No    Self Testicular Exam: n/a    Physical/Emotional/Sexual Abuse: No    Do you feel safe in your environment? Yes        Cholesterol screen up to date: Lab Test   3/4/09    2/2/07                  0934      0911         CHOL       194       179          HDL        36*       51           LDL        107       103          TRIG       256*      124          CHOLHDLRA* 5.5*      3.5              Eye Exam up to date: No    Dental Exam up to date: Yes    Pap smear up to date: PAP      NIL   3/4/2009    Mammogram up to date: Does Not Apply    Dexa Scan up to date: Does Not Apply    Colonoscopy up to date: Does Not Apply    Immunizations up to date: Yes last td 2005    Glucose screen if over 40:  Yes        Kacy Mccabe CMA                           Social Drivers of Health     Financial Resource Strain: Low Risk  (10/1/2024)    Financial Resource Strain     Within the past 12 months, have you or your family members you live with been unable to get utilities (heat, electricity) when it was really needed?: No   Food Insecurity: Low Risk  (10/1/2024)    Food Insecurity     Within the past 12 months, did you worry that your food would run out before you got money to buy more?: No     Within the past 12 months, did the food you bought just not last and you didn t have money to get more?: No   Transportation Needs: Low Risk  (10/1/2024)    Transportation Needs     Within the past 12 months, has lack of transportation kept you from medical appointments, getting your medicines, non-medical meetings or appointments, work, or from getting things that you need?: No   Physical Activity: Insufficiently Active (10/1/2024)    Exercise Vital Sign     Days of Exercise per Week: 3 days     Minutes of Exercise per Session: 30 min   Stress: No Stress Concern Present (10/1/2024)    [employer] - Occupational Stress Questionnaire     Feeling of Stress : Not at all   Social Connections: Unknown (10/1/2024)    Social Connection and Isolation Panel [NHANES]     Frequency of Communication with Friends and Family: Not on file     Frequency of Social Gatherings with Friends and Family: Twice a week     Attends [employer] Services: Not on file     Active Member of Clubs or Organizations: Not on file     Attends Club or Organization Meetings: Not on file     Marital Status: Not on file   Interpersonal Safety: Low Risk  (11/1/2024)    Interpersonal Safety     Do you feel physically and emotionally safe where you currently live?: Yes     Within the past 12 months, have you been hit, slapped, kicked or otherwise physically hurt by someone?: No     Within the past 12 months, have you been humiliated or emotionally abused in other ways by your  partner or ex-partner?: No   Housing Stability: Low Risk  (10/1/2024)    Housing Stability     Do you have housing? : Yes     Are you worried about losing your housing?: No       Current Outpatient Medications   Medication Sig Dispense Refill    amitriptyline (ELAVIL) 25 MG tablet TAKE 3 TABLETS BY MOUTH NIGHTLY AS NEEDED FOR SLEEP 270 tablet 3    estradiol (VAGIFEM) 10 MCG TABS vaginal tablet Place 1 tablet (10 mcg) vaginally twice a week. 24 tablet 3    Lactase (LACTAID PO)       levalbuterol (XOPENEX HFA) 45 MCG/ACT inhaler Inhale 2 puffs into the lungs every 4 hours as needed for shortness of breath / dyspnea or wheezing 15 g 2    levothyroxine (SYNTHROID/LEVOTHROID) 25 MCG tablet TAKE 1 TABLET BY MOUTH EVERY DAY 90 tablet 1    polyethylene glycol (MIRALAX) 17 GM/Dose powder Take 17 g (1 Capful) by mouth daily      scopolamine (TRANSDERM) 1 MG/3DAYS 72 hr patch Place 1 patch over 72 hours onto the skin every 72 hours. Place 4-12 hours prior to travel 4 patch 1    UNKNOWN MED DOSAGE digestive advantage for lactose intolorance - 1 tab daily      venlafaxine (EFFEXOR XR) 75 MG 24 hr capsule TAKE 1 CAPSULE BY MOUTH EVERY DAY 90 capsule 1    VITAMIN D, CHOLECALCIFEROL, PO Take by mouth daily        No current facility-administered medications for this visit.          Allergies   Allergen Reactions    Erythromycin Rash    Penicillins Rash    Sulfa Antibiotics Rash           Review of Systems  Constitutional, HEENT, cardiovascular, pulmonary, gi and gu systems are negative, except as otherwise noted.      Objective    /75   Pulse 80   Temp 97.1  F (36.2  C)   Wt 86.6 kg (191 lb)   LMP 07/05/2020 (Exact Date)   BMI 26.64 kg/m    Body mass index is 26.64 kg/m .  Physical Exam   GENERAL: alert and no distress  ABDOMEN: soft, nontender, no hepatosplenomegaly, no masses and bowel sounds normal   (female): normal female external genitalia, normal urethral meatus, normal vaginal mucosa  MS: no gross  musculoskeletal defects noted, no edema. Levator ani bilaterally tense and tender. Obturator internus bilaterally tense and tender.   SKIN: no suspicious lesions or rashes  PSYCH: mentation appears normal, affect normal/bright    BILATERAL FULL FIELD DIGITAL SCREENING MAMMOGRAM WITH TOMOSYNTHESIS     Performed on: 10/28/24     Compared to: 10/27/2023, 09/09/2022, 08/27/2021, and 08/13/2020     Technique:  This study was evaluated with the assistance of Computer-Aided Detection.  Breast Tomosynthesis was used in interpretation.     Findings: There are scattered areas of fibroglandular density.  There is no radiographic evidence of malignancy.                                                                    IMPRESSION: ACR BI-RADS Category 1: Negative     BREAST CANCER SCREENING RECOMMENDATION: Routine yearly mammography beginning at age 40 or as discussed with your provider.     The results and recommendations of this examination will be communicated to the patient.     I have personally reviewed the examination and initial interpretation  and I agree with the findings.        Kwame Berry MD; Elizabeth Saldivar MD        Component      Latest Ref Rng 9/30/2024  12:13 AM 11/14/2024  7:35 AM   WBC      4.0 - 11.0 10e3/uL 6.9     RBC Count      3.80 - 5.90 10e6/uL 4.33     Hemoglobin      11.7 - 17.7 g/dL 12.9     Hematocrit      35.0 - 53.0 % 40.3     MCV      78 - 100 fL 93     MCH      26.5 - 33.0 pg 29.8     MCHC      31.5 - 36.5 g/dL 32.0     RDW      10.0 - 15.0 % 13.3     Platelet Count      150 - 450 10e3/uL 189     % Neutrophils      % 65     % Lymphocytes      % 25     % Monocytes      % 6     % Eosinophils      % 3     % Basophils      % 1     % Immature Granulocytes      % 1     NRBC/W      <1 /100 0     Absolute Neutrophil      1.6 - 8.3 10e3/uL 4.5     Absolute Lymphocytes      0.8 - 5.3 10e3/uL 1.7     Absolute Monocytes      0.0 - 1.3 10e3/uL 0.4     Absolute Eosinophils      0.0 - 0.7 10e3/uL 0.2      Absolute Basophils      0.0 - 0.2 10e3/uL 0.1     Absolute Immature Granulocytes      <=0.4 10e3/uL 0.1     Absolute NRBCs      10e3/uL 0.0     Sodium      135 - 145 mmol/L 140     Potassium      3.4 - 5.3 mmol/L 4.1     Chloride      98 - 107 mmol/L 105     Carbon Dioxide (CO2)      22 - 29 mmol/L 22     Anion Gap      7 - 15 mmol/L 13     Urea Nitrogen      6.0 - 20.0 mg/dL 10.9     Creatinine      0.51 - 1.17 mg/dL 0.73     GFR Estimate      >60 mL/min/1.73m2 >90     Calcium      8.8 - 10.4 mg/dL 9.0     Glucose      70 - 99 mg/dL 115 (H)     Protein Total      6.4 - 8.3 g/dL 6.7     Albumin      3.5 - 5.2 g/dL 4.0     Bilirubin Total      <=1.2 mg/dL 0.2     Alkaline Phosphatase      40 - 150 U/L 104     AST      0 - 45 U/L 47 (H)     ALT      0 - 70 U/L 47     Bilirubin Direct      0.00 - 0.30 mg/dL <0.20     Cholesterol      <200 mg/dL 220 (H)     Triglycerides      <150 mg/dL 205 (H)     HDL Cholesterol      >=40 mg/dL 51     LDL Cholesterol Calculated      <100 mg/dL 128 (H)     Non HDL Cholesterol      <130 mg/dL 169 (H)     Free T3      2.0 - 4.4 pg/mL 2.9     T4 Free      0.90 - 1.70 ng/dL 0.82 (L)     TSH      0.30 - 4.20 uIU/mL 12.70 (H)  2.62    Thyroid Peroxidase Antibody      <35 IU/mL 37 (H)        Legend:  (H) High  (L) Low    Signed Electronically by: Moriah Garcia MD

## 2025-07-17 ENCOUNTER — THERAPY VISIT (OUTPATIENT)
Age: 50
End: 2025-07-17
Attending: OBSTETRICS & GYNECOLOGY
Payer: COMMERCIAL

## 2025-07-17 DIAGNOSIS — M62.89 PELVIC FLOOR DYSFUNCTION: Primary | ICD-10-CM

## 2025-07-17 DIAGNOSIS — M79.18 MYALGIA OF PELVIC FLOOR: ICD-10-CM

## 2025-07-17 NOTE — PROGRESS NOTES
PHYSICAL THERAPY EVALUATION  Type of Visit: Evaluation  Marjorie presents today, with pain with vaginal penetration for the past 3-5 years.     Rarely she will have leaking with a cough or sneeze.     She notes chronic low left sided low back pain.     Fall Risk Screen:  Have you fallen 2 or more times in the past year?: No  Have you fallen and had an injury in the past year?: No    Subjective         Presenting condition or subjective complaint: Pain during sex  Date of onset: 05/22/25    Relevant medical history: Asthma; Cancer; Menopause; Thyroid problems   Dates & types of surgery: Appendix 1989, deviated septum - two surgeries in 1990, gall bladder in 2024    Prior diagnostic imaging/testing results:       Prior therapy history for the same diagnosis, illness or injury: No      Living Environment  Social support: With a significant other or spouse   Type of home: House   Stairs to enter the home: Yes 4 Is there a railing: Yes     Ramp: No   Stairs inside the home: Yes 12 Is there a railing: Yes     Help at home: None  Equipment owned:       Employment: Yes   Hobbies/Interests: Biking, walking, reading, puzzles    Patient goals for therapy: Not have pain during sex       Objective      PELVIC EVALUATION  ADDITIONAL HISTORY:  Sex assigned at birth: Female  Gender identity: Female    Pronouns: She/Her Hers      Bladder History:  Feels bladder filling: Yes  Triggers for feeling of inability to wait to go to the bathroom: No    How long can you wait to urinate: Depends  Gets up at night to urinate: Yes Once in a while, once at night  Can stop the flow of urine when urinating: Yes  Volume of urine usually released: Medium   Other issues:    Number of bladder infections in last 12 months:    Fluid intake per day: 64 oz? 16 oz coffee or 40 oz tea None  Medications taken for bladder: No     Activities causing urine leak: Cough; Sneeze    Amount of urine typically leaked: Drip or 2  Pads used to help  with leaking: No      Frequency of voids during the day: every 2-4 hours during the day    Bowel History:  Frequency of bowel movement: Daily or more  Consistency of stool: Soft-formed   bristol stool scale: 4-5  Ignores the urge to defecate: No  Other bowel issues:    Length of time spent trying to have a bowel movement:    Sometimes she does not feel as though she empties fully.   She will take Miralax daily     Sexual Function History:  Sexual orientation: Lesbian    Sexually active: Yes  Lubrication used: Yes Yes  Pelvic pain: Initial penetration (rectal or vaginal); Deep penetration (rectal or vaginal)    Pain or difficulty with orgasms/erection/ejaculation: No    State of menopause: Post-menopause (I am done with menopause)  Hormone medications: Yes Estradiol (0.5) and progesterone (100 mg)    Are you currently pregnant: No  Number of previous pregnancies: 0  Number of deliveries: 0  Have you been diagnosed with pelvic prolapse or abdominal separation: No  Do you get regular exercise: Yes  I do this type of exercise: Biking, walking, running, lift weights at gym  Have you tried pelvic floor strengthening exercises for 4 weeks: No  Do you have any history of trauma that is relevant to your care that you d like to share: No      Discussed reason for referral regarding pelvic health needs and external/internal pelvic floor muscle examination with patient/guardian.  Opportunity provided to ask questions and verbal consent for assessment and intervention was given.    POSTURE: WNL  HIP SCREEN:  Strength: hip flexion: 5/5 bilaterally, hip ER: 5/5 bilaterally, hip IR: 5/5 bilaterally, glute max: 5-/5 bilaterally, glute med: R: 4+/5, L: 5-/5   Functional Strength Testing: Double Leg Squat: Anterior knee translation, Knee valgus, Hip internal rotation, Improper use of glutes/hips, and trunk rotation to right side at bottom of squat    [place]: L: +, R: -    PELVIC EXAM  External Visual Inspection:  At rest:  Normal  With voluntary pelvic floor contraction: Present  Relaxation of PFM: Partial/delayed relaxation    Integumentary:   Introitus: Unremarkable    External Digital Palpation per Perineum:   Ischiocavernosis: Unremarkable  Bulbo cavernosis: Unremarkable  Transverse perineal: Unremarkable  Levator ani: Unremarkable    Internal Digital Palpation:  Per Vagina:  Tenderness  Myofascial Resistance to Palpation: Soft, Taut, tension bulbocavernosus bilaterally and levator ani L>R  Digital Muscle Performance: P (Power): 2/5  E (Endurance): 10 seconds  F (Fast Twitch): 10/10  Compensations: Abdominals, Breath holding  Relaxation Post-Contraction: Normal, Partial/delayed relaxation    ABDOMINAL ASSESSMENT  Diastasis Rectus Abdominis (DARIN):  DARIN presence: No    Fascial Tension/Restriction: Hypomobile    Assessment & Plan   CLINICAL IMPRESSIONS  Medical Diagnosis: myalgia of pelvic floor    Treatment Diagnosis: pelvic floor dysfunction   Impression/Assessment: Patient is a 50 year old adult with pelvic pain complaints.  The following significant findings have been identified: Pain, Decreased ROM/flexibility, Decreased strength, Impaired muscle performance, and Decreased activity tolerance. These impairments interfere with their ability to perform self care tasks and recreational activities as compared to previous level of function.     Clinical Decision Making (Complexity):  Clinical Presentation: Stable/Uncomplicated  Clinical Presentation Rationale: based on medical and personal factors listed in PT evaluation  Clinical Decision Making (Complexity): Low complexity    PLAN OF CARE  Treatment Interventions:  Modalities: Biofeedback, Cryotherapy, Cupping, Dry Needling, E-stim, Hot Pack  Interventions: Gait Training, Manual Therapy, Neuromuscular Re-education, Therapeutic Activity, Therapeutic Exercise, Self-Care/Home Management    Long Term Goals     PT Goal 1  Goal Description: Pt will have 0/10p with vaginal penetration  during intimacy.  Rationale: to maximize safety and independence with performance of ADLs and functional tasks  Target Date: 10/09/25      Frequency of Treatment: 1x a week  Duration of Treatment: 12 weeks    Education Assessment:   Learner/Method: Patient    Risks and benefits of evaluation/treatment have been explained.   Patient/Family/caregiver agrees with Plan of Care.     Evaluation Time:     PT Eval, Low Complexity Minutes (46064): 24     Signing Clinician: Bettina Mclaughlin PT

## 2025-07-20 ENCOUNTER — MYC MEDICAL ADVICE (OUTPATIENT)
Dept: FAMILY MEDICINE | Facility: CLINIC | Age: 50
End: 2025-07-20
Payer: COMMERCIAL

## 2025-07-20 DIAGNOSIS — Z01.00 VISIT FOR EYE AND VISION EXAM: Primary | ICD-10-CM

## 2025-07-23 ENCOUNTER — PATIENT OUTREACH (OUTPATIENT)
Dept: CARE COORDINATION | Facility: CLINIC | Age: 50
End: 2025-07-23
Payer: COMMERCIAL

## 2025-07-24 ENCOUNTER — THERAPY VISIT (OUTPATIENT)
Age: 50
End: 2025-07-24
Attending: OBSTETRICS & GYNECOLOGY
Payer: COMMERCIAL

## 2025-07-24 DIAGNOSIS — M62.89 PELVIC FLOOR DYSFUNCTION: Primary | ICD-10-CM

## 2025-07-31 ENCOUNTER — THERAPY VISIT (OUTPATIENT)
Age: 50
End: 2025-07-31
Attending: OBSTETRICS & GYNECOLOGY
Payer: COMMERCIAL

## 2025-07-31 DIAGNOSIS — M62.89 PELVIC FLOOR DYSFUNCTION: Primary | ICD-10-CM

## 2025-08-07 ENCOUNTER — OFFICE VISIT (OUTPATIENT)
Dept: OBGYN | Facility: CLINIC | Age: 50
End: 2025-08-07
Payer: COMMERCIAL

## 2025-08-07 VITALS
BODY MASS INDEX: 26.08 KG/M2 | WEIGHT: 187 LBS | SYSTOLIC BLOOD PRESSURE: 120 MMHG | HEART RATE: 92 BPM | DIASTOLIC BLOOD PRESSURE: 79 MMHG | TEMPERATURE: 97.5 F

## 2025-08-07 DIAGNOSIS — M79.18 MYALGIA OF PELVIC FLOOR: ICD-10-CM

## 2025-08-07 DIAGNOSIS — N95.1 MENOPAUSAL SYNDROME (HOT FLASHES): Primary | ICD-10-CM

## (undated) DEVICE — LINEN TOWEL PACK X5 5464

## (undated) DEVICE — SOL WATER IRRIG 500ML BOTTLE 2F7113

## (undated) DEVICE — PACK LAP CHOLE SLC15LCFSD

## (undated) DEVICE — ENDO TROCAR SLEEVE KII Z-THREADED 05X100MM CTS02

## (undated) DEVICE — TUBING SUCTION 12"X1/4" N612

## (undated) DEVICE — ESU HOLDER LAP INST DISP PURPLE LONG 330MM H-PRO-330

## (undated) DEVICE — ENDO SCOPE WARMER LF TM500

## (undated) DEVICE — SU VICRYL 4-0 PS-2 18" UND J496H

## (undated) DEVICE — KIT ENDO TURNOVER/PROCEDURE CARRY-ON 101822

## (undated) DEVICE — DECANTER BAG 2002S

## (undated) DEVICE — GLOVE BIOGEL PI SZ 8.0 40880

## (undated) DEVICE — POUCH TISSUE RETRIEVAL LAP 10MM 2.21" INTRO TRS100SB2

## (undated) DEVICE — ESU GROUND PAD UNIVERSAL W/O CORD

## (undated) DEVICE — SUCTION MANIFOLD NEPTUNE 2 SYS 1 PORT 702-025-000

## (undated) DEVICE — ENDO TROCAR FIRST ENTRY KII FIOS Z-THRD 11X100MM CTF33

## (undated) DEVICE — SPECIMEN CONTAINER 3OZ W/FORMALIN 59901

## (undated) DEVICE — SUCTION IRR STRYKERFLOW II W/TIP 250-070-520

## (undated) DEVICE — SOL WATER IRRIG 1000ML BOTTLE 2F7114

## (undated) DEVICE — ENDO TROCAR FIRST ENTRY KII FIOS Z-THRD 05X100MM CTF03

## (undated) DEVICE — ANTIFOG SOLUTION W/FOAM PAD 31142527

## (undated) DEVICE — PREP CHLORAPREP 26ML TINTED HI-LITE ORANGE 930815

## (undated) DEVICE — ENDO TRAP POLYP E-TRAP 00711099

## (undated) DEVICE — SOL NACL 0.9% INJ 1000ML BAG 2B1324X

## (undated) DEVICE — GOWN IMPERVIOUS SPECIALTY XLG/XLONG 32474

## (undated) DEVICE — SU VICRYL+ 0 27 UR6 VLT VCP603H

## (undated) DEVICE — SNARE CAPIVATOR ROUND COLD SNR BX10 M00561101

## (undated) DEVICE — EVAC SYSTEM CLEAR FLOW SC082500

## (undated) DEVICE — DEVICE SUTURE PASSER 14GA WECK EFX EFXSP2

## (undated) DEVICE — ENDO SNARE EXACTO COLD 9MM LOOP 2.4MMX230CM 00711115

## (undated) DEVICE — CLIP APPLIER ENDO 5MM M/L LIGAMAX EL5ML

## (undated) DEVICE — GOWN IMPERVIOUS 2XL BLUE

## (undated) DEVICE — NDL INSUFFLATION 13GA 120MM C2201

## (undated) RX ORDER — DEXAMETHASONE SODIUM PHOSPHATE 4 MG/ML
INJECTION, SOLUTION INTRA-ARTICULAR; INTRALESIONAL; INTRAMUSCULAR; INTRAVENOUS; SOFT TISSUE
Status: DISPENSED
Start: 2024-11-01

## (undated) RX ORDER — PROPOFOL 10 MG/ML
INJECTION, EMULSION INTRAVENOUS
Status: DISPENSED
Start: 2024-11-01

## (undated) RX ORDER — HYDROMORPHONE HCL IN WATER/PF 6 MG/30 ML
PATIENT CONTROLLED ANALGESIA SYRINGE INTRAVENOUS
Status: DISPENSED
Start: 2024-11-01

## (undated) RX ORDER — BUPIVACAINE HYDROCHLORIDE AND EPINEPHRINE 2.5; 5 MG/ML; UG/ML
INJECTION, SOLUTION EPIDURAL; INFILTRATION; INTRACAUDAL; PERINEURAL
Status: DISPENSED
Start: 2024-11-01

## (undated) RX ORDER — ONDANSETRON 2 MG/ML
INJECTION INTRAMUSCULAR; INTRAVENOUS
Status: DISPENSED
Start: 2024-11-01

## (undated) RX ORDER — FENTANYL CITRATE 50 UG/ML
INJECTION, SOLUTION INTRAMUSCULAR; INTRAVENOUS
Status: DISPENSED
Start: 2024-11-01

## (undated) RX ORDER — CLINDAMYCIN PHOSPHATE 900 MG/50ML
INJECTION, SOLUTION INTRAVENOUS
Status: DISPENSED
Start: 2024-11-01

## (undated) RX ORDER — EPHEDRINE SULFATE 50 MG/ML
INJECTION, SOLUTION INTRAMUSCULAR; INTRAVENOUS; SUBCUTANEOUS
Status: DISPENSED
Start: 2024-11-01

## (undated) RX ORDER — HYDROCODONE BITARTRATE AND ACETAMINOPHEN 5; 325 MG/1; MG/1
TABLET ORAL
Status: DISPENSED
Start: 2024-11-01

## (undated) RX ORDER — SCOLOPAMINE TRANSDERMAL SYSTEM 1 MG/1
PATCH, EXTENDED RELEASE TRANSDERMAL
Status: DISPENSED
Start: 2024-11-01